# Patient Record
Sex: MALE | Race: WHITE | NOT HISPANIC OR LATINO | Employment: OTHER | URBAN - METROPOLITAN AREA
[De-identification: names, ages, dates, MRNs, and addresses within clinical notes are randomized per-mention and may not be internally consistent; named-entity substitution may affect disease eponyms.]

---

## 2017-01-12 DIAGNOSIS — Z87.442 HISTORY OF KIDNEY STONES: Primary | ICD-10-CM

## 2017-01-12 DIAGNOSIS — N52.9 ED (ERECTILE DYSFUNCTION): ICD-10-CM

## 2017-01-12 DIAGNOSIS — R31.29 MICROHEMATURIA: ICD-10-CM

## 2017-01-13 ENCOUNTER — HOSPITAL ENCOUNTER (OUTPATIENT)
Dept: GENERAL RADIOLOGY | Facility: CLINIC | Age: 67
Discharge: HOME | End: 2017-01-13
Attending: UROLOGY | Admitting: UROLOGY
Payer: MEDICARE

## 2017-01-13 ENCOUNTER — OFFICE VISIT (OUTPATIENT)
Dept: FAMILY MEDICINE | Facility: CLINIC | Age: 67
End: 2017-01-13
Payer: COMMERCIAL

## 2017-01-13 ENCOUNTER — OFFICE VISIT (OUTPATIENT)
Dept: UROLOGY | Facility: CLINIC | Age: 67
End: 2017-01-13
Payer: COMMERCIAL

## 2017-01-13 VITALS
OXYGEN SATURATION: 96 % | HEIGHT: 69 IN | WEIGHT: 270 LBS | HEART RATE: 57 BPM | SYSTOLIC BLOOD PRESSURE: 111 MMHG | DIASTOLIC BLOOD PRESSURE: 58 MMHG | BODY MASS INDEX: 39.99 KG/M2 | TEMPERATURE: 97.9 F

## 2017-01-13 VITALS
BODY MASS INDEX: 39.99 KG/M2 | HEIGHT: 69 IN | SYSTOLIC BLOOD PRESSURE: 120 MMHG | WEIGHT: 270 LBS | DIASTOLIC BLOOD PRESSURE: 62 MMHG

## 2017-01-13 DIAGNOSIS — N40.1 BENIGN PROSTATIC HYPERPLASIA WITH LOWER URINARY TRACT SYMPTOMS, UNSPECIFIED MORPHOLOGY: Primary | ICD-10-CM

## 2017-01-13 DIAGNOSIS — N52.01 ERECTILE DYSFUNCTION DUE TO ARTERIAL INSUFFICIENCY: ICD-10-CM

## 2017-01-13 DIAGNOSIS — I48.19 PERSISTENT ATRIAL FIBRILLATION (H): ICD-10-CM

## 2017-01-13 DIAGNOSIS — E11.9 TYPE 2 DIABETES MELLITUS WITHOUT COMPLICATION, WITH LONG-TERM CURRENT USE OF INSULIN (H): ICD-10-CM

## 2017-01-13 DIAGNOSIS — E78.5 HYPERLIPIDEMIA LDL GOAL <100: ICD-10-CM

## 2017-01-13 DIAGNOSIS — R31.29 MICROHEMATURIA: ICD-10-CM

## 2017-01-13 DIAGNOSIS — I25.10 CORONARY ARTERY CALCIFICATION: Primary | ICD-10-CM

## 2017-01-13 DIAGNOSIS — N20.0 KIDNEY STONES: ICD-10-CM

## 2017-01-13 DIAGNOSIS — Z87.442 HISTORY OF KIDNEY STONES: ICD-10-CM

## 2017-01-13 DIAGNOSIS — Z79.4 TYPE 2 DIABETES MELLITUS WITHOUT COMPLICATION, WITH LONG-TERM CURRENT USE OF INSULIN (H): ICD-10-CM

## 2017-01-13 LAB
BASOPHILS # BLD AUTO: 0 10E9/L (ref 0–0.2)
BASOPHILS NFR BLD AUTO: 0.4 %
DIFFERENTIAL METHOD BLD: ABNORMAL
EOSINOPHIL # BLD AUTO: 0.2 10E9/L (ref 0–0.7)
EOSINOPHIL NFR BLD AUTO: 2.4 %
ERYTHROCYTE [DISTWIDTH] IN BLOOD BY AUTOMATED COUNT: 13.8 % (ref 10–15)
HCT VFR BLD AUTO: 44.2 % (ref 40–53)
HGB BLD-MCNC: 13.7 G/DL (ref 13.3–17.7)
LYMPHOCYTES # BLD AUTO: 1.2 10E9/L (ref 0.8–5.3)
LYMPHOCYTES NFR BLD AUTO: 14.8 %
MCH RBC QN AUTO: 29.3 PG (ref 26.5–33)
MCHC RBC AUTO-ENTMCNC: 31 G/DL (ref 31.5–36.5)
MCV RBC AUTO: 95 FL (ref 78–100)
MONOCYTES # BLD AUTO: 0.6 10E9/L (ref 0–1.3)
MONOCYTES NFR BLD AUTO: 7.3 %
NEUTROPHILS # BLD AUTO: 6 10E9/L (ref 1.6–8.3)
NEUTROPHILS NFR BLD AUTO: 75.1 %
PLATELET # BLD AUTO: 160 10E9/L (ref 150–450)
PSA SERPL-MCNC: 0.48 NG/ML (ref 0–4)
RBC # BLD AUTO: 4.67 10E12/L (ref 4.4–5.9)
WBC # BLD AUTO: 8 10E9/L (ref 4–11)

## 2017-01-13 PROCEDURE — 99213 OFFICE O/P EST LOW 20 MIN: CPT | Performed by: UROLOGY

## 2017-01-13 PROCEDURE — 85025 COMPLETE CBC W/AUTO DIFF WBC: CPT | Performed by: PREVENTIVE MEDICINE

## 2017-01-13 PROCEDURE — 74010 XR KUB: CPT | Mod: 52

## 2017-01-13 PROCEDURE — 36415 COLL VENOUS BLD VENIPUNCTURE: CPT | Performed by: PREVENTIVE MEDICINE

## 2017-01-13 PROCEDURE — 81003 URINALYSIS AUTO W/O SCOPE: CPT | Performed by: UROLOGY

## 2017-01-13 PROCEDURE — 84153 ASSAY OF PSA TOTAL: CPT | Performed by: UROLOGY

## 2017-01-13 PROCEDURE — 80061 LIPID PANEL: CPT | Performed by: PREVENTIVE MEDICINE

## 2017-01-13 PROCEDURE — 99214 OFFICE O/P EST MOD 30 MIN: CPT | Performed by: PREVENTIVE MEDICINE

## 2017-01-13 PROCEDURE — 80076 HEPATIC FUNCTION PANEL: CPT | Performed by: PREVENTIVE MEDICINE

## 2017-01-13 RX ORDER — GLIPIZIDE 10 MG/1
10 TABLET, FILM COATED, EXTENDED RELEASE ORAL DAILY
Qty: 90 TABLET | Refills: 3 | Status: SHIPPED | OUTPATIENT
Start: 2017-01-13 | End: 2019-06-03

## 2017-01-13 RX ORDER — ROSUVASTATIN CALCIUM 20 MG/1
20 TABLET, COATED ORAL DAILY
Qty: 90 TABLET | Refills: 3 | Status: SHIPPED | OUTPATIENT
Start: 2017-01-13 | End: 2017-07-13

## 2017-01-13 ASSESSMENT — PAIN SCALES - GENERAL: PAINLEVEL: MILD PAIN (2)

## 2017-01-13 NOTE — NURSING NOTE
"Chief Complaint   Patient presents with     RECHECK       Initial /58 mmHg  Pulse 57  Temp(Src) 97.9  F (36.6  C) (Oral)  Ht 5' 8.5\" (1.74 m)  Wt 270 lb (122.471 kg)  BMI 40.45 kg/m2  SpO2 96% Estimated body mass index is 40.45 kg/(m^2) as calculated from the following:    Height as of this encounter: 5' 8.5\" (1.74 m).    Weight as of this encounter: 270 lb (122.471 kg).  BP completed using cuff size: large left arm  Yasmin Naples- CMA      "

## 2017-01-13 NOTE — Clinical Note
1/13/2017       RE: Te Yoder  [address]  [facility] [address]     Dear Colleague,    Thank you for referring your patient, Te Yoder, to the [facility] UROLOGY [facility] [place] at [facility]. Please see a copy of my visit note below.    Te Yoder is a 66-year-old male with BPH,, history of calcium urolithiasis and erectile dysfunction.  His PSA is done yearly at his request,  There is no family history of prostate cancer.  He is voiding comfortably, no dysuria or hematuria.  Urinalysis shows glucosuria  Recent hemoglobin A1c was 7.4%  Other past medical history:2 diabetes-insulin-dependent, morbid obesity, hypertension, hyperlipidemia,optic atrophy left eye, stage II renal disease, phrenic neuropathy, anemia, B12 deficiency, sleep apnea, atrial fibrillation,, nonsmoker  Family history: Heart disease  Medications:in total,, vitamin D, vitamin B12, Flonase, glipizide, hydrochlorothiazide, insulin, lisinopril, Glucophage, Toprol-XL, Crestor, Xarelto  Allergies: Simvastatin  Exam: Normal appearance, normal vital signs, alert and oriented, normocephalic, normal respirations, neuro grossly intact. Morbid abdominal obesity.  Normal sphincter tone, no rectal mass or impaction, benign prostate and normal seminal vesicles  Urinalysis: Glucose, ssmall protein, specific gravity 1.015, pH 7.0  PSA: 0.48  Assessment: Erectile dysfunction-continue using Trimix.  He is not interested in prosthesis                         BPH-follow symptoms                         History of calcium urolithiasis-no new stones on KUB                           PSA normal-discussed national guidelines for PSA and prostate cancer screening  Plan: See me yearly with KUB, for urinalysis and digital rectal exam..  Patient would like PSA yearly                                 Again, thank you for allowing me to participate in the care of your patient.      Sincerely,    Marques METCALF  MD [place]

## 2017-01-13 NOTE — MR AVS SNAPSHOT
After Visit Summary   1/13/2017    Te Yoder    MRN: [id number]           Patient Information     Date Of Birth          1950        Visit Information        Provider Department      1/13/2017 1:00 PM Paolo Brown MD [facility]        Today's Diagnoses     Coronary artery calcification    -  1     Type 2 diabetes mellitus with diabetic nephropathy (H)            Follow-ups after your visit        Your next 10 appointments already scheduled     Jan 30, 2017  8:00 AM   New Sleep Patient with Alexandr Atkinson MD   [facility] ([facility])    [address] [place] [id number]   [phone]              Future tests that were ordered for you today     Open Future Orders        Priority Expected Expires Ordered    XR KUB [[id number]] Routine 1/13/2018 1/13/2018 1/13/2017    PSA Diag Urologic Phys Routine 1/13/2018 1/13/2018 1/13/2017            Who to contact     If you have questions or need follow up information about today's clinic visit or your schedule please contact [facility] directly at [phone].  Normal or non-critical lab and imaging results will be communicated to you by MyChart, letter or phone within 4 business days after the clinic has received the results. If you do not hear from us within 7 days, please contact the clinic through [employer] or phone. If you have a critical or abnormal lab result, we will notify you by phone as soon as possible.  Submit refill requests through [employer] or call your pharmacy and they will forward the refill request to us. Please allow 3 business days for your refill to be completed.          Additional Information About Your Visit        MyChart Information     [employer] gives you secure access to your electronic health record. If you see a primary care provider, you can also send messages to your care team and make appointments. If you have  "questions, please call your primary care clinic.  If you do not have a primary care provider, please call [phone] and they will assist you.        Care EveryWhere ID     This is your Care EveryWhere ID. This could be used by other organizations to access your [facility] medical records  [id number]        Your Vitals Were     Pulse Temperature Height BMI (Body Mass Index) Pulse Oximetry       57 97.9  F (36.6  C) (Oral) 5' 8.5\" (1.74 m) 40.45 kg/m2 96%        Blood Pressure from Last 3 Encounters:   01/13/17 111/58   01/13/17 120/62   10/24/16 116/61    Weight from Last 3 Encounters:   01/13/17 270 lb (122.471 kg)   01/13/17 270 lb (122.471 kg)   10/24/16 260 lb (117.935 kg)              Today, you had the following     No orders found for display       Primary Care Provider Office Phone # Fax #    Paolo Geraldo Brown [phone]       [facility] [facility]  [facility] [address]        Thank you!     Thank you for choosing [facility]  for your care. Our goal is always to provide you with excellent care. Hearing back from our patients is one way we can continue to improve our services. Please take a few minutes to complete the written survey that you may receive in the mail after your visit with us. Thank you!             Your Updated Medication List - Protect others around you: Learn how to safely use, store and throw away your medicines at www.disposemymeds.org.          This list is accurate as of: 1/13/17  1:15 PM.  Always use your most recent med list.                   Brand Name Dispense Instructions for use    ACCU-CHEK CLAUDIO      Test 3-4 times daily as directed       ASPIRIN NOT PRESCRIBED    INTENTIONAL     Antiplatelet medication not prescribed intentionally due to Current anticoagulant therapy (warfarin/enoxaparin)       canaliflozin tablet    INVOKANA     Take 100 mg by mouth every morning (before breakfast)       cyanocobalamin 1000 " "MCG tablet    vitamin  B-12     Take 2 tablets by mouth daily       fluticasone 50 MCG/ACT spray    FLONASE    15 g    Spray 2 sprays into both nostrils daily       glipiZIDE 10 MG 24 hr tablet    glipiZIDE XL    90 tablet    Take 1 tablet (10 mg) by mouth daily       hydrochlorothiazide 12.5 MG Tabs tablet     90 tablet    Take 1 tablet (12.5 mg) by mouth daily       insulin aspart 100 UNITS/ML injection    NovoLOG FLEXpen    15 mL    Inject under the skin 3 times daily before meals as directed by sliding scale.       insulin pen needle 31G X 8 MM    B-D U/F    1 each    Use 3-5 times daily as directed.       insulin syringe-needle U-100 30G X 1/2\" 0.5 ML    BD insulin syringe ULTRAFINE    100 each    Use daily as directed.       LANTUS VIAL 100 UNIT/ML injection   Generic drug:  insulin glargine      Inject 50 Units Subcutaneous 2 times daily.       lisinopril 20 MG tablet    PRINIVIL/ZESTRIL    180 tablet    Take 1 tablet (20 mg) by mouth 2 times daily       metFORMIN 1000 MG tablet    GLUCOPHAGE    180 tablet    TAKE 1 TABLET (1,000 MG) BY MOUTH 2 TIMES DAILY (WITH MEALS)       metoprolol 50 MG 24 hr tablet    TOPROL-XL    90 tablet    Take 1 tablet (50 mg) by mouth daily       order for DME     1 Device    Equipment being ordered: diabetic shoes       rosuvastatin 20 MG tablet    CRESTOR    90 tablet    take one tablet by mouth once daily       Vitamin D3 5000 UNITS Chew      Take 1-2 capsules by mouth daily       XARELTO 20 MG Tabs tablet   Generic drug:  rivaroxaban ANTICOAGULANT     90 tablet    TAKE 1 TABLET (20 MG) BY MOUTH DAILY (WITH DINNER)         "

## 2017-01-13 NOTE — NURSING NOTE
Chief Complaint   Patient presents with     Annual Prostate Exam     Patient is here for annual prostate exam     Psa Screening     Patient is here for PSA      Results     KUB results     Lrary Bhandari LPN 10:04 AM January 13, 2017

## 2017-01-13 NOTE — PROGRESS NOTES
Te Yoder is a 66-year-old male with BPH,, history of calcium urolithiasis and erectile dysfunction.  His PSA is done yearly at his request,  There is no family history of prostate cancer.  He is voiding comfortably, no dysuria or hematuria.  Urinalysis shows glucosuria  Recent hemoglobin A1c was 7.4%  Other past medical history:2 diabetes-insulin-dependent, morbid obesity, hypertension, hyperlipidemia,optic atrophy left eye, stage II renal disease, phrenic neuropathy, anemia, B12 deficiency, sleep apnea, atrial fibrillation,, nonsmoker  Family history: Heart disease  Medications:in total,, vitamin D, vitamin B12, Flonase, glipizide, hydrochlorothiazide, insulin, lisinopril, Glucophage, Toprol-XL, Crestor, Xarelto  Allergies: Simvastatin  Exam: Normal appearance, normal vital signs, alert and oriented, normocephalic, normal respirations, neuro grossly intact. Morbid abdominal obesity.  Normal sphincter tone, no rectal mass or impaction, benign prostate and normal seminal vesicles  Urinalysis: Glucose, ssmall protein, specific gravity 1.015, pH 7.0  PSA: 0.48  Assessment: Erectile dysfunction-continue using Trimix.  He is not interested in prosthesis                         BPH-follow symptoms                         History of calcium urolithiasis-no new stones on KUB                           PSA normal-discussed national guidelines for PSA and prostate cancer screening  Plan: See me yearly with KUB, for urinalysis and digital rectal exam..  Patient would like PSA yearly

## 2017-01-14 LAB
ALBUMIN SERPL-MCNC: 3.8 G/DL (ref 3.4–5)
ALP SERPL-CCNC: 70 U/L (ref 40–150)
ALT SERPL W P-5'-P-CCNC: 29 U/L (ref 0–70)
AST SERPL W P-5'-P-CCNC: 22 U/L (ref 0–45)
BILIRUB DIRECT SERPL-MCNC: 0.2 MG/DL (ref 0–0.2)
BILIRUB SERPL-MCNC: 0.6 MG/DL (ref 0.2–1.3)
CHOLEST SERPL-MCNC: 130 MG/DL
HDLC SERPL-MCNC: 31 MG/DL
LDLC SERPL CALC-MCNC: 75 MG/DL
NONHDLC SERPL-MCNC: 99 MG/DL
PROT SERPL-MCNC: 7.2 G/DL (ref 6.8–8.8)
TRIGL SERPL-MCNC: 121 MG/DL

## 2017-01-16 LAB
ALBUMIN UR-MCNC: 30 MG/DL
APPEARANCE UR: CLEAR
BILIRUB UR QL STRIP: NEGATIVE
COLOR UR AUTO: YELLOW
GLUCOSE UR STRIP-MCNC: 500 MG/DL
HGB UR QL STRIP: ABNORMAL
KETONES UR STRIP-MCNC: NEGATIVE MG/DL
LEUKOCYTE ESTERASE UR QL STRIP: NEGATIVE
NITRATE UR QL: NEGATIVE
PH UR STRIP: 5.5 PH (ref 5–7)
SP GR UR STRIP: 1.01 (ref 1–1.03)
URN SPEC COLLECT METH UR: ABNORMAL
UROBILINOGEN UR STRIP-ACNC: 0.2 EU/DL (ref 0.2–1)

## 2017-01-16 NOTE — PROGRESS NOTES
"SUBJECTIVE:  Te Yoder, a 66 year old male scheduled an appointment to discuss the following issues:     Coronary artery calcification  Hyperlipidemia LDL goal <100  Type 2 diabetes mellitus without complication, with long-term current use of insulin (H)  Persistent atrial fibrillation (H)  Here for routine follow up feels well    Medical, social, surgical, and family histories reviewed.    ROS:  C: NEGATIVE for fever, chills  E: NEGATIVE for vision changes   R: NEGATIVE for significant cough or SOB  CV: NEGATIVE for chest pain, palpitations   GI: NEGATIVE for nausea, abdominal pain, heartburn, or change in bowel habits  : NEGATIVE for frequency, dysuria, or hematuria  M: NEGATIVE for significant arthralgias or myalgia  N: NEGATIVE for weakness, dizziness or paresthesias or headache    OBJECTIVE:  /58 mmHg  Pulse 57  Temp(Src) 97.9  F (36.6  C) (Oral)  Ht 5' 8.5\" (1.74 m)  Wt 270 lb (122.471 kg)  BMI 40.45 kg/m2  SpO2 96%  EXAM:  GENERAL APPEARANCE: healthy, alert and no distress  EYES: EOMI,  PERRL  HENT: ear canals and TM's normal and nose and mouth without ulcers or lesions  RESP: lungs clear to auscultation - no rales, rhonchi or wheezes  CV: regular rates and rhythm, normal S1 S2, no S3 or S4 and no murmur, click or rub -  ABDOMEN:  soft, nontender, no HSM or masses and bowel sounds normal    ASSESSMENT/PLAN:  (I25.10,  I25.84) Coronary artery calcification  (primary encounter diagnosis)  Plan: rosuvastatin (CRESTOR) 20 MG tablet    (E78.5) Hyperlipidemia LDL goal <100  Plan: Hepatic panel (Albumin, ALT, AST, Bili, Alk         Phos, TP), Lipid panel reflex to direct LDL    (E11.9,  Z79.4) Type 2 diabetes mellitus without complication, with long-term current use of insulin (H)  Plan: glipiZIDE (GLIPIZIDE XL) 10 MG 24 hr tablet,         metFORMIN (GLUCOPHAGE) 1000 MG tablet, CBC with        platelets and differential    (I48.1) Persistent atrial fibrillation (H)    Above issues are stable, med " changes pending labs    25 minutes spent with patient, over 50% time counseling, coordinating care and explaining about nature of the patient's conditions.    All risks, benefits of treatment and further evaluation was reviewed with patient.  Pt expressed understanding.  Pt was in agreement with this plan.  Paolo Brown MD

## 2017-01-17 ENCOUNTER — TRANSFERRED RECORDS (OUTPATIENT)
Dept: HEALTH INFORMATION MANAGEMENT | Facility: CLINIC | Age: 67
End: 2017-01-17

## 2017-01-30 ENCOUNTER — OFFICE VISIT (OUTPATIENT)
Dept: SLEEP MEDICINE | Facility: CLINIC | Age: 67
End: 2017-01-30
Payer: COMMERCIAL

## 2017-01-30 VITALS
DIASTOLIC BLOOD PRESSURE: 70 MMHG | HEIGHT: 69 IN | HEART RATE: 60 BPM | BODY MASS INDEX: 39.84 KG/M2 | WEIGHT: 269 LBS | TEMPERATURE: 98.1 F | SYSTOLIC BLOOD PRESSURE: 138 MMHG | OXYGEN SATURATION: 96 %

## 2017-01-30 DIAGNOSIS — G47.33 OSA (OBSTRUCTIVE SLEEP APNEA): Primary | ICD-10-CM

## 2017-01-30 PROCEDURE — 99213 OFFICE O/P EST LOW 20 MIN: CPT | Performed by: PSYCHIATRY & NEUROLOGY

## 2017-01-30 NOTE — NURSING NOTE
"Chief Complaint   Patient presents with     Sleep Problem     sleep apnea       Initial /70 mmHg  Temp(Src) 98.1  F (36.7  C) (Oral)  Resp 60  Ht 1.753 m (5' 9\")  Wt 122.018 kg (269 lb)  BMI 39.71 kg/m2  SpO2 96% Estimated body mass index is 39.71 kg/(m^2) as calculated from the following:    Height as of this encounter: 1.753 m (5' 9\").    Weight as of this encounter: 122.018 kg (269 lb).  BP completed using cuff size: large right arm  Beba Freeman MA  [facility]      "

## 2017-01-30 NOTE — PROGRESS NOTES
SLEEP CENTER FOLLOW UP      Mr. Te Yoder is 66 years old.  I saw him a few years ago at which point we had a long conversation in regard to his short sleep duration.  In particular, I felt that he was probably sleeping more than he realized since he often takes a nap for a couple hours in the evening and then he will go to bed at about midnight and wake up between 5:30 and 6:00 in the morning, overall getting pretty close to 7 or greater hours of sleep in 24 hours.      He does not have a concern in regard to that at this point.  He states he is still quite alert and refreshed during the day, does not have any troubles falling asleep inappropriately.  His main issue at this point is just getting a new machine and he brings in a CPAP machine which looks close to 20 years old today.  He states that he has an older auto-titrating unit that he has not been using but that unit is much greater than 5 years old according to his own recollection.  He is interested in getting a new unit.  He has several questions in regard to mask interfaces and different options for him.  I have put in a referral for him to visit with our colleagues in durable medical equipment supplies at [facility] and will enroll him in our sleep therapy monitoring program.  Last time I saw him, I wanted him to return with his new auto-titrating unit with his download card so we can get a good idea of how the pressures are working for him.  He did not follow up, I would like him to do that.  At this point most of which however, can be taken care of through our sleep therapy monitoring team.  I explained to him that there is a modem on these units and we can adjust and monitor his sleep remotely.  The patient understands the plan.  It is a great privilege being asked to participate in his care.  All questions have been answered.      Fifteen minutes spent with the patient today, greater than 50% of the time in counseling and coordination of  care.         KATTY VELA MD             D: 2017 08:19   T: 2017 09:27   MT: andrea      Name:     MEG WOODRUFF   MRN:      [id number]        Account:      [id number]   :      1950           Visit Date:   2017      Document: [id number]

## 2017-01-30 NOTE — PATIENT INSTRUCTIONS

## 2017-02-01 ENCOUNTER — DOCUMENTATION ONLY (OUTPATIENT)
Dept: SLEEP MEDICINE | Facility: CLINIC | Age: 67
End: 2017-02-01

## 2017-03-06 ENCOUNTER — OFFICE VISIT (OUTPATIENT)
Dept: FAMILY MEDICINE | Facility: CLINIC | Age: 67
End: 2017-03-06
Payer: COMMERCIAL

## 2017-03-06 VITALS
BODY MASS INDEX: 40.11 KG/M2 | DIASTOLIC BLOOD PRESSURE: 59 MMHG | OXYGEN SATURATION: 95 % | SYSTOLIC BLOOD PRESSURE: 126 MMHG | HEIGHT: 69 IN | TEMPERATURE: 97.6 F | WEIGHT: 270.8 LBS | HEART RATE: 63 BPM

## 2017-03-06 DIAGNOSIS — Z12.11 COLON CANCER SCREENING: ICD-10-CM

## 2017-03-06 DIAGNOSIS — G62.9 NEUROPATHY: ICD-10-CM

## 2017-03-06 DIAGNOSIS — I73.9 CLAUDICATION (H): Primary | ICD-10-CM

## 2017-03-06 PROCEDURE — 99214 OFFICE O/P EST MOD 30 MIN: CPT | Mod: 25 | Performed by: PREVENTIVE MEDICINE

## 2017-03-06 PROCEDURE — G0009 ADMIN PNEUMOCOCCAL VACCINE: HCPCS | Performed by: PREVENTIVE MEDICINE

## 2017-03-06 PROCEDURE — 90732 PPSV23 VACC 2 YRS+ SUBQ/IM: CPT | Performed by: PREVENTIVE MEDICINE

## 2017-03-06 NOTE — PROGRESS NOTES
"SUBJECTIVE:  Te Yoder, a 66 year old male scheduled an appointment to discuss the following issues:     Claudication (H)  Colon cancer screening  Neuropathy (H)  Pt with foot pain, numbness bothersome at night  a1c 7.4    Medical, social, surgical, and family histories reviewed.    ROS:  C: NEGATIVE for fever, chills  E: NEGATIVE for vision changes   R: NEGATIVE for significant cough or SOB  CV: NEGATIVE for chest pain, palpitations   GI: NEGATIVE for nausea, abdominal pain, heartburn, or change in bowel habits  : NEGATIVE for frequency, dysuria, or hematuria  M: NEGATIVE for significant arthralgias or myalgia  N: NEGATIVE for weakness, dizziness or paresthesias or headache    OBJECTIVE:  /59 (BP Location: Right arm, Patient Position: Chair, Cuff Size: Adult Large)  Pulse 63  Temp 97.6  F (36.4  C) (Oral)  Ht 5' 9\" (1.753 m)  Wt 270 lb 12.8 oz (122.8 kg)  SpO2 95%  BMI 39.99 kg/m2  EXAM:  GENERAL APPEARANCE: healthy, alert and no distress  EYES: EOMI,  PERRL  HENT: ear canals and TM's normal and nose and mouth without ulcers or lesions  RESP: lungs clear to auscultation - no rales, rhonchi or wheezes  CV: regular rates and rhythm, normal S1 S2, no S3 or S4 and no murmur, click or rub -  ABDOMEN:  soft, nontender, no HSM or masses and bowel sounds normal  FEET - normal dp and pt pulses, decreased light touch sensation in feet bilaterally, no sores noted    ASSESSMENT/PLAN:  (I73.9) Claudication (H)  (primary encounter diagnosis)  Plan: US JAVIER Doppler with Exercise, PNEUMOVOCCAL         VACCINE 23 VALENT (PNEUMOVAX 23)  Unclear if there is a component of pad in his leg pain  Will do javier    (Z12.11) Colon cancer screening  Plan: Fecal colorectal cancer screen (FIT)    (G62.9) Neuropathy (H)  Advise optimizing glucoses, limit alcohol, check feet daily  Exercise regularly  Emg/ncs with evidence of neuropathy in 2013    25 minutes spent with patient, over 50% time counseling, coordinating care and explaining " about nature of the patient's conditions.    All risks, benefits of treatment and further evaluation was reviewed with patient.  Pt expressed understanding.  Pt was in agreement with this plan.    Paolo Brown MD

## 2017-03-06 NOTE — MR AVS SNAPSHOT
After Visit Summary   3/6/2017    Te Yoder    MRN: [id number]           Patient Information     Date Of Birth          1950        Visit Information        Provider Department      3/6/2017 8:30 AM Paolo Brown MD [facility]         Follow-ups after your visit        Your next 10 appointments already scheduled     Mar 08, 2017 10:30 AM CST   SHORT with Tosin [facility] ([facility])    [address]  [facility] [address] [phone]            Mar 22, 2017  9:30 AM CDT   Return Sleep Patient with Alexandr Atkinson MD   [facility] ([facility])    [address]  [facility] [id number] [phone]            May 17, 2017  4:00 PM CDT   Office Visit with Paolo Brown MD   [facility]    [address] [id number] [phone]           Bring a current list of meds and any records pertaining to this visit.  For Physicals, please bring immunization records and any forms needing to be filled out.  Please arrive 10 minutes early to complete paperwork.              Who to contact     If you have questions or need follow up information about today's clinic visit or your schedule please contact [facility] directly at [phone].  Normal or non-critical lab and imaging results will be communicated to you by MyChart, letter or phone within 4 business days after the clinic has received the results. If you do not hear from us within 7 days, please contact the clinic through MyChart or phone. If you have a critical or abnormal lab result, we will notify you by phone as soon as possible.  Submit refill requests through [employer] or call your pharmacy and they will forward the refill request to us. Please allow 3 business days for your refill to be completed.  "         Additional Information About Your Visit        MyChart Information     [employer] (Closed) gives you secure access to your electronic health record. If you see a primary care provider, you can also send messages to your care team and make appointments. If you have questions, please call your primary care clinic.  If you do not have a primary care provider, please call [phone] and they will assist you.        Care EveryWhere ID     This is your Care EveryWhere ID. This could be used by other organizations to access your [facility] medical records  [id number]        Your Vitals Were     Pulse Temperature Height Pulse Oximetry BMI (Body Mass Index)       63 97.6  F (36.4  C) (Oral) 5' 9\" (1.753 m) 95% 39.99 kg/m2        Blood Pressure from Last 3 Encounters:   03/06/17 126/59   01/30/17 138/70   01/13/17 111/58    Weight from Last 3 Encounters:   03/06/17 270 lb 12.8 oz (122.8 kg)   01/30/17 269 lb (122 kg)   01/13/17 270 lb (122.5 kg)              Today, you had the following     No orders found for display       Primary Care Provider Office Phone # Fax #    Paolo Geraldo Brown [phone]       [facility] [address] [address]        Thank you!     Thank you for choosing [facility]  for your care. Our goal is always to provide you with excellent care. Hearing back from our patients is one way we can continue to improve our services. Please take a few minutes to complete the written survey that you may receive in the mail after your visit with us. Thank you!             Your Updated Medication List - Protect others around you: Learn how to safely use, store and throw away your medicines at www.disposemymeds.org.          This list is accurate as of: 3/6/17  8:39 AM.  Always use your most recent med list.                   Brand Name Dispense Instructions for use    ACCU-CHEK CLAUDIO      Test 3-4 times daily as directed       ASPIRIN NOT " "PRESCRIBED    INTENTIONAL     Antiplatelet medication not prescribed intentionally due to Current anticoagulant therapy (warfarin/enoxaparin)       canaliflozin tablet    INVOKANA     Take 100 mg by mouth every morning (before breakfast)       cyanocobalamin 1000 MCG tablet    vitamin  B-12     Take 2 tablets by mouth daily       fluticasone 50 MCG/ACT spray    FLONASE    15 g    Spray 2 sprays into both nostrils daily       glipiZIDE 10 MG 24 hr tablet    glipiZIDE XL    90 tablet    Take 1 tablet (10 mg) by mouth daily       hydrochlorothiazide 12.5 MG Tabs tablet     90 tablet    Take 1 tablet (12.5 mg) by mouth daily       insulin aspart 100 UNITS/ML injection    NovoLOG FLEXpen    15 mL    Inject under the skin 3 times daily before meals as directed by sliding scale.       insulin pen needle 31G X 8 MM    B-D U/F    1 each    Use 3-5 times daily as directed.       insulin syringe-needle U-100 30G X 1/2\" 0.5 ML    BD insulin syringe ULTRAFINE    100 each    Use daily as directed.       LANTUS VIAL 100 UNIT/ML injection   Generic drug:  insulin glargine      Inject 50 Units Subcutaneous 2 times daily.       lisinopril 20 MG tablet    PRINIVIL/ZESTRIL    180 tablet    Take 1 tablet (20 mg) by mouth 2 times daily       metFORMIN 1000 MG tablet    GLUCOPHAGE    180 tablet    Take 1 tablet (1,000 mg) by mouth 2 times daily (with meals)       metoprolol 50 MG 24 hr tablet    TOPROL-XL    90 tablet    Take 1 tablet (50 mg) by mouth daily       order for DME      DREAMSTATION 5-15CM/H20 NASAL COMFORT BLUE       order for DME     1 Device    Equipment being ordered: diabetic shoes       rosuvastatin 20 MG tablet    CRESTOR    90 tablet    Take 1 tablet (20 mg) by mouth daily       Vitamin D3 5000 UNITS Chew      Take 1-2 capsules by mouth daily       XARELTO 20 MG Tabs tablet   Generic drug:  rivaroxaban ANTICOAGULANT     90 tablet    TAKE 1 TABLET (20 MG) BY MOUTH DAILY (WITH DINNER)         "

## 2017-03-06 NOTE — NURSING NOTE
"Chief Complaint   Patient presents with     RECHECK       Initial /59 (BP Location: Right arm, Patient Position: Chair, Cuff Size: Adult Large)  Pulse 63  Temp 97.6  F (36.4  C) (Oral)  Ht 5' 9\" (1.753 m)  Wt 270 lb 12.8 oz (122.8 kg)  SpO2 95%  BMI 39.99 kg/m2 Estimated body mass index is 39.99 kg/(m^2) as calculated from the following:    Height as of this encounter: 5' 9\" (1.753 m).    Weight as of this encounter: 270 lb 12.8 oz (122.8 kg).  Medication Reconciliation: complete.  Nadine Delong CMA    "

## 2017-03-08 ENCOUNTER — OFFICE VISIT (OUTPATIENT)
Dept: PHARMACY | Facility: CLINIC | Age: 67
End: 2017-03-08
Payer: COMMERCIAL

## 2017-03-08 ENCOUNTER — HOSPITAL ENCOUNTER (OUTPATIENT)
Dept: ULTRASOUND IMAGING | Facility: CLINIC | Age: 67
Discharge: HOME | End: 2017-03-08
Attending: PREVENTIVE MEDICINE | Admitting: PREVENTIVE MEDICINE
Payer: MEDICARE

## 2017-03-08 VITALS — WEIGHT: 273 LBS | BODY MASS INDEX: 40.32 KG/M2 | SYSTOLIC BLOOD PRESSURE: 132 MMHG | DIASTOLIC BLOOD PRESSURE: 68 MMHG

## 2017-03-08 DIAGNOSIS — G62.9 PERIPHERAL POLYNEUROPATHY: ICD-10-CM

## 2017-03-08 DIAGNOSIS — E63.9 NUTRITIONAL DISORDER: ICD-10-CM

## 2017-03-08 DIAGNOSIS — Z79.4 DIABETES MELLITUS DUE TO UNDERLYING CONDITION WITH CHRONIC KIDNEY DISEASE, WITH LONG-TERM CURRENT USE OF INSULIN, UNSPECIFIED CKD STAGE (H): Primary | ICD-10-CM

## 2017-03-08 DIAGNOSIS — I48.91 ATRIAL FIBRILLATION, UNSPECIFIED TYPE (H): ICD-10-CM

## 2017-03-08 DIAGNOSIS — I10 ESSENTIAL HYPERTENSION WITH GOAL BLOOD PRESSURE LESS THAN 140/90: ICD-10-CM

## 2017-03-08 DIAGNOSIS — Z79.4 DIABETES MELLITUS DUE TO UNDERLYING CONDITION WITH CHRONIC KIDNEY DISEASE, WITH LONG-TERM CURRENT USE OF INSULIN, UNSPECIFIED CKD STAGE (H): ICD-10-CM

## 2017-03-08 DIAGNOSIS — E78.5 HYPERLIPIDEMIA LDL GOAL <100: ICD-10-CM

## 2017-03-08 DIAGNOSIS — I73.9 CLAUDICATION (H): ICD-10-CM

## 2017-03-08 DIAGNOSIS — J30.2 SEASONAL ALLERGIC RHINITIS, UNSPECIFIED ALLERGIC RHINITIS TRIGGER: ICD-10-CM

## 2017-03-08 DIAGNOSIS — E08.22 DIABETES MELLITUS DUE TO UNDERLYING CONDITION WITH CHRONIC KIDNEY DISEASE, WITH LONG-TERM CURRENT USE OF INSULIN, UNSPECIFIED CKD STAGE (H): Primary | ICD-10-CM

## 2017-03-08 DIAGNOSIS — E08.22 DIABETES MELLITUS DUE TO UNDERLYING CONDITION WITH CHRONIC KIDNEY DISEASE, WITH LONG-TERM CURRENT USE OF INSULIN, UNSPECIFIED CKD STAGE (H): ICD-10-CM

## 2017-03-08 LAB — HBA1C MFR BLD: 6.8 % (ref 4.3–6)

## 2017-03-08 PROCEDURE — 99607 MTMS BY PHARM ADDL 15 MIN: CPT | Performed by: PHARMACIST

## 2017-03-08 PROCEDURE — 99605 MTMS BY PHARM NP 15 MIN: CPT | Performed by: PHARMACIST

## 2017-03-08 PROCEDURE — 83036 HEMOGLOBIN GLYCOSYLATED A1C: CPT | Performed by: PREVENTIVE MEDICINE

## 2017-03-08 PROCEDURE — 93924 LWR XTR VASC STDY BILAT: CPT

## 2017-03-08 PROCEDURE — 36415 COLL VENOUS BLD VENIPUNCTURE: CPT | Performed by: PREVENTIVE MEDICINE

## 2017-03-08 NOTE — MR AVS SNAPSHOT
After Visit Summary   3/8/2017    Te Yoder    MRN: [id number]           Patient Information     Date Of Birth          1950        Visit Information        Provider Department      3/8/2017 10:30 AM Tosin Gillette, [facility] MTM        Care Instructions    Recommendations from today's MTM visit:                                                    MTM (medication therapy management) is a service provided by a clinical pharmacist designed to help you get the most of out of your medicines.   Today we reviewed what your medicines are for, how to know if they are working, that your medicines are safe and how to make your medicine regimen as easy as possible.     1. Please contact Tosin or myself with any questions you have about Lyrica (pregabalin) or gabapentin.     2. We will check A1c today.     Next MTM visit: 3 months    To schedule another MTM appointment, please call the clinic directly or you may call the MTM scheduling line at [phone] or toll-free at [phone].     My Clinical Pharmacist's contact information:                                                      It was a pleasure seeing you today!  Please feel free to contact me with any questions or concerns you have.      Chantel Quinteros, PharmD  [facility] [employer] Resident  [phone]      You may receive a survey about the MTM services you received.  I would appreciate your feedback to help me serve you better in the future. Please fill it out and return it when you can. Your comments will be anonymous.                Follow-ups after your visit        Your next 10 appointments already scheduled     Mar 08, 2017  2:30 PM CST   US JAVIER DOPPLER WITH EXERCISE with SHUS5   [facility] ([facility])    [address] [place] [address] [phone]           Please bring a list of your medicines (including vitamins, minerals and over-the-counter drugs). Also, tell  your doctor about any allergies you may have. Wear comfortable clothes and leave your valuables at home.  No caffeine or tobacco for 1 hour prior to exam.  Please call the Imaging Department at your exam site with any questions.            Mar 22, 2017  9:30 AM CDT   Return Sleep Patient with Alexandr Atkinson MD   [facility] ([facility])    [address] [id number] [phone]            May 17, 2017  4:00 PM CDT   Office Visit with Paolo Brown MD   [facility] ([facility])    [address] [facility] [address] [phone]           Bring a current list of meds and any records pertaining to this visit.  For Physicals, please bring immunization records and any forms needing to be filled out.  Please arrive 10 minutes early to complete paperwork.              Who to contact     If you have questions or need follow up information about today's clinic visit or your schedule please contact [facility] directly at [phone].  Normal or non-critical lab and imaging results will be communicated to you by FortaTrusthart, letter or phone within 4 business days after the clinic has received the results. If you do not hear from us within 7 days, please contact the clinic through Kids [employer] or phone. If you have a critical or abnormal lab result, we will notify you by phone as soon as possible.  Submit refill requests through [employer] or call your pharmacy and they will forward the refill request to us. Please allow 3 business days for your refill to be completed.          Additional Information About Your Visit        [employer] Information     [employer] gives you secure access to your electronic health record. If you see a primary care provider, you can also send messages to your care team and make appointments. If you have questions, please call your primary care clinic.  If you do not  have a primary care provider, please call [phone] and they will assist you.        Care EveryWhere ID     This is your Care EveryWhere ID. This could be used by other organizations to access your [place] medical records  [id number]         Blood Pressure from Last 3 Encounters:   03/06/17 126/59   01/30/17 138/70   01/13/17 111/58    Weight from Last 3 Encounters:   03/06/17 270 lb 12.8 oz (122.8 kg)   01/30/17 269 lb (122 kg)   01/13/17 270 lb (122.5 kg)              Today, you had the following     No orders found for display       Primary Care Provider Office Phone # Fax #    Paolo Geraldo Brown [phone]       [facility] [address] [facility] [address]  [facility] [address]        Thank you!     Thank you for choosing [facility]  for your care. Our goal is always to provide you with excellent care. Hearing back from our patients is one way we can continue to improve our services. Please take a few minutes to complete the written survey that you may receive in the mail after your visit with us. Thank you!             Your Updated Medication List - Protect others around you: Learn how to safely use, store and throw away your medicines at www.disposemymeds.org.          This list is accurate as of: 3/8/17 11:11 AM.  Always use your most recent med list.                   Brand Name Dispense Instructions for use    ACCU-CHEK CLAUDIO      Test 3-4 times daily as directed       ASPIRIN NOT PRESCRIBED    INTENTIONAL     Antiplatelet medication not prescribed intentionally due to Current anticoagulant therapy (warfarin/enoxaparin)       canaliflozin tablet    INVOKANA     Take 100 mg by mouth every morning (before breakfast)       cyanocobalamin 1000 MCG tablet    vitamin  B-12     Take 2 tablets by mouth daily       fluticasone 50 MCG/ACT spray    FLONASE    15 g    Spray 2 sprays into both nostrils daily       glipiZIDE 10 MG 24 hr tablet    glipiZIDE XL     "90 tablet    Take 1 tablet (10 mg) by mouth daily       hydrochlorothiazide 12.5 MG Tabs tablet     90 tablet    Take 1 tablet (12.5 mg) by mouth daily       insulin aspart 100 UNITS/ML injection    NovoLOG FLEXpen    15 mL    Inject under the skin 3 times daily before meals as directed by sliding scale.       insulin pen needle 31G X 8 MM    B-D U/F    1 each    Use 3-5 times daily as directed.       insulin syringe-needle U-100 30G X 1/2\" 0.5 ML    BD insulin syringe ULTRAFINE    100 each    Use daily as directed.       LANTUS VIAL 100 UNIT/ML injection   Generic drug:  insulin glargine      Inject 50 Units Subcutaneous 2 times daily.       lisinopril 20 MG tablet    PRINIVIL/ZESTRIL    180 tablet    Take 1 tablet (20 mg) by mouth 2 times daily       metFORMIN 1000 MG tablet    GLUCOPHAGE    180 tablet    Take 1 tablet (1,000 mg) by mouth 2 times daily (with meals)       metoprolol 50 MG 24 hr tablet    TOPROL-XL    90 tablet    Take 1 tablet (50 mg) by mouth daily       order for DME      DREAMSTATION 5-15CM/H20 NASAL COMFORT BLUE       order for DME     1 Device    Equipment being ordered: diabetic shoes       rosuvastatin 20 MG tablet    CRESTOR    90 tablet    Take 1 tablet (20 mg) by mouth daily       Vitamin D3 5000 UNITS Chew      Take 1 capsule by mouth daily       XARELTO 20 MG Tabs tablet   Generic drug:  rivaroxaban ANTICOAGULANT     90 tablet    TAKE 1 TABLET (20 MG) BY MOUTH DAILY (WITH DINNER)         "

## 2017-03-08 NOTE — PATIENT INSTRUCTIONS
Recommendations from today's MTM visit:                                                    MTM (medication therapy management) is a service provided by a clinical pharmacist designed to help you get the most of out of your medicines.   Today we reviewed what your medicines are for, how to know if they are working, that your medicines are safe and how to make your medicine regimen as easy as possible.     1. Please contact Tosin or myself with any questions you have about Lyrica (pregabalin) or gabapentin.     2. We will check A1c today.     Next MTM visit: 3 months    To schedule another MTM appointment, please call the clinic directly or you may call the MTM scheduling line at [phone] or toll-free at [phone].     My Clinical Pharmacist's contact information:                                                      It was a pleasure seeing you today!  Please feel free to contact me with any questions or concerns you have.      Chantel Quinteros, PharmD  [facility] Res[facility]  [phone]      You may receive a survey about the MTM services you received.  I would appreciate your feedback to help me serve you better in the future. Please fill it out and return it when you can. Your comments will be anonymous.

## 2017-03-08 NOTE — PROGRESS NOTES
"SUBJECTIVE/OBJECTIVE:                                                    Te Yoder is a 66 year old male coming in for a follow-up visit for Medication Therapy Management.  He was referred to me from Dr. Brown. This serves as an initial visit for 2017.     Chief Complaint: Follow up from visit with Tosin Gillette PharmD on 16.    Patient will be having PAD test later today and is feeling concerned about this.     Social History: Patient is retired. 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     Tobacco: No tobacco use   Alcohol: 1-3 beverages / week    Medication Adherence: no issues reported by patient    Diabetes:  Pt currently taking Invokana 100mg daily, glipizide XL 10mg daily, Lantus 50 units BID, metformin 1000mg BID and Novolog if needed for BG >300mg/dL.  Pt is not experiencing side effects.    SMBG: 3-4 times daily. Fastin-125mg/dL. Reports mostly in goal  90-150mg/dL.- he didn't bring his glucometer to our visit today  Symptoms of low blood sugar? Sweaty, shaky. Frequency of hypoglycemia? Infrequent.  Recent symptoms of high blood sugar? none  Eye exam: up to date  Foot exam: up to date  Microalbumin is not < 30 mg/g. Pt is taking an ACEi/ARB.  Aspirin: Not taking due to Xarelto use  Diet/Exercise: Goes to gym once a week.   Breakfast: toast and coffee. Does not cook.   Patient reports getting new CPAP machine recently and using is nightly.     Peripheral Neuropathy: Patient takes no medications for this. He reports that he notices a change in sensation in his feet similar to noticing a \"rock in your shoe\" that is not painful. He gives it a 1 on a scale of 0 to 10 (10 being most painful). He is not bothered by the change in sensation in his feet and does not think he needs medication.     Hypertension/A. Fib: Current medications include HCTZ 12.5mg daily, lisionpril 20mg BID, metoprolol XL 50mg daily and Xarelto 20mg daily.  Patient does not self-monitor BP.  Patient reports no current medication " side effects including s/s bruising/bleeding.    Hyperlipidemia: Current therapy includes rosuvastatin 20mg. Patient denies muscle pains. Patient is tolerating this well. Is interested to see effects of this new medication from his baseline labs from 1/13/17.    Allergies: Will use fluticasone 2 sprays when needed. Also takes a oral antihistamine tablet (Unknown name) as needed. Typically uses these in the summer. Reports these are effective when needed.    Supplements: Include Vitamin D3 5000 units daily, vitamin B-12 2,000 mcg daily. Patient reports no side effects.     CrCl using Cockroft-Gault and IBW of 70.7kg = 64.9mL/min. ABW = 84 mL/min    Current labs include:  BP Readings from Last 3 Encounters:   03/06/17 126/59   01/30/17 138/70   01/13/17 111/58     Today's Vitals: /68  Wt 273 lb (123.8 kg)  BMI 40.32 kg/m2  Lab Results   Component Value Date    A1C 7.4 12/12/2016   .  Lab Results   Component Value Date    CHOL 130 01/13/2017     Lab Results   Component Value Date    TRIG 121 01/13/2017     Lab Results   Component Value Date    HDL 31 01/13/2017     Lab Results   Component Value Date    LDL 75 01/13/2017    LDL 99 01/29/2016    LDL 92 12/13/2010       Liver Function Studies -   Recent Labs   Lab Test  01/13/17   1334   PROTTOTAL  7.2   ALBUMIN  3.8   BILITOTAL  0.6   ALKPHOS  70   AST  22   ALT  29       Lab Results   Component Value Date    UCRR 60 09/23/2016    MICROL 92 09/23/2016    UMALCR 152.57 (H) 09/23/2016       Last Basic Metabolic Panel:  Lab Results   Component Value Date     09/23/2016      Lab Results   Component Value Date    POTASSIUM 4.0 12/12/2016     Lab Results   Component Value Date    CHLORIDE 105 09/23/2016     Lab Results   Component Value Date    BUN 25 09/23/2016     Lab Results   Component Value Date    CR 1.12 12/12/2016     GFR Estimate   Date Value Ref Range Status   12/12/2016 68 >OR=60 ml/min/1.73m2 Final   09/23/2016 65 >60 mL/min/1.7m2 Final     Comment:      Non  GFR Calc   05/11/2016 66 >60 mL/min/1.7m2 Final     Comment:     Non  GFR Calc       TSH   Date Value Ref Range Status   01/29/2016 2.80 0.40 - 4.00 mU/L Final   ]    Most Recent Immunizations   Administered Date(s) Administered     Influenza (H1N1) 12/02/2009     Influenza (High Dose) 3 valent vaccine 09/23/2016     Influenza (IIV3) 09/20/2013     Influenza Vaccine IM 3yrs+ 4 Valent IIV4 10/27/2014     Mantoux 08/10/2016     Pneumococcal (PCV 13) 01/29/2016     Pneumococcal 23 valent 03/06/2017     TDAP (ADACEL AGES 11-64) 07/16/2012     Zoster vaccine, live 09/06/2011     ASSESSMENT:                                                    Current medications were reviewed today as discussed above.      Medication Adherence: no issues identified    Diabetes: Needs Improvement. Patient is meeting A1c goal of < 8%. Patient showed interest in updated A1c.     Peripheral Neuropathy: Plan in place with Dr. Brown    Hypertension/A.Fib: Stable. Patient is meeting BP goal of < 140/90mmHg.     Hyperlipidemia: Stable. Pt is on high intensity statin which is indicated based on 2013 ACC/AHA guidelines for lipid management.      Allergies: unable to fully assess d/t lack of information    Supplements: Stable     PLAN:                                                      1. Lab today: A1c  2. Answered patient questions about Pregabalin and gabapentin  3. Encouraged patient to use CPAP nightly    I spent 45 minutes with this patient today.  All changes were made via verbal approval with Dr. Paolo Brown. A copy of the visit note was provided to the patient's primary care provider.     Will follow up later today [facility]    The patient was given a summary of these recommendations as an after visit summary.    Chantel Quinteros, PharmD  [facility] Resident  [phone]    The patient was seen independently by Dr. Quinteros.  I have read the note and agree with the  assessment and plan.  Tosin Gillette, LaineyD, [facility]  Medication Therapy Management Provider  Pager: [phone]

## 2017-03-17 PROCEDURE — 82274 ASSAY TEST FOR BLOOD FECAL: CPT | Performed by: PREVENTIVE MEDICINE

## 2017-03-20 DIAGNOSIS — Z12.11 COLON CANCER SCREENING: ICD-10-CM

## 2017-03-20 LAB — HEMOCCULT STL QL IA: NEGATIVE

## 2017-03-22 ENCOUNTER — OFFICE VISIT (OUTPATIENT)
Dept: SLEEP MEDICINE | Facility: CLINIC | Age: 67
End: 2017-03-22
Payer: COMMERCIAL

## 2017-03-22 VITALS
BODY MASS INDEX: 39.78 KG/M2 | WEIGHT: 268.6 LBS | SYSTOLIC BLOOD PRESSURE: 127 MMHG | DIASTOLIC BLOOD PRESSURE: 66 MMHG | TEMPERATURE: 98.8 F | OXYGEN SATURATION: 93 % | HEIGHT: 69 IN | HEART RATE: 73 BPM

## 2017-03-22 DIAGNOSIS — G47.33 OSA (OBSTRUCTIVE SLEEP APNEA): Primary | ICD-10-CM

## 2017-03-22 PROCEDURE — 99213 OFFICE O/P EST LOW 20 MIN: CPT | Performed by: PSYCHIATRY & NEUROLOGY

## 2017-03-22 ASSESSMENT — PAIN SCALES - GENERAL: PAINLEVEL: NO PAIN (0)

## 2017-03-22 NOTE — MR AVS SNAPSHOT
After Visit Summary   3/22/2017    Te Yoder    MRN: [id number]           Patient Information     Date Of Birth          1950        Visit Information        Provider Department      3/22/2017 9:30 AM Alexandr Atkinson MD [facility] [place]        Today's Diagnoses     ROC (obstructive sleep apnea)    -  1      Care Instructions      Your BMI is Body mass index is 39.67 kg/(m^2).  Weight management is a personal decision.  If you are interested in exploring weight loss strategies, the following discussion covers the approaches that may be successful. Body mass index (BMI) is one way to tell whether you are at a healthy weight, overweight, or obese. It measures your weight in relation to your height.  A BMI of 18.5 to 24.9 is in the healthy range. A person with a BMI of 25 to 29.9 is considered overweight, and someone with a BMI of 30 or greater is considered obese. More than two-thirds of American adults are considered overweight or obese.  Being overweight or obese increases the risk for further weight gain. Excess weight may lead to heart disease and diabetes.  Creating and following plans for healthy eating and physical activity may help you improve your health.  Weight control is part of healthy lifestyle and includes exercise, emotional health, and healthy eating habits. Careful eating habits lifelong are the mainstay of weight control. Though there are significant health benefits from weight loss, long-term weight loss with diet alone may be very difficult to achieve- studies show long-term success with dietary management in less than 10% of people. Attaining a healthy weight may be especially difficult to achieve in those with severe obesity. In some cases, medications, devices and surgical management might be considered.  What can you do?  If you are overweight or obese and are interested in methods for weight loss, you should discuss this with your provider.      Consider reducing daily calorie intake by 500 calories.     Keep a food journal.     Avoiding skipping meals, consider cutting portions instead.    Diet combined with exercise helps maintain muscle while optimizing fat loss. Strength training is particularly important for building and maintaining muscle mass. Exercise helps reduce stress, increase energy, and improves fitness. Increasing exercise without diet control, however, may not burn enough calories to loose weight.       Start walking three days a week 10-20 minutes at a time    Work towards walking thirty minutes five days a week     Eventually, increase the speed of your walking for 1-2 minutes at time    In addition, we recommend that you review healthy lifestyles and methods for weight loss available through the National Institutes of Health patient information sites:  http://win.niddk.nih.gov/publications/index.htm    And look into health and wellness programs that may be available through your health insurance provider, employer, local community center, or robert club.    Weight management plan: Patient was referred to their PCP to discuss a diet and exercise plan.            Follow-ups after your visit        Your next 10 appointments already scheduled     May 17, 2017  4:00 PM CDT   Office Visit with Paolo Brown MD   [facility]    [address] [facility] [address] [phone]           Bring a current list of meds and any records pertaining to this visit.  For Physicals, please bring immunization records and any forms needing to be filled out.  Please arrive 10 minutes early to complete paperwork.              Who to contact     If you have questions or need follow up information about today's clinic visit or your schedule please contact [facility] directly at [phone].  Normal or non-critical lab and imaging results will be communicated to you by  "MyChart, letter or phone within 4 business days after the clinic has received the results. If you do not hear from us within 7 days, please contact the clinic through Shipping Easy[employer] or phone. If you have a critical or abnormal lab result, we will notify you by phone as soon as possible.  Submit refill requests through Friend Trusted or call your pharmacy and they will forward the refill request to us. Please allow 3 business days for your refill to be completed.          Additional Information About Your Visit        Shipping Easy[employer]Kier Information     [employer] gives you secure access to your electronic health record. If you see a primary care provider, you can also send messages to your care team and make appointments. If you have questions, please call your primary care clinic.  If you do not have a primary care provider, please call [phone] and they will assist you.        Care EveryWhere ID     This is your Care EveryWhere ID. This could be used by other organizations to access your [place] medical records  [id number]        Your Vitals Were     Pulse Temperature Height Pulse Oximetry BMI (Body Mass Index)       73 98.8  F (37.1  C) (Oral) 1.753 m (5' 9\") 93% 39.67 kg/m2        Blood Pressure from Last 3 Encounters:   03/22/17 127/66   03/08/17 132/68   03/06/17 126/59    Weight from Last 3 Encounters:   03/22/17 121.8 kg (268 lb 9.6 oz)   03/08/17 123.8 kg (273 lb)   03/06/17 122.8 kg (270 lb 12.8 oz)              Today, you had the following     No orders found for display       Primary Care Provider Office Phone # Fax #    [place] Geraldo Brown [phone]       [facility] [address]  [place] [address]        Thank you!     Thank you for choosing [facility]  for your care. Our goal is always to provide you with excellent care. Hearing back from our patients is one way we can continue to improve our services. Please take a few minutes to complete the " "written survey that you may receive in the mail after your visit with us. Thank you!             Your Updated Medication List - Protect others around you: Learn how to safely use, store and throw away your medicines at www.disposemymeds.org.          This list is accurate as of: 3/22/17 10:05 AM.  Always use your most recent med list.                   Brand Name Dispense Instructions for use    ACCU-CHEK CLAUDIO      Test 3-4 times daily as directed       ASPIRIN NOT PRESCRIBED    INTENTIONAL     Reported on 3/8/2017       canaliflozin tablet    INVOKANA     Take 100 mg by mouth every morning (before breakfast)       cyanocobalamin 1000 MCG tablet    vitamin  B-12     Take 2 tablets by mouth daily       glipiZIDE 10 MG 24 hr tablet    glipiZIDE XL    90 tablet    Take 1 tablet (10 mg) by mouth daily       hydrochlorothiazide 12.5 MG Tabs tablet     90 tablet    Take 1 tablet (12.5 mg) by mouth daily       insulin aspart 100 UNITS/ML injection    NovoLOG FLEXpen    15 mL    Inject under the skin 3 times daily before meals as directed by sliding scale.       insulin pen needle 31G X 8 MM    B-D U/F    1 each    Use 3-5 times daily as directed.       insulin syringe-needle U-100 30G X 1/2\" 0.5 ML    BD insulin syringe ULTRAFINE    100 each    Use daily as directed.       LANTUS VIAL 100 UNIT/ML injection   Generic drug:  insulin glargine      Inject 50 Units Subcutaneous 2 times daily.       lisinopril 20 MG tablet    PRINIVIL/ZESTRIL    180 tablet    Take 1 tablet (20 mg) by mouth 2 times daily       metFORMIN 1000 MG tablet    GLUCOPHAGE    180 tablet    Take 1 tablet (1,000 mg) by mouth 2 times daily (with meals)       metoprolol 50 MG 24 hr tablet    TOPROL-XL    90 tablet    Take 1 tablet (50 mg) by mouth daily       order for DME      DREAMSTATION 5-15CM/H20 NASAL COMFORT BLUE       order for DME     1 Device    Equipment being ordered: diabetic shoes       rosuvastatin 20 MG tablet    CRESTOR    90 tablet    Take " 1 tablet (20 mg) by mouth daily       Vitamin D3 5000 UNITS Chew      Take 1 capsule by mouth daily       XARELTO 20 MG Tabs tablet   Generic drug:  rivaroxaban ANTICOAGULANT     90 tablet    TAKE 1 TABLET (20 MG) BY MOUTH DAILY (WITH DINNER)

## 2017-03-22 NOTE — PATIENT INSTRUCTIONS

## 2017-03-22 NOTE — NURSING NOTE
"Chief Complaint   Patient presents with     Sleep Problem     f/u cpap       Initial /66 (BP Location: Right arm, Patient Position: Chair, Cuff Size: Adult Large)  Pulse 73  Temp 98.8  F (37.1  C) (Oral)  Ht 1.753 m (5' 9\")  Wt 121.8 kg (268 lb 9.6 oz)  SpO2 93%  BMI 39.67 kg/m2 Estimated body mass index is 39.67 kg/(m^2) as calculated from the following:    Height as of this encounter: 1.753 m (5' 9\").    Weight as of this encounter: 121.8 kg (268 lb 9.6 oz).  Medication Reconciliation: complete    ESS: 7  Malinda Riley      "

## 2017-03-22 NOTE — PROGRESS NOTES
FOLLOWUP NOTE      Mr. Meg Woodruff is 66 years old.  He has a history of sleep disordered breathing originally diagnosed in  and has done exceptionally well on CPAP.  Because he had an old machine, we switched him over to a new auto-titrating machine in January.  This is a followup for that new therapy.  He continues to use the device every single night, usually for about 5 hours a night and his apnea-hypopnea index appears to be reasonably well treated at 5.3 apnea/hypopneas per hour.  After reviewing how he is doing, which symptomatically is quite well, we will not make any changes.  We have discussed again the potential benefits of sleep extension; however, he indicates to me that he is constitutionally a short sleeper.  He does take naps in the afternoon and we have discussed the possible benefits of using CPAP when he naps.      One final issue, he does have some mild degree of diabetic neuropathy, no clear motor restlessness, but he asked me my opinion about Lyrica for possible diabetic neuropathy.  I think it is often quite helpful, especially in the evening, as it can take care of those symptoms and allow him to get a better night's sleep.  He was happy to hear that and he will talk to his primary care provider about it.      All other questions have been answered.  It is a great privilege being asked to participate in his care.  He should not operate a motor vehicle if he is tired or sleepy.  Fifteen minutes were spent with patient, greater than 50% of the time in counseling and coordination of care.         KATTY VELA MD             D: 2017 10:05   T: 2017 10:31   MT: CD      Name:     MEG WOODRUFF   MRN:      [id number]        Account:      [id number]   :      1950           Visit Date:   2017      Document: [id number]

## 2017-04-15 DIAGNOSIS — I48.91 ATRIAL FIBRILLATION, UNSPECIFIED TYPE (H): ICD-10-CM

## 2017-04-17 NOTE — TELEPHONE ENCOUNTER
Pending Prescriptions:                       Disp   Refills    XARELTO 20 MG TABS tablet [Pharmacy Med Na*90 tab*0        Sig: TAKE ONE TABLET BY MOUTH ONE TIME DAILY WITH DINNER               Last Written Prescription Date: 10/18/2016  Last Fill Quantity: 90, # refills: 1    Last Office Visit with [facility], [facility] or [facility] prescribing provider:  03/06/2017   Future Office Visit:    Next 5 appointments (look out 90 days)     May 17, 2017  4:00 PM CDT   Office Visit with Paolo Brown MD   [facility]    [address] [facility] [address]   [phone]                   Lab Results   Component Value Date    WBC 8.0 01/13/2017     Lab Results   Component Value Date    RBC 4.67 01/13/2017     Lab Results   Component Value Date    HGB 13.7 01/13/2017     Lab Results   Component Value Date    HCT 44.2 01/13/2017     No components found for: MCT  Lab Results   Component Value Date    MCV 95 01/13/2017     Lab Results   Component Value Date    MCH 29.3 01/13/2017     Lab Results   Component Value Date    MCHC 31.0 01/13/2017     Lab Results   Component Value Date    RDW 13.8 01/13/2017     Lab Results   Component Value Date     01/13/2017     Lab Results   Component Value Date    AST 22 01/13/2017     Lab Results   Component Value Date    ALT 29 01/13/2017     Creatinine   Date Value Ref Range Status   12/12/2016 1.12 0.70 - 1.25 mg/dL Final   ]

## 2017-04-18 RX ORDER — RIVAROXABAN 20 MG/1
TABLET, FILM COATED ORAL
Qty: 90 TABLET | Refills: 1 | Status: SHIPPED | OUTPATIENT
Start: 2017-04-18 | End: 2017-07-13

## 2017-05-13 DIAGNOSIS — I10 ESSENTIAL HYPERTENSION WITH GOAL BLOOD PRESSURE LESS THAN 140/90: ICD-10-CM

## 2017-05-15 RX ORDER — LISINOPRIL 20 MG/1
TABLET ORAL
Qty: 180 TABLET | Refills: 1 | Status: SHIPPED | OUTPATIENT
Start: 2017-05-15 | End: 2017-07-13

## 2017-05-15 NOTE — TELEPHONE ENCOUNTER
lisinopril (PRINIVIL,ZESTRIL) 20 MG tablet 180 tablet 1 9/23/2016           Last Written Prescription Date: 9/23/16  Last Fill Quantity: 180, # refills: 1  Last Office Visit with G, P or [facility] prescribing provider: 3/6/17  Next 5 appointments (look out 90 days)     May 17, 2017  4:00 PM CDT   Office Visit with Paolo Brown MD   [facility]    [address] [address]   [phone]                   Potassium   Date Value Ref Range Status   12/12/2016 4.0 3.5 - 5.3 mmol/L Final     Creatinine   Date Value Ref Range Status   12/12/2016 1.12 0.70 - 1.25 mg/dL Final     BP Readings from Last 3 Encounters:   03/22/17 127/66   03/08/17 132/68   03/06/17 126/59

## 2017-05-17 ENCOUNTER — OFFICE VISIT (OUTPATIENT)
Dept: FAMILY MEDICINE | Facility: CLINIC | Age: 67
End: 2017-05-17
Payer: COMMERCIAL

## 2017-05-17 VITALS
HEART RATE: 64 BPM | BODY MASS INDEX: 39.4 KG/M2 | SYSTOLIC BLOOD PRESSURE: 131 MMHG | TEMPERATURE: 97.1 F | WEIGHT: 266 LBS | OXYGEN SATURATION: 97 % | DIASTOLIC BLOOD PRESSURE: 66 MMHG | HEIGHT: 69 IN

## 2017-05-17 DIAGNOSIS — E08.21 DIABETES MELLITUS DUE TO UNDERLYING CONDITION WITH DIABETIC NEPHROPATHY, WITHOUT LONG-TERM CURRENT USE OF INSULIN (H): ICD-10-CM

## 2017-05-17 DIAGNOSIS — T14.8XXA OPEN WOUND: ICD-10-CM

## 2017-05-17 DIAGNOSIS — R60.1 GENERALIZED EDEMA: Primary | ICD-10-CM

## 2017-05-17 DIAGNOSIS — L85.3 DRY SKIN: ICD-10-CM

## 2017-05-17 LAB
BASOPHILS # BLD AUTO: 0 10E9/L (ref 0–0.2)
BASOPHILS NFR BLD AUTO: 0.4 %
DIFFERENTIAL METHOD BLD: NORMAL
EOSINOPHIL # BLD AUTO: 0.2 10E9/L (ref 0–0.7)
EOSINOPHIL NFR BLD AUTO: 2.5 %
ERYTHROCYTE [DISTWIDTH] IN BLOOD BY AUTOMATED COUNT: 14.3 % (ref 10–15)
HBA1C MFR BLD: 6.8 % (ref 4.3–6)
HCT VFR BLD AUTO: 44.2 % (ref 40–53)
HGB BLD-MCNC: 14.2 G/DL (ref 13.3–17.7)
LYMPHOCYTES # BLD AUTO: 1.5 10E9/L (ref 0.8–5.3)
LYMPHOCYTES NFR BLD AUTO: 19.7 %
MCH RBC QN AUTO: 29.6 PG (ref 26.5–33)
MCHC RBC AUTO-ENTMCNC: 32.1 G/DL (ref 31.5–36.5)
MCV RBC AUTO: 92 FL (ref 78–100)
MONOCYTES # BLD AUTO: 0.6 10E9/L (ref 0–1.3)
MONOCYTES NFR BLD AUTO: 7.2 %
NEUTROPHILS # BLD AUTO: 5.4 10E9/L (ref 1.6–8.3)
NEUTROPHILS NFR BLD AUTO: 70.2 %
PLATELET # BLD AUTO: 165 10E9/L (ref 150–450)
RBC # BLD AUTO: 4.8 10E12/L (ref 4.4–5.9)
WBC # BLD AUTO: 7.8 10E9/L (ref 4–11)

## 2017-05-17 PROCEDURE — 99214 OFFICE O/P EST MOD 30 MIN: CPT | Performed by: PREVENTIVE MEDICINE

## 2017-05-17 PROCEDURE — 83036 HEMOGLOBIN GLYCOSYLATED A1C: CPT | Performed by: PREVENTIVE MEDICINE

## 2017-05-17 PROCEDURE — 85025 COMPLETE CBC W/AUTO DIFF WBC: CPT | Performed by: PREVENTIVE MEDICINE

## 2017-05-17 PROCEDURE — 80053 COMPREHEN METABOLIC PANEL: CPT | Performed by: PREVENTIVE MEDICINE

## 2017-05-17 PROCEDURE — 82607 VITAMIN B-12: CPT | Performed by: PREVENTIVE MEDICINE

## 2017-05-17 PROCEDURE — 36415 COLL VENOUS BLD VENIPUNCTURE: CPT | Performed by: PREVENTIVE MEDICINE

## 2017-05-17 RX ORDER — AMMONIUM LACTATE 12 G/100G
CREAM TOPICAL 2 TIMES DAILY PRN
Qty: 385 G | Refills: 3 | Status: SHIPPED | OUTPATIENT
Start: 2017-05-17 | End: 2017-07-13

## 2017-05-17 RX ORDER — MUPIROCIN CALCIUM 20 MG/G
CREAM TOPICAL 2 TIMES DAILY PRN
Qty: 30 G | Refills: 1 | Status: SHIPPED | OUTPATIENT
Start: 2017-05-17 | End: 2017-07-13

## 2017-05-17 NOTE — NURSING NOTE
"Chief Complaint   Patient presents with     RECHECK       Initial /66 (BP Location: Left arm, Cuff Size: Adult Large)  Pulse 64  Temp 97.1  F (36.2  C) (Tympanic)  Ht 5' 9\" (1.753 m)  Wt 266 lb (120.7 kg)  SpO2 97%  BMI 39.28 kg/m2 Estimated body mass index is 39.28 kg/(m^2) as calculated from the following:    Height as of this encounter: 5' 9\" (1.753 m).    Weight as of this encounter: 266 lb (120.7 kg).  Medication Reconciliation: complete     Ruby Cuenca MA    "

## 2017-05-17 NOTE — MR AVS SNAPSHOT
"              After Visit Summary   5/17/2017    Te Yoder    MRN: [id number]           Patient Information     Date Of Birth          1950        Visit Information        Provider Department      5/17/2017 4:00 PM Paolo Brown MD [facility]         Follow-ups after your visit        Who to contact     If you have questions or need follow up information about today's clinic visit or your schedule please contact [facility] directly at [phone].  Normal or non-critical lab and imaging results will be communicated to you by [employer], letter or phone within 4 business days after the clinic has received the results. If you do not hear from us within 7 days, please contact the clinic through [employer] or phone. If you have a critical or abnormal lab result, we will notify you by phone as soon as possible.  Submit refill requests through [employer] or call your pharmacy and they will forward the refill request to us. Please allow 3 business days for your refill to be completed.          Additional Information About Your Visit        MyChart Information     [employer] gives you secure access to your electronic health record. If you see a primary care provider, you can also send messages to your care team and make appointments. If you have questions, please call your primary care clinic.  If you do not have a primary care provider, please call [phone] and they will assist you.        Care EveryWhere ID     This is your Care EveryWhere ID. This could be used by other organizations to access your [facility] medical records  [id number]        Your Vitals Were     Pulse Temperature Height Pulse Oximetry BMI (Body Mass Index)       64 97.1  F (36.2  C) (Tympanic) 5' 9\" (1.753 m) 97% 39.28 kg/m2        Blood Pressure from Last 3 Encounters:   05/17/17 131/66   03/22/17 127/66   03/08/17 132/68    Weight from Last 3 Encounters:   05/17/17 266 lb (120.7 kg)   03/22/17 268 lb 9.6 " "oz (121.8 kg)   03/08/17 273 lb (123.8 kg)              Today, you had the following     No orders found for display       Primary Care Provider Office Phone # Fax #    Paolo Geraldo Brown [phone]       [facility] [address]  [place] [address]        Thank you!     Thank you for choosing [facility]  for your care. Our goal is always to provide you with excellent care. Hearing back from our patients is one way we can continue to improve our services. Please take a few minutes to complete the written survey that you may receive in the mail after your visit with us. Thank you!             Your Updated Medication List - Protect others around you: Learn how to safely use, store and throw away your medicines at www.disposemymeds.org.          This list is accurate as of: 5/17/17  4:04 PM.  Always use your most recent med list.                   Brand Name Dispense Instructions for use    ACCU-CHEK CLAUDIO      Test 3-4 times daily as directed       ASPIRIN NOT PRESCRIBED    INTENTIONAL     Reported on 3/8/2017       canaliflozin tablet    INVOKANA     Take 100 mg by mouth every morning (before breakfast)       cyanocobalamin 1000 MCG tablet    vitamin  B-12     Take 2 tablets by mouth daily       glipiZIDE 10 MG 24 hr tablet    glipiZIDE XL    90 tablet    Take 1 tablet (10 mg) by mouth daily       hydrochlorothiazide 12.5 MG Tabs tablet     90 tablet    Take 1 tablet (12.5 mg) by mouth daily       insulin aspart 100 UNITS/ML injection    NovoLOG FLEXpen    15 mL    Inject under the skin 3 times daily before meals as directed by sliding scale.       insulin pen needle 31G X 8 MM    B-D U/F    1 each    Use 3-5 times daily as directed.       insulin syringe-needle U-100 30G X 1/2\" 0.5 ML    BD insulin syringe ULTRAFINE    100 each    Use daily as directed.       LANTUS VIAL 100 UNIT/ML injection   Generic drug:  insulin glargine      Inject 50 Units " Subcutaneous 2 times daily.       lisinopril 20 MG tablet    PRINIVIL/ZESTRIL    180 tablet    TAKE 1 TABLET (20 MG) BY MOUTH 2 TIMES DAILY       metFORMIN 1000 MG tablet    GLUCOPHAGE    180 tablet    Take 1 tablet (1,000 mg) by mouth 2 times daily (with meals)       metoprolol 50 MG 24 hr tablet    TOPROL-XL    90 tablet    Take 1 tablet (50 mg) by mouth daily       order for DME      DREAMSTATION 5-15CM/H20 NASAL COMFORT BLUE       order for DME     1 Device    Equipment being ordered: diabetic shoes       rosuvastatin 20 MG tablet    CRESTOR    90 tablet    Take 1 tablet (20 mg) by mouth daily       Vitamin D3 5000 UNITS Chew      Take 1 capsule by mouth daily       XARELTO 20 MG Tabs tablet   Generic drug:  rivaroxaban ANTICOAGULANT     90 tablet    TAKE ONE TABLET BY MOUTH ONE TIME DAILY WITH DINNER

## 2017-05-17 NOTE — PROGRESS NOTES
"SUBJECTIVE:  Te Yoder, a 66 year old male scheduled an appointment to discuss the following issues:     Generalized edema  Diabetes mellitus due to underlying condition with diabetic nephropathy, without long-term current use of insulin (H)  Dry skin  Open wound  Pt with lower extremity swelling, improves by morning  Also dry skin on bottom of feet  Has had 1 or 2 wounds on lower legs that are slow to heal  Recent US JAVIER normal      Medical, social, surgical, and family histories reviewed.    ROS:  C: NEGATIVE for fever, chills  E: NEGATIVE for vision changes   R: NEGATIVE for significant cough or SOB  CV: NEGATIVE for chest pain, palpitations   GI: NEGATIVE for nausea, abdominal pain, heartburn, or change in bowel habits  : NEGATIVE for frequency, dysuria, or hematuria  M: NEGATIVE for significant arthralgias or myalgia  N: NEGATIVE for weakness, dizziness or paresthesias or headache    OBJECTIVE:  /66 (BP Location: Left arm, Cuff Size: Adult Large)  Pulse 64  Temp 97.1  F (36.2  C) (Tympanic)  Ht 5' 9\" (1.753 m)  Wt 266 lb (120.7 kg)  SpO2 97%  BMI 39.28 kg/m2  EXAM:  GENERAL APPEARANCE: healthy, alert and no distress  EYES: EOMI,  PERRL  HENT: ear canals and TM's normal and nose and mouth without ulcers or lesions  RESP: lungs clear to auscultation - no rales, rhonchi or wheezes  CV: regular rates and rhythm, normal S1 S2, no S3 or S4 and no murmur, click or rub -  ABDOMEN:  soft, nontender, no HSM or masses and bowel sounds normal  EXTREMITIES:  Warm, well perfused, 1+ edema rle, trace edema lle, lipodermatosclerosis present  FEET - normal dp and pt pulses, normal light touch sensation, thickened skin on bottom of foot  Neck - no LAD, TM, JVD    ASSESSMENT/PLAN:  (R60.1) Generalized edema  (primary encounter diagnosis)  Plan: order for DME  Advised knee high compression stockings  This will help to prevent venous ulcers in future  Also weight loss advised    (E08.21) Diabetes mellitus due to " underlying condition with diabetic nephropathy, without long-term current use of insulin (H)  Plan: Comprehensive metabolic panel, CBC with         platelets and differential, Hemoglobin A1c,         Vitamin B12    (L85.3) Dry skin  Plan: ammonium lactate (LAC-HYDRIN) 12 % cream  Advised lac hydrin to help with this to bottom of feet to help with this    (T14.8) Open wound  Plan: mupirocin (BACTROBAN) 2 % cream  Advise bactroban for skin ulcers if/when they occur    25 minutes spent with patient, over 50% time counseling, coordinating care and explaining about nature of the patient's conditions.    All risks, benefits of treatment and further evaluation was reviewed with patient.  Pt expressed understanding.  Pt was in agreement with this plan.  Paolo Brown MD

## 2017-05-17 NOTE — NURSING NOTE
"Chief Complaint   Patient presents with     RECHECK       Initial /63 (BP Location: Left arm, Cuff Size: Adult Large)  Pulse 64  Temp 97.1  F (36.2  C) (Tympanic)  Ht 5' 9\" (1.753 m)  Wt 266 lb (120.7 kg)  SpO2 97%  BMI 39.28 kg/m2 Estimated body mass index is 39.28 kg/(m^2) as calculated from the following:    Height as of this encounter: 5' 9\" (1.753 m).    Weight as of this encounter: 266 lb (120.7 kg).  Medication Reconciliation: complete     Ruby Cuenca MA    "

## 2017-05-18 LAB
ALBUMIN SERPL-MCNC: 3.9 G/DL (ref 3.4–5)
ALP SERPL-CCNC: 69 U/L (ref 40–150)
ALT SERPL W P-5'-P-CCNC: 30 U/L (ref 0–70)
ANION GAP SERPL CALCULATED.3IONS-SCNC: 6 MMOL/L (ref 3–14)
AST SERPL W P-5'-P-CCNC: 28 U/L (ref 0–45)
BILIRUB SERPL-MCNC: 0.5 MG/DL (ref 0.2–1.3)
BUN SERPL-MCNC: 26 MG/DL (ref 7–30)
CALCIUM SERPL-MCNC: 9.4 MG/DL (ref 8.5–10.1)
CHLORIDE SERPL-SCNC: 108 MMOL/L (ref 94–109)
CO2 SERPL-SCNC: 28 MMOL/L (ref 20–32)
CREAT SERPL-MCNC: 1.11 MG/DL (ref 0.66–1.25)
GFR SERPL CREATININE-BSD FRML MDRD: 66 ML/MIN/1.7M2
GLUCOSE SERPL-MCNC: 85 MG/DL (ref 70–99)
POTASSIUM SERPL-SCNC: 4.3 MMOL/L (ref 3.4–5.3)
PROT SERPL-MCNC: 7.4 G/DL (ref 6.8–8.8)
SODIUM SERPL-SCNC: 142 MMOL/L (ref 133–144)
VIT B12 SERPL-MCNC: 1108 PG/ML (ref 193–986)

## 2017-06-28 DIAGNOSIS — I48.19 PERSISTENT ATRIAL FIBRILLATION (H): ICD-10-CM

## 2017-06-28 NOTE — TELEPHONE ENCOUNTER
Reason for Call:  Medication or medication refill:    Do you use a [facility]?  Name of the pharmacy and phone number for the current request:  [id number] - [place], [place] - [address]      Name of the medication requested: metoprolol (TOPROL-XL) 50 MG 24 hr tablet    Other request: made pt aware we can send the request as a one time courtesy  But they should be contacting the pharmacy directly  For all refill requests     Can we leave a detailed message on this number? YES    Phone number patient can be reached at: Home number on file [phone] (home)    Best Time: anytime    Call taken on 6/28/2017 at 4:22 PM by Ruby Valenzuela

## 2017-06-28 NOTE — TELEPHONE ENCOUNTER
Metoprolol      Last Written Prescription Date: 09/23/16  Last Fill Quantity: 90, # refills: 1  Last Office Visit with G, P or [facility] prescribing provider: 05/17/17  Next 5 appointments (look out 90 days)     Jul 13, 2017  3:30 PM CDT   [facility] Visit with Delta Kim MD   [facility]    [address] [facility] [address]   [phone]                   Potassium   Date Value Ref Range Status   05/17/2017 4.3 3.4 - 5.3 mmol/L Final     Creatinine   Date Value Ref Range Status   05/17/2017 1.11 0.66 - 1.25 mg/dL Final     BP Readings from Last 3 Encounters:   05/17/17 131/66   03/22/17 127/66   03/08/17 132/68     Ruby Cuenca MA

## 2017-06-29 NOTE — TELEPHONE ENCOUNTER
GB,  Refill request for previous Dr Brown patient.  Pt scheduled to establish with you 7/13/2017  Please approve refill if appropriate.  Thanks,  Trina GUO RN

## 2017-07-03 RX ORDER — METOPROLOL SUCCINATE 50 MG/1
50 TABLET, EXTENDED RELEASE ORAL DAILY
Qty: 90 TABLET | Refills: 1 | Status: SHIPPED | OUTPATIENT
Start: 2017-07-03 | End: 2017-07-13

## 2017-07-10 ENCOUNTER — OFFICE VISIT (OUTPATIENT)
Dept: PHARMACY | Facility: CLINIC | Age: 67
End: 2017-07-10
Payer: COMMERCIAL

## 2017-07-10 DIAGNOSIS — I73.9 PVD (PERIPHERAL VASCULAR DISEASE) (H): ICD-10-CM

## 2017-07-10 DIAGNOSIS — E08.22 DIABETES MELLITUS DUE TO UNDERLYING CONDITION WITH CHRONIC KIDNEY DISEASE, WITH LONG-TERM CURRENT USE OF INSULIN, UNSPECIFIED CKD STAGE (H): Primary | ICD-10-CM

## 2017-07-10 DIAGNOSIS — E78.5 HYPERLIPIDEMIA LDL GOAL <100: ICD-10-CM

## 2017-07-10 DIAGNOSIS — I48.91 ATRIAL FIBRILLATION, UNSPECIFIED TYPE (H): ICD-10-CM

## 2017-07-10 DIAGNOSIS — Z79.4 DIABETES MELLITUS DUE TO UNDERLYING CONDITION WITH CHRONIC KIDNEY DISEASE, WITH LONG-TERM CURRENT USE OF INSULIN, UNSPECIFIED CKD STAGE (H): Primary | ICD-10-CM

## 2017-07-10 DIAGNOSIS — I83.009 VENOUS STASIS ULCER (H): ICD-10-CM

## 2017-07-10 DIAGNOSIS — L97.909 VENOUS STASIS ULCER (H): ICD-10-CM

## 2017-07-10 DIAGNOSIS — L85.3 DRY SKIN: ICD-10-CM

## 2017-07-10 DIAGNOSIS — I10 ESSENTIAL HYPERTENSION WITH GOAL BLOOD PRESSURE LESS THAN 140/90: ICD-10-CM

## 2017-07-10 PROCEDURE — 99607 MTMS BY PHARM ADDL 15 MIN: CPT | Performed by: PHARMACIST

## 2017-07-10 PROCEDURE — 99606 MTMS BY PHARM EST 15 MIN: CPT | Performed by: PHARMACIST

## 2017-07-10 NOTE — PROGRESS NOTES
"SUBJECTIVE/OBJECTIVE:                                                    Te Yoder is a 66 year old male coming in for a follow-up visit for Medication Therapy Management.  He was referred to me from Dr. Kim.     Chief Complaint: Follow up from visit on 3/08/17.  Has concerns about open sores on his shins today.      Tobacco: No tobacco use   Alcohol: 1-3 beverages / week    Medication Adherence: no issues reported by patient - patient has a system in place to get all of his doses in.     Diabetes:  Pt currently taking Invokana 100mg daily, glipizide XL 10mg daily, Lantus 50 units BID, metformin 1000mg BID and Novolog if needed for BG >250mg/dL (does this about once weekly).  He \"eyeballs\" this process and often ends up low in the morning when he guesses.  When asked about what he give himself when he does use the Novolog, he replies \"about 24 units\".  Pt is not experiencing side effects.       SMBG: 3-4 times daily. Fastin-125mg/dL. Reports mostly in goal  90-150mg/dL.- he didn't bring his glucometer to our visit today    This mornin mg/dL    Symptoms of low blood sugar? Sweaty, shaky. Frequency of hypoglycemia? Infrequent (once every other month) - does report measurements in the 60's.  However, he doesn't measure when he feels low while working.  He grabs a pop or glucose tabs and keeps them on hand when he is outside.   Recent symptoms of high blood sugar? none  Eye exam: up to date  Foot exam: up to date  Microalbumin is not < 30 mg/g. Pt is taking an ACEi/ARB.  Aspirin: Not taking due to Xarelto use  Diet/Exercise: Goes to gym once a week.   Breakfast: toast and coffee.     Hypertension/A. Fib: Current medications include HCTZ 12.5mg daily, lisionpril 20mg BID, metoprolol XL 50mg daily and Xarelto 20mg daily.  Patient does not self-monitor BP.  Patient reports no current medication side effects and denies unusual bruising or bleeding.     Hyperlipidemia: Current therapy includes rosuvastatin " "20mg. Patient denies muscle pains or other side effects.     PVD/Venous Stasis Ulcers/Dry skin:  Amlactin is working well. His dry skin has improved significantly.   However, he is still experiencing issues with ulcers on his shins.  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  He was given mupirocin to help with ulcer healing.  The ulcer on his left shin appears to be healing well but the ulcer on the right shin is open and red.  It does not appear pussy but is weeping clear fluid.  He reports using the cream he was given every day but does not endorse using compression stockings.  He requests \"something stronger\" today.     CrCl ~ 65-80 ml/min    Current labs include:  BP Readings from Last 3 Encounters:   05/17/17 131/66   03/22/17 127/66   03/08/17 132/68     Today's Vitals: There were no vitals taken for this visit.     Lab Results   Component Value Date    A1C 6.8 05/17/2017    A1C 6.8 03/08/2017    A1C 7.4 12/12/2016    A1C 6.5 08/15/2016    A1C 6.7 07/21/2016     Recent Labs   Lab Test  01/13/17   1334 07/21/16   01/30/15   0808   11/30/12   0932   CHOL  130  135   < >  123   < >  147   HDL  31*  24*   < >  32*   < >  29*   LDL  75  68   < >  68   < >  94   TRIG  121  215*   < >  113   < >  115   CHOLHDLRATIO   --    --    --   3.8   --   5.0    < > = values in this interval not displayed.     Liver Function Studies -   Recent Labs   Lab Test  01/13/17   1334   PROTTOTAL  7.2   ALBUMIN  3.8   BILITOTAL  0.6   ALKPHOS  70   AST  22   ALT  29     Lab Results   Component Value Date    UCRR 60 09/23/2016    MICROL 92 09/23/2016    UMALCR 152.57 (H) 09/23/2016     Last Basic Metabolic Panel:  Lab Results   Component Value Date     05/17/2017      Lab Results   Component Value Date    POTASSIUM 4.3 05/17/2017     Lab Results   Component Value Date    CHLORIDE 108 05/17/2017     Lab Results   Component Value Date    LEIGH 9.4 05/17/2017     Lab Results   Component Value " Date    CO2 28 05/17/2017     Lab Results   Component Value Date    BUN 26 05/17/2017     Lab Results   Component Value Date    CR 1.11 05/17/2017     Lab Results   Component Value Date    GLC 85 05/17/2017       GFR Estimate   Date Value Ref Range Status   05/17/2017 66 >60 mL/min/1.7m2 Final     Comment:     Non  GFR Calc   12/12/2016 68 >OR=60 ml/min/1.73m2 Final   09/23/2016 65 >60 mL/min/1.7m2 Final     Comment:     Non  GFR Calc     TSH   Date Value Ref Range Status   01/29/2016 2.80 0.40 - 4.00 mU/L Final     Most Recent Immunizations   Administered Date(s) Administered     Influenza (H1N1) 12/02/2009     Influenza (High Dose) 3 valent vaccine 09/23/2016     Influenza (IIV3) 09/20/2013     Influenza Vaccine IM 3yrs+ 4 Valent IIV4 10/27/2014     Mantoux 08/10/2016     Pneumococcal (PCV 13) 01/29/2016     Pneumococcal 23 valent 03/06/2017     TDAP Vaccine (Adacel) 07/16/2012     Zoster vaccine, live 09/06/2011     ASSESSMENT:                                                    Current medications were reviewed today as discussed above.      Medication Adherence: no issues identified    Diabetes: Stable. Patient is meeting A1c goal of < 7%.  However, his nighttime use of Novolog is likely unsafe and is producing hypoglycemia.  Patient would benefit from a sliding scale to use at bedtime. He declines to use a standard correction dose.     Hypertension/Afib: Stable. Patient is meeting BP goal of < 140/90mmHg.     Hyperlipidemia: Stable. Pt is on high intensity statin which is indicated based on 2013 ACC/AHA guidelines for lipid management.      PVD/Venous Stasis Ulcers/Dry skin:  Needs improvement. Patient has open sores on his right shin which are not improved with use of mupirocin cream. He would benefit from use of his compression stockings and perhaps a 6 month trial with pentoxifylline to aid in healing.  Other medications that have been shown to be helpful in the healing of  venous stasis ulcers include simvastatin, aspirin, zinc and flavonoids. Patient is already using a statin and aspirin is not indicated given Xarelto use. Zinc and flavonoids may be helpful with minimal impact in addition to use of pentoxifylline.      PLAN:                                                      1) Use Novolog based on sliding scale for nighttime hyperglycemia.      If BG is:    250-270, give 6 units  271-290, give 7 units  291-310, give 8 units  >310, give 9 units    2) Consider use of OTC zinc or flavonoids for help in healing ulcers.  3) Encourage use of compression stockings.   4) Consider use of pentoxifylline 400 mg TID if PCP feels it is appropriate.     I spent 60 minutes with this patient today.  All changes were made CPA with Dr. Kim. A copy of the visit note was provided to the patient's primary care provider.     Will follow up with SSI info and suggestions for his ulcers via [employer].     The patient was given a summary of these recommendations as an after visit summary via [employer].    Nevaeh Schmitt, Pharm.D., BCACP  Medication Therapy Management Pharmacist  Page/VM:  [phone]

## 2017-07-10 NOTE — Clinical Note
"Patient has some venous ulcers on his shins that aren't healing well.  He was looking for a \"stronger cream\" to help.  I'm not sure he has a good understanding of why these ulcers occur. To aid in healing, pentoxifylline may be an option since he doesn't wear his compression stockings.  [place]"

## 2017-07-10 NOTE — MR AVS SNAPSHOT
After Visit Summary   7/10/2017    Te Yoder    MRN: [id number]           Patient Information     Date Of Birth          1950        Visit Information        Provider Department      7/10/2017 3:00 PM Nevaeh Schmitt, [facility] [place]        Care Instructions    Recommendations from today's MTM visit:                                                      1. Antibiotic cream options for the sores on your legs include clindamycin ointment - you could also consider using a steroid cream with an antibiotic cream to help with healing.     2. I will MyChart message you a range of Novolog to give yourself when your sugars are high at night.      Next MTM visit: I will follow-up in 2 weeks or so to see how your sores are doing and to check in on Novolog dosing.     To schedule another MTM appointment, please call the clinic directly or you may call the MTM scheduling line at [phone] or toll-free at [phone].     My Clinical Pharmacist's contact information:                                                      It was a pleasure seeing you today!  Please feel free to contact me with any questions or concerns you have.      Nevaeh Schmitt, Pharm.D., [facility]  Medication Therapy Management Pharmacist  Page/VM:  [phone]    You may receive a survey about the MTM services you received.  I would appreciate your feedback to help me serve you better in the future. Please fill it out and return it when you can. Your comments will be anonymous.                    Follow-ups after your visit        Your next 10 appointments already scheduled     Jul 13, 2017  3:30 PM CDT   [facility] Visit with Delta Kim MD   [facility]    [address] [facility] [id number] [phone]           Bring a current list of meds and any records pertaining to this visit.  For Physicals, please bring immunization records and any forms needing to be  filled out.  Please arrive 10 minutes early to complete paperwork.              Who to contact     If you have questions or need follow up information about today's clinic visit or your schedule please contact [facility] directly at [phone].  Normal or non-critical lab and imaging results will be communicated to you by MyChart, letter or phone within 4 business days after the clinic has received the results. If you do not hear from us within 7 days, please contact the clinic through [employer] or phone. If you have a critical or abnormal lab result, we will notify you by phone as soon as possible.  Submit refill requests through [employer] or call your pharmacy and they will forward the refill request to us. Please allow 3 business days for your refill to be completed.          Additional Information About Your Visit        [employer] Information     [employer] gives you secure access to your electronic health record. If you see a primary care provider, you can also send messages to your care team and make appointments. If you have questions, please call your primary care clinic.  If you do not have a primary care provider, please call [phone] and they will assist you.        Care EveryWhere ID     This is your Care EveryWhere ID. This could be used by other organizations to access your [facility] medical records  [id number]         Blood Pressure from Last 3 Encounters:   05/17/17 131/66   03/22/17 127/66   03/08/17 132/68    Weight from Last 3 Encounters:   05/17/17 266 lb (120.7 kg)   03/22/17 268 lb 9.6 oz (121.8 kg)   03/08/17 273 lb (123.8 kg)              Today, you had the following     No orders found for display       Primary Care Provider Office Phone # Fax #    Delta Kim [phone]       [facility] [place] [address]  [place] [address]        Equal Access to Services     LYNN LYLES AH: Linda Bhatti, malissa caputo, ruba  rubin solisjuan csatillo ah. So [facility] [phone].    ATENCIÓN: Si crystal riggins, tiene a lundberg disposición servicios gratuitos de asistencia lingüística. Shaheen al [phone].    We comply with applicable federal civil rights laws and [place] laws. We do not discriminate on the basis of race, color, national origin, age, disability sex, sexual orientation or gender identity.            Thank you!     Thank you for choosing [facility]  for your care. Our goal is always to provide you with excellent care. Hearing back from our patients is one way we can continue to improve our services. Please take a few minutes to complete the written survey that you may receive in the mail after your visit with us. Thank you!             Your Updated Medication List - Protect others around you: Learn how to safely use, store and throw away your medicines at www.disposemymeds.org.          This list is accurate as of: 7/10/17  3:36 PM.  Always use your most recent med list.                   Brand Name Dispense Instructions for use Diagnosis    ACCU-CHEK CLAUDIO      Test 3-4 times daily as directed        ammonium lactate 12 % cream    LAC-HYDRIN    385 g    Apply topically 2 times daily as needed for dry skin    Dry skin       ASPIRIN NOT PRESCRIBED    INTENTIONAL     Reported on 3/8/2017        canaliflozin tablet    INVOKANA     Take 100 mg by mouth every morning (before breakfast)        cyanocobalamin 1000 MCG tablet    vitamin  B-12     Take 2 tablets by mouth daily        glipiZIDE 10 MG 24 hr tablet    glipiZIDE XL    90 tablet    Take 1 tablet (10 mg) by mouth daily    Type 2 diabetes mellitus without complication, with long-term current use of insulin (H)       hydrochlorothiazide 12.5 MG Tabs tablet     90 tablet    Take 1 tablet (12.5 mg) by mouth daily    Persistent atrial fibrillation (H)       insulin aspart 100 UNITS/ML injection    NovoLOG FLEXpen    15 mL     "Inject under the skin 3 times daily before meals as directed by sliding scale.    Type 2 diabetes, HbA1c goal < 7% (H)       insulin pen needle 31G X 8 MM    B-D U/F    1 each    Use 3-5 times daily as directed.    Type 2 diabetes, HbA1c goal < 7% (H)       insulin syringe-needle U-100 30G X 1/2\" 0.5 ML    BD insulin syringe ULTRAFINE    100 each    Use daily as directed.        LANTUS VIAL 100 UNIT/ML injection   Generic drug:  insulin glargine      Inject 50 Units Subcutaneous 2 times daily.        lisinopril 20 MG tablet    PRINIVIL/ZESTRIL    180 tablet    TAKE 1 TABLET (20 MG) BY MOUTH 2 TIMES DAILY    Essential hypertension with goal blood pressure less than 140/90       metFORMIN 1000 MG tablet    GLUCOPHAGE    180 tablet    Take 1 tablet (1,000 mg) by mouth 2 times daily (with meals)    Type 2 diabetes mellitus without complication, with long-term current use of insulin (H)       metoprolol 50 MG 24 hr tablet    TOPROL-XL    90 tablet    Take 1 tablet (50 mg) by mouth daily    Persistent atrial fibrillation (H)       mupirocin 2 % cream    BACTROBAN    30 g    Apply topically 2 times daily as needed    Open wound       order for DME      DREAMSTATION 5-15CM/H20 NASAL COMFORT BLUE        * order for DME     1 Device    Equipment being ordered: diabetic shoes    Type 2 diabetes, HbA1c goal < 7% (H)       * order for DME     2 Device    b knee high compression stockings - 15-20 mm Hg    Generalized edema       rosuvastatin 20 MG tablet    CRESTOR    90 tablet    Take 1 tablet (20 mg) by mouth daily    Coronary artery calcification       Vitamin D3 5000 UNITS Chew      Take 1 capsule by mouth daily        XARELTO 20 MG Tabs tablet   Generic drug:  rivaroxaban ANTICOAGULANT     90 tablet    TAKE ONE TABLET BY MOUTH ONE TIME DAILY WITH DINNER    Atrial fibrillation, unspecified type (H)       * Notice:  This list has 2 medication(s) that are the same as other medications prescribed for you. Read the directions " carefully, and ask your doctor or other care provider to review them with you.

## 2017-07-10 NOTE — PATIENT INSTRUCTIONS
Recommendations from today's MTM visit:                                                      1. Antibiotic cream options for the sores on your legs include clindamycin ointment - you could also consider using a steroid cream with an antibiotic cream to help with healing.  <-- upon further research, I'm not sure how useful these things might be for you.  I will write you a [employer] message with more information.     2. I will MyChart message you a range of Novolog to give yourself when your sugars are high at night.      Next MTM visit: I will follow-up in 2 weeks or so to see how your sores are doing and to check in on Novolog dosing.     To schedule another MTM appointment, please call the clinic directly or you may call the MTM scheduling line at [phone] or toll-free at [phone].     My Clinical Pharmacist's contact information:                                                      It was a pleasure seeing you today!  Please feel free to contact me with any questions or concerns you have.      Nevaeh Schmitt, Pharm.D., [facility]  Medication Therapy Management Pharmacist  Page/VM:  [phone]    You may receive a survey about the MTM services you received.  I would appreciate your feedback to help me serve you better in the future. Please fill it out and return it when you can. Your comments will be anonymous.

## 2017-07-13 ENCOUNTER — OFFICE VISIT (OUTPATIENT)
Dept: FAMILY MEDICINE | Facility: CLINIC | Age: 67
End: 2017-07-13
Payer: COMMERCIAL

## 2017-07-13 VITALS
SYSTOLIC BLOOD PRESSURE: 131 MMHG | TEMPERATURE: 97.6 F | HEIGHT: 69 IN | DIASTOLIC BLOOD PRESSURE: 64 MMHG | HEART RATE: 59 BPM | BODY MASS INDEX: 39.4 KG/M2 | WEIGHT: 266 LBS | OXYGEN SATURATION: 96 %

## 2017-07-13 DIAGNOSIS — T14.8XXA OPEN WOUND: ICD-10-CM

## 2017-07-13 DIAGNOSIS — I10 ESSENTIAL HYPERTENSION: Primary | ICD-10-CM

## 2017-07-13 DIAGNOSIS — N18.2 CKD (CHRONIC KIDNEY DISEASE) STAGE 2, GFR 60-89 ML/MIN: ICD-10-CM

## 2017-07-13 DIAGNOSIS — G62.9 PERIPHERAL POLYNEUROPATHY: ICD-10-CM

## 2017-07-13 DIAGNOSIS — I25.10 CORONARY ARTERY CALCIFICATION: ICD-10-CM

## 2017-07-13 DIAGNOSIS — L85.3 DRY SKIN: ICD-10-CM

## 2017-07-13 DIAGNOSIS — I48.19 PERSISTENT ATRIAL FIBRILLATION (H): ICD-10-CM

## 2017-07-13 DIAGNOSIS — I10 ESSENTIAL HYPERTENSION WITH GOAL BLOOD PRESSURE LESS THAN 140/90: ICD-10-CM

## 2017-07-13 DIAGNOSIS — E08.21 DIABETES MELLITUS DUE TO UNDERLYING CONDITION WITH DIABETIC NEPHROPATHY, WITHOUT LONG-TERM CURRENT USE OF INSULIN (H): ICD-10-CM

## 2017-07-13 DIAGNOSIS — G47.33 OSA (OBSTRUCTIVE SLEEP APNEA): ICD-10-CM

## 2017-07-13 DIAGNOSIS — I48.91 ATRIAL FIBRILLATION, UNSPECIFIED TYPE (H): ICD-10-CM

## 2017-07-13 DIAGNOSIS — E66.01 MORBID OBESITY DUE TO EXCESS CALORIES (H): ICD-10-CM

## 2017-07-13 DIAGNOSIS — G58.8 PHRENIC NEUROPATHY: ICD-10-CM

## 2017-07-13 PROCEDURE — 99214 OFFICE O/P EST MOD 30 MIN: CPT | Performed by: INTERNAL MEDICINE

## 2017-07-13 RX ORDER — ROSUVASTATIN CALCIUM 20 MG/1
20 TABLET, COATED ORAL DAILY
Qty: 90 TABLET | Refills: 3 | Status: SHIPPED | OUTPATIENT
Start: 2017-07-13 | End: 2018-07-23

## 2017-07-13 RX ORDER — METOPROLOL SUCCINATE 50 MG/1
50 TABLET, EXTENDED RELEASE ORAL DAILY
Qty: 90 TABLET | Refills: 1 | Status: SHIPPED | OUTPATIENT
Start: 2017-07-13 | End: 2018-04-11

## 2017-07-13 RX ORDER — HYDROCHLOROTHIAZIDE 12.5 MG/1
12.5 TABLET ORAL DAILY
Qty: 90 TABLET | Refills: 1 | Status: SHIPPED | OUTPATIENT
Start: 2017-07-13 | End: 2018-01-12

## 2017-07-13 RX ORDER — MUPIROCIN CALCIUM 20 MG/G
CREAM TOPICAL 2 TIMES DAILY PRN
Qty: 30 G | Refills: 1 | Status: SHIPPED | OUTPATIENT
Start: 2017-07-13 | End: 2019-10-01

## 2017-07-13 RX ORDER — AMMONIUM LACTATE 12 G/100G
CREAM TOPICAL 2 TIMES DAILY PRN
Qty: 385 G | Refills: 3 | Status: SHIPPED | OUTPATIENT
Start: 2017-07-13 | End: 2019-10-01

## 2017-07-13 RX ORDER — LISINOPRIL 20 MG/1
TABLET ORAL
Qty: 180 TABLET | Refills: 1 | Status: SHIPPED | OUTPATIENT
Start: 2017-07-13 | End: 2018-02-20

## 2017-07-13 NOTE — NURSING NOTE
"Chief Complaint   Patient presents with     Establish Care     Recheck Medication       Initial /64 (BP Location: Left arm, Patient Position: Sitting, Cuff Size: Adult Regular)  Pulse 59  Temp 97.6  F (36.4  C) (Tympanic)  Ht 5' 9\" (1.753 m)  Wt 266 lb (120.7 kg)  SpO2 96%  BMI 39.28 kg/m2 Estimated body mass index is 39.28 kg/(m^2) as calculated from the following:    Height as of this encounter: 5' 9\" (1.753 m).    Weight as of this encounter: 266 lb (120.7 kg).  Medication Reconciliation: complete   Yasmin Cornejo- ILAN      "

## 2017-07-13 NOTE — PROGRESS NOTES
SUBJECTIVE:                                                    Te Yoder is a 66 year old male who presents to clinic today for the following health issues:    New Patient/Transfer of Care    Medication Followup of all current medications    Taking Medication as prescribed: yes    Side Effects:  None    Medication Helping Symptoms:  yes     Patient with history of type 2 diabetes, hypertension, and peripheral neuropathy presents to the clinic to establish care and refill his medications. He states that he doesn't have many issues with his health. His first question is that he has sores that are healing slowly and he is looking for a more powerful salve to help with healing. The most current sores have been on his lower right leg for a little over a week. Patient is reluctant to wear support stockings. He states that he thinks the peripheral neuropathy in his feet is worsening. He states that sometimes when he walks he feels paraesthesias in his feet that do not radiate anywhere. He denies any paraesthesias in his feet at night while sleeping.      Problem list and histories reviewed & adjusted, as indicated.  Additional history: as documented    Current Outpatient Prescriptions   Medication Sig Dispense Refill     metoprolol (TOPROL-XL) 50 MG 24 hr tablet Take 1 tablet (50 mg) by mouth daily 90 tablet 1     order for DME b knee high compression stockings - 15-20 mm Hg 2 Device 1     ammonium lactate (LAC-HYDRIN) 12 % cream Apply topically 2 times daily as needed for dry skin 385 g 3     mupirocin (BACTROBAN) 2 % cream Apply topically 2 times daily as needed 30 g 1     lisinopril (PRINIVIL/ZESTRIL) 20 MG tablet TAKE 1 TABLET (20 MG) BY MOUTH 2 TIMES DAILY 180 tablet 1     XARELTO 20 MG TABS tablet TAKE ONE TABLET BY MOUTH ONE TIME DAILY WITH DINNER 90 tablet 1     order for DME DREAMSTATION  5-15CM/H20  NASAL COMFORT BLUE       rosuvastatin (CRESTOR) 20 MG tablet Take 1 tablet (20 mg) by mouth daily 90 tablet 3  "    glipiZIDE (GLIPIZIDE XL) 10 MG 24 hr tablet Take 1 tablet (10 mg) by mouth daily 90 tablet 3     metFORMIN (GLUCOPHAGE) 1000 MG tablet Take 1 tablet (1,000 mg) by mouth 2 times daily (with meals) 180 tablet 1     hydrochlorothiazide 12.5 MG TABS Take 1 tablet (12.5 mg) by mouth daily 90 tablet 1     insulin syringe-needle U-100 (BD INSULIN SYRINGE ULTRAFINE) 30G X 1/2\" 0.5 ML Use daily as directed. 100 each prn     order for DME Equipment being ordered: diabetic shoes 1 Device 1     Cholecalciferol (VITAMIN D3) 5000 UNITS CHEW Take 1 capsule by mouth daily        ASPIRIN NOT PRESCRIBED, INTENTIONAL, Reported on 3/8/2017       insulin pen needle (B-D U/F) 31G X 8 MM Use 3-5 times daily as directed. 1 each PRN     canaliflozin (INVOKANA) tablet Take 100 mg by mouth every morning (before breakfast)       cyanocolbalamin (VITAMIN  B-12) 1000 MCG tablet Take 2 tablets by mouth daily        insulin aspart (NOVOLOG FLEXPEN) SOLN 100 UNIT/ML Inject under the skin 3 times daily before meals as directed by sliding scale. 15 mL 0     ACCU-CHEK CLAUDIO Test 3-4 times daily as directed       LANTUS 100 UNIT/ML SC SOLN Inject 50 Units Subcutaneous 2 times daily.       Allergies   Allergen Reactions     Simvastatin Other (See Comments)     Muscle aches, elevated CK levels     Reviewed and updated as needed this visit by clinical staff  Tobacco  Allergies  Soc Hx      Reviewed and updated as needed this visit by Provider       ROS:  SKIN: sores on medial area of lower leg  Constitutional, HEENT, cardiovascular, pulmonary, gi and gu systems are negative, except as otherwise noted.    This document serves as a record of the services and decisions personally performed and made by Delta Kim MD. It was created on his/her behalf by Brielle Edwards, a trained medical scribe. The creation of this document is based the provider's statements to the medical scribe.  Elizabeth Edwards 3:56 PM, July 13, 2017    OBJECTIVE: " "    /64 (BP Location: Left arm, Patient Position: Sitting, Cuff Size: Adult Regular)  Pulse 59  Temp 97.6  F (36.4  C) (Tympanic)  Ht 1.753 m (5' 9\")  Wt 120.7 kg (266 lb)  SpO2 96%  BMI 39.28 kg/m2  Body mass index is 39.28 kg/(m^2).      Neck was supple without adenopathy or thyromegaly his carotids were normal without bruits  Chest clear to auscultation and percussion  Cardiovascular S1 and S2 are physiologic without murmurs or gallops  Abdomen bowel sounds were normal.  There is no palpable mass or organomegaly, his abdomen was very rotund and he has a medium sized umbilical hernia  Extremities nontender with 2+ pretibial edema with chronic venus stasis changes, and on right anterior tibia he has tannre sized ulcer and a lower linear area of scabbing from recent superficial trauma from a week ago, he has paraesthesias of his feet and lower legs bilaterally  Pulses pedal pulses are as described otherwise his pulses are bilaterally symmetrical throughout without bruits  Skin without significant abnormality    Diagnostic Test Results:  No results found for this or any previous visit (from the past 24 hour(s)).    ASSESSMENT/PLAN:     1. Essential hypertension  -Patient's condition is well controlled.    2. Diabetes mellitus due to underlying condition with diabetic nephropathy, without long-term current use of insulin (H)    3. Morbid obesity due to excess calories (H)    4. Peripheral polyneuropathy (H)    5. Persistent atrial fibrillation (H)  - metoprolol (TOPROL-XL) 50 MG 24 hr tablet; Take 1 tablet (50 mg) by mouth daily  Dispense: 90 tablet; Refill: 1  - hydrochlorothiazide 12.5 MG TABS tablet; Take 1 tablet (12.5 mg) by mouth daily  Dispense: 90 tablet; Refill: 1    6. CKD (chronic kidney disease) stage 2, GFR 60-89 ml/min    7. ROC (obstructive sleep apnea)    8. Phrenic neuropathy    9. Atrial fibrillation, unspecified type (H)  - rivaroxaban ANTICOAGULANT (XARELTO) 20 MG TABS tablet; TAKE ONE " TABLET BY MOUTH ONE TIME DAILY WITH DINNER  Dispense: 90 tablet; Refill: 1    10. Essential hypertension with goal blood pressure less than 140/90  - lisinopril (PRINIVIL/ZESTRIL) 20 MG tablet; TAKE 1 TABLET (20 MG) BY MOUTH 2 TIMES DAILY  Dispense: 180 tablet; Refill: 1    11. Open wound  -Complicated by chronic venus stasis  -Antibiotic ointment with Telfa and a six inch ace bandage daily and if not noticing good healing progression or if having signs or symptoms of infection to return to clinic.  - mupirocin (BACTROBAN) 2 % cream; Apply topically 2 times daily as needed  Dispense: 30 g; Refill: 1    12. Dry skin  - ammonium lactate (LAC-HYDRIN) 12 % cream; Apply topically 2 times daily as needed for dry skin  Dispense: 385 g; Refill: 3    13. Coronary artery calcification  - rosuvastatin (CRESTOR) 20 MG tablet; Take 1 tablet (20 mg) by mouth daily  Dispense: 90 tablet; Refill: 3    14. Type 2 diabetes mellitus without complication, with long-term current use of insulin (H)  -A1c 6.8    15. Type 2 diabetes, HbA1c goal < 7% (H)      -Patient will follow up in October to check his cholesterol and other lab studies and to get a flu shot if sores on his lower right leg are healing well. If sores are not healing well or become infected he will return to clinic sooner.    The information in this document, created by the medical scribe for me, accurately reflects the services I personally performed and the decisions made by me. I have reviewed and approved this document for accuracy prior to leaving the patient care area.  Delta Kim MD  4:18 PM, 07/13/17    eDlta Kim MD  [facility]

## 2017-07-13 NOTE — MR AVS SNAPSHOT
After Visit Summary   7/13/2017    Te Yoder    MRN: [id number]           Patient Information     Date Of Birth          1950        Visit Information        Provider Department      7/13/2017 3:30 PM Delta Kim MD [facility]        Today's Diagnoses     Essential hypertension    -  1    Diabetes mellitus due to underlying condition with diabetic nephropathy, without long-term current use of insulin (H)        Morbid obesity due to excess calories (H)        Peripheral polyneuropathy (H)        Persistent atrial fibrillation (H)        CKD (chronic kidney disease) stage 2, GFR 60-89 ml/min        ROC (obstructive sleep apnea)        Phrenic neuropathy        Atrial fibrillation, unspecified type (H)        Essential hypertension with goal blood pressure less than 140/90        Open wound        Dry skin        Coronary artery calcification           Follow-ups after your visit        Your next 10 appointments already scheduled     Oct 24, 2017  3:30 PM CDT   [facility]   [facility]    [address] [facility] [address] [address] [phone]           Bring a current list of meds and any records pertaining to this visit.  For Physicals, please bring immunization records and any forms needing to be filled out.  Please arrive 10 minutes early to complete paperwork.              Who to contact     If you have questions or need follow up information about today's clinic visit or your schedule please contact [facility] directly at [phone].  Normal or non-critical lab and imaging results will be communicated to you by MyChart, letter or phone within 4 business days after the clinic has received the results. If you do not hear from us within 7 days, please contact the clinic through MyChart or phone. If you have a critical or abnormal lab result, we will notify you by phone as soon as possible.  Submit  "refill requests through [employer] or call your pharmacy and they will forward the refill request to us. Please allow 3 business days for your refill to be completed.          Additional Information About Your Visit        [employer] Information     [employer] gives you secure access to your electronic health record. If you see a primary care provider, you can also send messages to your care team and make appointments. If you have questions, please call your primary care clinic.  If you do not have a primary care provider, please call [phone] and they will assist you.        Care EveryWhere ID     This is your Care EveryWhere ID. This could be used by other organizations to access your [facility] medical records  [id number]        Your Vitals Were     Pulse Temperature Height Pulse Oximetry BMI (Body Mass Index)       59 97.6  F (36.4  C) (Tympanic) 5' 9\" (1.753 m) 96% 39.28 kg/m2        Blood Pressure from Last 3 Encounters:   07/13/17 131/64   05/17/17 131/66   03/22/17 127/66    Weight from Last 3 Encounters:   07/13/17 266 lb (120.7 kg)   05/17/17 266 lb (120.7 kg)   03/22/17 268 lb 9.6 oz (121.8 kg)              Today, you had the following     No orders found for display         Today's Medication Changes          These changes are accurate as of: 7/13/17 11:59 PM.  If you have any questions, ask your nurse or doctor.               These medicines have changed or have updated prescriptions.        Dose/Directions    lisinopril 20 MG tablet   Commonly known as:  PRINIVIL/ZESTRIL   This may have changed:  See the new instructions.   Used for:  Essential hypertension with goal blood pressure less than 140/90   Changed by:  Delta Kim MD        TAKE 1 TABLET (20 MG) BY MOUTH 2 TIMES DAILY   Quantity:  180 tablet   Refills:  1       rivaroxaban ANTICOAGULANT 20 MG Tabs tablet   Commonly known as:  XARELTO   This may have changed:  See the new instructions.   Used for:  Atrial fibrillation, unspecified type (H) "   Changed by:  Delta Kim MD        TAKE ONE TABLET BY MOUTH ONE TIME DAILY WITH DINNER   Quantity:  90 tablet   Refills:  1            Where to get your medicines      These medications were sent to [id number] - [place], [place] - [address]  [address], [place] [address]     Phone:  [phone]     ammonium lactate 12 % cream    hydrochlorothiazide 12.5 MG Tabs tablet    lisinopril 20 MG tablet    metoprolol 50 MG 24 hr tablet    mupirocin 2 % cream    rivaroxaban ANTICOAGULANT 20 MG Tabs tablet    rosuvastatin 20 MG tablet                Primary Care Provider Office Phone # Fax #    Delta Kim [phone]       [facility] [address] [place] [id number]        Equal Access to Services     [facility] : Hadii aad ku hadasho Soomaali, waaxda luqadaha, qaybta kaalmada adeegyada, waxay carin hayaan adejuan castillo . So [facility] [phone].    ATENCIÓN: Si habla español, tiene a lundberg disposición servicios gratuitos de asistencia lingüística. Shaheen al [phone].    We comply with applicable federal civil rights laws and [place] laws. We do not discriminate on the basis of race, color, national origin, age, disability sex, sexual orientation or gender identity.            Thank you!     Thank you for choosing [facility]  for your care. Our goal is always to provide you with excellent care. Hearing back from our patients is one way we can continue to improve our services. Please take a few minutes to complete the written survey that you may receive in the mail after your visit with us. Thank you!             Your Updated Medication List - Protect others around you: Learn how to safely use, store and throw away your medicines at www.disposemymeds.org.          This list is accurate as of: 7/13/17 11:59 PM.  Always use your most recent med list.                   Brand Name Dispense Instructions for use Diagnosis     "ACCU-CHEK CLAUDIO      Test 3-4 times daily as directed        ammonium lactate 12 % cream    LAC-HYDRIN    385 g    Apply topically 2 times daily as needed for dry skin    Dry skin       ASPIRIN NOT PRESCRIBED    INTENTIONAL     Reported on 3/8/2017        canaliflozin tablet    INVOKANA     Take 100 mg by mouth every morning (before breakfast)        cyanocobalamin 1000 MCG tablet    vitamin  B-12     Take 2 tablets by mouth daily        glipiZIDE 10 MG 24 hr tablet    glipiZIDE XL    90 tablet    Take 1 tablet (10 mg) by mouth daily    Type 2 diabetes mellitus without complication, with long-term current use of insulin (H)       hydrochlorothiazide 12.5 MG Tabs tablet     90 tablet    Take 1 tablet (12.5 mg) by mouth daily    Persistent atrial fibrillation (H)       insulin aspart 100 UNITS/ML injection    NovoLOG FLEXpen    15 mL    Inject under the skin 3 times daily before meals as directed by sliding scale.    Type 2 diabetes, HbA1c goal < 7% (H)       insulin pen needle 31G X 8 MM    B-D U/F    1 each    Use 3-5 times daily as directed.    Type 2 diabetes, HbA1c goal < 7% (H)       insulin syringe-needle U-100 30G X 1/2\" 0.5 ML    BD insulin syringe ULTRAFINE    100 each    Use daily as directed.        LANTUS VIAL 100 UNIT/ML injection   Generic drug:  insulin glargine      Inject 50 Units Subcutaneous 2 times daily.        lisinopril 20 MG tablet    PRINIVIL/ZESTRIL    180 tablet    TAKE 1 TABLET (20 MG) BY MOUTH 2 TIMES DAILY    Essential hypertension with goal blood pressure less than 140/90       metFORMIN 1000 MG tablet    GLUCOPHAGE    180 tablet    Take 1 tablet (1,000 mg) by mouth 2 times daily (with meals)    Type 2 diabetes mellitus without complication, with long-term current use of insulin (H)       metoprolol 50 MG 24 hr tablet    TOPROL-XL    90 tablet    Take 1 tablet (50 mg) by mouth daily    Persistent atrial fibrillation (H)       mupirocin 2 % cream    BACTROBAN    30 g    Apply topically 2 " times daily as needed    Open wound       order for DME      DREAMSTATION 5-15CM/H20 NASAL COMFORT BLUE        * order for DME     1 Device    Equipment being ordered: diabetic shoes    Type 2 diabetes, HbA1c goal < 7% (H)       * order for DME     2 Device    b knee high compression stockings - 15-20 mm Hg    Generalized edema       rivaroxaban ANTICOAGULANT 20 MG Tabs tablet    XARELTO    90 tablet    TAKE ONE TABLET BY MOUTH ONE TIME DAILY WITH DINNER    Atrial fibrillation, unspecified type (H)       rosuvastatin 20 MG tablet    CRESTOR    90 tablet    Take 1 tablet (20 mg) by mouth daily    Coronary artery calcification       Vitamin D3 5000 UNITS Chew      Take 1 capsule by mouth daily        * Notice:  This list has 2 medication(s) that are the same as other medications prescribed for you. Read the directions carefully, and ask your doctor or other care provider to review them with you.

## 2017-09-12 ENCOUNTER — ALLIED HEALTH/NURSE VISIT (OUTPATIENT)
Dept: NURSING | Facility: CLINIC | Age: 67
End: 2017-09-12
Payer: COMMERCIAL

## 2017-09-12 DIAGNOSIS — Z23 NEED FOR PROPHYLACTIC VACCINATION AND INOCULATION AGAINST INFLUENZA: Primary | ICD-10-CM

## 2017-09-12 PROCEDURE — 99207 ZZC NO CHARGE NURSE ONLY: CPT

## 2017-09-12 PROCEDURE — 90662 IIV NO PRSV INCREASED AG IM: CPT

## 2017-09-12 PROCEDURE — G0008 ADMIN INFLUENZA VIRUS VAC: HCPCS

## 2017-09-12 NOTE — PROGRESS NOTES
Injectable Influenza Immunization Documentation    1.  Are you sick today? (Fever of 100.5 or higher on the day of the clinic)   No    2.  Have you ever had Guillain-Monroe Syndrome within 6 weeks of an influenza vaccionation?  No    3. Do you have a life-threatening allergy to eggs?  No    4. Do you have a life-threatening allergy to a component of the vaccine? May include antibiotics, gelatin or latex.  No     5. Have you ever had a reaction to a dose of flu vaccine that needed immediate medical attention?  No     Form completed by cliff dos santos

## 2017-09-12 NOTE — MR AVS SNAPSHOT
After Visit Summary   9/12/2017    Te Yoder    MRN: [id number]           Patient Information     Date Of Birth          1950        Visit Information        Provider Department      9/12/2017 3:30 PM CS [place] NURSE [facility]        Today's Diagnoses     Need for prophylactic vaccination and inoculation against influenza    -  1       Follow-ups after your visit        Your next 10 appointments already scheduled     Sep 12, 2017  3:30 PM CDT   Nurse Only with CS [place] NURSE   [facility]    [address] [facility] [id number] [phone]            Oct 24, 2017  3:30 PM CDT   Office Visit with Delta Kim MD   [facility]    [address] [facility] [address] [phone]           Bring a current list of meds and any records pertaining to this visit. For Physicals, please bring immunization records and any forms needing to be filled out. Please arrive 10 minutes early to complete paperwork.              Who to contact     If you have questions or need follow up information about today's clinic visit or your schedule please contact [facility] directly at [phone].  Normal or non-critical lab and imaging results will be communicated to you by MyChart, letter or phone within 4 business days after the clinic has received the results. If you do not hear from us within 7 days, please contact the clinic through [employer] or phone. If you have a critical or abnormal lab result, we will notify you by phone as soon as possible.  Submit refill requests through [employer] or call your pharmacy and they will forward the refill request to us. Please allow 3 business days for your refill to be completed.          Additional Information About Your Visit        [employer] Information     [employer] gives you secure access to your electronic health record. If you see a primary care provider, you can also  send messages to your care team and make appointments. If you have questions, please call your primary care clinic.  If you do not have a primary care provider, please call [phone] and they will assist you.        Care EveryWhere ID     This is your Care EveryWhere ID. This could be used by other organizations to access your [facility] medical records  [id number]         Blood Pressure from Last 3 Encounters:   07/13/17 131/64   05/17/17 131/66   03/22/17 127/66    Weight from Last 3 Encounters:   07/13/17 266 lb (120.7 kg)   05/17/17 266 lb (120.7 kg)   03/22/17 268 lb 9.6 oz (121.8 kg)              We Performed the Following     FLU VACCINE, INCREASED ANTIGEN, PRESV FREE, AGE 65+ [17509]     Vaccine Administration, Initial [01975]        Primary Care Provider Office Phone # Fax #    Delta Kim [phone] [address] [address]        Equal Access to Services     [facility]: Hadii aad ku hadasho Soomaali, waaxda luqadaha, qaybta kaalmada adeegyada, waxay carin haysharath castillo . So [facility] [phone].    ATENCIÓN: Si habla español, tiene a lundberg disposición servicios gratuitos de asistencia lingüística. Llame al [phone].    We comply with applicable federal civil rights laws and [place] laws. We do not discriminate on the basis of race, color, national origin, age, disability sex, sexual orientation or gender identity.            Thank you!     Thank you for choosing [facility]  for your care. Our goal is always to provide you with excellent care. Hearing back from our patients is one way we can continue to improve our services. Please take a few minutes to complete the written survey that you may receive in the mail after your visit with us. Thank you!             Your Updated Medication List - Protect others around you: Learn how to safely use, store and throw away your medicines at www.disposemymeds.org.          This list is  "accurate as of: 9/12/17  3:27 PM.  Always use your most recent med list.                   Brand Name Dispense Instructions for use Diagnosis    ACCU-CHEK CLAUDIO      Test 3-4 times daily as directed        ammonium lactate 12 % cream    LAC-HYDRIN    385 g    Apply topically 2 times daily as needed for dry skin    Dry skin       ASPIRIN NOT PRESCRIBED    INTENTIONAL     Reported on 3/8/2017        canaliflozin tablet    INVOKANA     Take 100 mg by mouth every morning (before breakfast)        cyanocobalamin 1000 MCG tablet    vitamin  B-12     Take 2 tablets by mouth daily        glipiZIDE 10 MG 24 hr tablet    glipiZIDE XL    90 tablet    Take 1 tablet (10 mg) by mouth daily    Type 2 diabetes mellitus without complication, with long-term current use of insulin (H)       hydrochlorothiazide 12.5 MG Tabs tablet     90 tablet    Take 1 tablet (12.5 mg) by mouth daily    Persistent atrial fibrillation (H)       insulin aspart 100 UNITS/ML injection    NovoLOG FLEXpen    15 mL    Inject under the skin 3 times daily before meals as directed by sliding scale.    Type 2 diabetes, HbA1c goal < 7% (H)       insulin pen needle 31G X 8 MM    B-D U/F    1 each    Use 3-5 times daily as directed.    Type 2 diabetes, HbA1c goal < 7% (H)       insulin syringe-needle U-100 30G X 1/2\" 0.5 ML    BD insulin syringe ULTRAFINE    100 each    Use daily as directed.        LANTUS VIAL 100 UNIT/ML injection   Generic drug:  insulin glargine      Inject 50 Units Subcutaneous 2 times daily.        lisinopril 20 MG tablet    PRINIVIL/ZESTRIL    180 tablet    TAKE 1 TABLET (20 MG) BY MOUTH 2 TIMES DAILY    Essential hypertension with goal blood pressure less than 140/90       metFORMIN 1000 MG tablet    GLUCOPHAGE    180 tablet    Take 1 tablet (1,000 mg) by mouth 2 times daily (with meals)    Type 2 diabetes mellitus without complication, with long-term current use of insulin (H)       metoprolol 50 MG 24 hr tablet    TOPROL-XL    90 tablet "    Take 1 tablet (50 mg) by mouth daily    Persistent atrial fibrillation (H)       mupirocin 2 % cream    BACTROBAN    30 g    Apply topically 2 times daily as needed    Open wound       order for DME      DREAMSTATION 5-15CM/H20 NASAL COMFORT BLUE        * order for DME     1 Device    Equipment being ordered: diabetic shoes    Type 2 diabetes, HbA1c goal < 7% (H)       * order for DME     2 Device    b knee high compression stockings - 15-20 mm Hg    Generalized edema       rivaroxaban ANTICOAGULANT 20 MG Tabs tablet    XARELTO    90 tablet    TAKE ONE TABLET BY MOUTH ONE TIME DAILY WITH DINNER    Atrial fibrillation, unspecified type (H)       rosuvastatin 20 MG tablet    CRESTOR    90 tablet    Take 1 tablet (20 mg) by mouth daily    Coronary artery calcification       Vitamin D3 5000 UNITS Chew      Take 1 capsule by mouth daily        * Notice:  This list has 2 medication(s) that are the same as other medications prescribed for you. Read the directions carefully, and ask your doctor or other care provider to review them with you.

## 2017-09-26 DIAGNOSIS — E11.9 TYPE 2 DIABETES MELLITUS WITHOUT COMPLICATION, WITH LONG-TERM CURRENT USE OF INSULIN (H): ICD-10-CM

## 2017-09-26 DIAGNOSIS — Z79.4 TYPE 2 DIABETES MELLITUS WITHOUT COMPLICATION, WITH LONG-TERM CURRENT USE OF INSULIN (H): ICD-10-CM

## 2017-09-27 NOTE — TELEPHONE ENCOUNTER
Pending Prescriptions:                       Disp   Refills    metFORMIN (GLUCOPHAGE) 1000 MG tablet     180 ta*1            Sig: Take 1 tablet (1,000 mg) by mouth 2 times daily           (with meals)             Last Written Prescription Date: 1/13/17  Last Fill Quantity: 180, # refills: 1  Last Office Visit with [facility], [facility] or [facility] prescribing provider:  7/13/17 Julio   Next 5 appointments (look out 90 days)     Oct 24, 2017  3:30 PM CDT   [facility]    [address] [facility] [address]   [phone]                   BP Readings from Last 3 Encounters:   07/13/17 131/64   05/17/17 131/66   03/22/17 127/66     Lab Results   Component Value Date    MICROL 92 09/23/2016     Lab Results   Component Value Date    UMALCR 152.57 09/23/2016     Creatinine   Date Value Ref Range Status   05/17/2017 1.11 0.66 - 1.25 mg/dL Final   ]  GFR Estimate   Date Value Ref Range Status   05/17/2017 66 >60 mL/min/1.7m2 Final     Comment:     Non  GFR Calc   12/12/2016 68 >OR=60 ml/min/1.73m2 Final   09/23/2016 65 >60 mL/min/1.7m2 Final     Comment:     Non  GFR Calc     GFR Estimate If Black   Date Value Ref Range Status   05/17/2017 80 >60 mL/min/1.7m2 Final     Comment:      GFR Calc   12/12/2016 79 >OR=60 ml/min/1.73m2 Final   09/23/2016 79 >60 mL/min/1.7m2 Final     Comment:      GFR Calc     Lab Results   Component Value Date    CHOL 130 01/13/2017     Lab Results   Component Value Date    HDL 31 01/13/2017     Lab Results   Component Value Date    LDL 75 01/13/2017     Lab Results   Component Value Date    TRIG 121 01/13/2017     Lab Results   Component Value Date    CHOLHDLRATIO 3.8 01/30/2015     Lab Results   Component Value Date    AST 28 05/17/2017     Lab Results   Component Value Date    ALT 30 05/17/2017     Lab Results   Component Value Date    A1C 6.8 05/17/2017    A1C  6.8 03/08/2017    A1C 7.4 12/12/2016    A1C 6.5 08/15/2016    A1C 6.7 07/21/2016     Potassium   Date Value Ref Range Status   05/17/2017 4.3 3.4 - 5.3 mmol/L Final     Conchis Vidal, RT(R)

## 2017-09-28 NOTE — TELEPHONE ENCOUNTER
Routing refill request to provider for review/approval because:  Last Rx from former PCP Dr Brown  Since then patient has established care with you  Please authorize if appropriate.  Thanks,  Trina GUO RN

## 2017-10-12 ENCOUNTER — TRANSFERRED RECORDS (OUTPATIENT)
Dept: HEALTH INFORMATION MANAGEMENT | Facility: CLINIC | Age: 67
End: 2017-10-12

## 2017-10-12 LAB
CHOLEST SERPL-MCNC: 126 MG/DL (ref 100–199)
HBA1C MFR BLD: 7.1 % (ref 4.8–5.6)
HDLC SERPL-MCNC: 31 MG/DL
LDLC SERPL CALC-MCNC: 77 MG/DL (ref 0–99)
TRIGL SERPL-MCNC: 91 MG/DL (ref 0–149)

## 2017-10-18 ENCOUNTER — OFFICE VISIT (OUTPATIENT)
Dept: PHARMACY | Facility: CLINIC | Age: 67
End: 2017-10-18
Payer: COMMERCIAL

## 2017-10-18 VITALS — SYSTOLIC BLOOD PRESSURE: 139 MMHG | WEIGHT: 262.8 LBS | DIASTOLIC BLOOD PRESSURE: 69 MMHG | BODY MASS INDEX: 38.81 KG/M2

## 2017-10-18 DIAGNOSIS — Z79.4 TYPE 2 DIABETES MELLITUS WITH DIABETIC NEPHROPATHY, WITH LONG-TERM CURRENT USE OF INSULIN (H): ICD-10-CM

## 2017-10-18 DIAGNOSIS — E11.21 TYPE 2 DIABETES MELLITUS WITH DIABETIC NEPHROPATHY, WITH LONG-TERM CURRENT USE OF INSULIN (H): ICD-10-CM

## 2017-10-18 DIAGNOSIS — E11.21 TYPE 2 DIABETES MELLITUS WITH DIABETIC NEPHROPATHY, WITH LONG-TERM CURRENT USE OF INSULIN (H): Primary | ICD-10-CM

## 2017-10-18 DIAGNOSIS — Z79.4 TYPE 2 DIABETES MELLITUS WITH DIABETIC NEPHROPATHY, WITH LONG-TERM CURRENT USE OF INSULIN (H): Primary | ICD-10-CM

## 2017-10-18 DIAGNOSIS — E78.5 HYPERLIPIDEMIA LDL GOAL <100: ICD-10-CM

## 2017-10-18 DIAGNOSIS — I10 ESSENTIAL HYPERTENSION WITH GOAL BLOOD PRESSURE LESS THAN 140/90: ICD-10-CM

## 2017-10-18 LAB — HBA1C MFR BLD: 7 % (ref 4.3–6)

## 2017-10-18 PROCEDURE — 83036 HEMOGLOBIN GLYCOSYLATED A1C: CPT | Performed by: INTERNAL MEDICINE

## 2017-10-18 PROCEDURE — 99606 MTMS BY PHARM EST 15 MIN: CPT | Performed by: PHARMACIST

## 2017-10-18 PROCEDURE — 36415 COLL VENOUS BLD VENIPUNCTURE: CPT | Performed by: INTERNAL MEDICINE

## 2017-10-18 PROCEDURE — 99607 MTMS BY PHARM ADDL 15 MIN: CPT | Performed by: PHARMACIST

## 2017-10-18 PROCEDURE — 82043 UR ALBUMIN QUANTITATIVE: CPT | Performed by: INTERNAL MEDICINE

## 2017-10-18 NOTE — PROGRESS NOTES
"SUBJECTIVE/OBJECTIVE:                Te Yoder is a 66 year old male coming in for a follow-up visit for Medication Therapy Management.  He was referred to me from Dr. Brown, has now established care with Dr. Kim.    Chief Complaint: Follow up from [facility] visit with Milan Schmitt on 7/10/17.  No concerns today.    Tobacco: No tobacco use  Alcohol: 1-3 beverages / week    Medication Adherence: no issues reported    Diabetes:  Pt currently taking Invokana 100mg daily, glipizide XL 10mg daily, Lantus 50 units BID, metformin 1000mg BID and Novolog sliding scale (once or twice a week, guesses 10-30 units). He is cautious about taking too much Novolog at night to prevent hypoglycemia and will use higher doses during the day. He has a hard time knowing how much Novolog to use depending on how much he eats. Pt reports that \"the jury is still out\" on the Novolog sliding scale regimen which Milan gave him at their last visit. Pt is not experiencing side effects. He is wondering about the risks of Invokana.  He continues to see Dr. Arnold for diabetes management.  SMBG: 3-4 times daily. Readings (patient reported): 383mg/dL last night 5 hours after eating rice and orange chicken. He took some Novolog (unable to tell me how much) and it went down to 137 this morning. Pt reports that this high reading is atypical for him, but unable to get more readings from him today.  Symptoms of low blood sugar? Sweaty, shaky. Frequency of hypoglycemia? Infrequent (couple times in last 3 months) - occurs at work in the morning. However, he doesn't measure when he feels low while working.  He grabs a pop or glucose tabs and keeps them on hand when he is outside.   Recent symptoms of high blood sugar? Neuropathy.  Eye exam: up to date  Foot exam: due  Microalbumin is not < 30 mg/g. Pt is taking an ACEi/ARB.  Aspirin: Not taking due to Xarelto use  Diet/Exercise: Goes to gym once a week.     Hypertension: Current medications include HCTZ " 12.5mg daily, lisionpril 20mg BID, and metoprolol XL 50mg daily.  Patient does not self-monitor BP.  He notices he has been going to the bathroom more frequently and is wondering if this is caused by HCTZ. Patient reports no other medication side effects.    Hyperlipidemia: Current therapy includes rosuvastatin 20mg. Patient denies muscle pains or other side effects. He recently got his blood drawn for a study about high triglycerides and diabetes.    Other medications/conditions were not discussed in detail due to time.    Current labs include:BP Readings from Last 3 Encounters:   07/13/17 131/64   05/17/17 131/66   03/22/17 127/66     Today's Vitals: /69  Wt 262 lb 12.8 oz (119.2 kg)  BMI 38.81 kg/m2  Lab Results   Component Value Date    A1C 6.8 05/17/2017   .  Lab Results   Component Value Date    CHOL 130 01/13/2017     Lab Results   Component Value Date    TRIG 121 01/13/2017     Lab Results   Component Value Date    HDL 31 01/13/2017     Lab Results   Component Value Date    LDL 75 01/13/2017       Liver Function Studies -   Recent Labs   Lab Test  05/17/17   1643   PROTTOTAL  7.4   ALBUMIN  3.9   BILITOTAL  0.5   ALKPHOS  69   AST  28   ALT  30       Lab Results   Component Value Date    UCRR 60 09/23/2016    MICROL 92 09/23/2016    UMALCR 152.57 (H) 09/23/2016       Last Basic Metabolic Panel:  Lab Results   Component Value Date     05/17/2017      Lab Results   Component Value Date    POTASSIUM 4.3 05/17/2017     Lab Results   Component Value Date    CHLORIDE 108 05/17/2017     Lab Results   Component Value Date    BUN 26 05/17/2017     Lab Results   Component Value Date    CR 1.11 05/17/2017     GFR Estimate   Date Value Ref Range Status   05/17/2017 66 >60 mL/min/1.7m2 Final     Comment:     Non  GFR Calc   12/12/2016 68 >OR=60 ml/min/1.73m2 Final   09/23/2016 65 >60 mL/min/1.7m2 Final     Comment:     Non  GFR Calc     TSH   Date Value Ref Range Status    01/29/2016 2.80 0.40 - 4.00 mU/L Final   ]    Most Recent Immunizations   Administered Date(s) Administered     Influenza (H1N1) 12/02/2009     Influenza (High Dose) 3 valent vaccine 09/12/2017     Influenza (IIV3) 09/20/2013     Influenza Vaccine IM 3yrs+ 4 Valent IIV4 10/27/2014     Mantoux 08/10/2016     Pneumococcal (PCV 13) 01/29/2016     Pneumococcal 23 valent 03/06/2017     TDAP Vaccine (Adacel) 07/16/2012     Zoster vaccine, live 09/06/2011       ASSESSMENT:              Current medications were reviewed today as discussed above.      Medication Adherence: no issues identified    Diabetes: Needs improvement. Patient is meeting A1c goal of < 8%. Pt due for A1c and microalbumin. The higher doses of Novolog put pt at risk for hypoglycemia and he would benefit from using the Novolog sliding scale as directed. Discussed the risk versus benefits for Invokana. In the CANVAS trial, Invokana was found to be associated with increased risk for amputations versus standard of care. Encouraged pt to discuss Invokana with his endocrinologist considering pt has some risk factors (neuroapthy, peripheral vascular disease).    Hypertension: Stable. Patient is meeting BP goal of < 140/90mmHg. It is unlikely that pt's increased urination is due to HCTZ or Invokana because he has been on these medications for years and increased urination is a new complaint. Discussed how increased urination could be due to prostate issues, but his yearly prostate exams and PSA levels have been normal. Also briefly discussed medication options and side effects of urinary incontinence.    Hyperlipidemia: Stable. Pt is on high intensity statin which is indicated based on 2013 ACC/AHA guidelines for lipid management. Reviewed his triglyceride levels which have been WNL.      PLAN:                  1. Encouraged pt to talk to Dr. Bustamante about Invokana.   2. Try to stick to your sliding scale schedule.    I spent 45 minutes with this patient  today. All changes were made via collaborative practice agreement with Delta Kim. A copy of the visit note was provided to the patient's primary care provider.     Will follow up in 3-6 months, sooner if needed.    The patient was given a summary of these recommendations as an after visit summary.    Tosin Gillette PharmD, [facility]  Medication Therapy Management Provider  Pager: [phone]    Rebecca Shields PharmD  Pharmaceutical Care Resident  Pager: [phone]

## 2017-10-18 NOTE — PATIENT INSTRUCTIONS
Recommendations from today's MTM visit:                                                    MTM (medication therapy management) is a service provided by a clinical pharmacist designed to help you get the most of out of your medicines.   Today we reviewed what your medicines are for, how to know if they are working, that your medicines are safe and how to make your medicine regimen as easy as possible.     1. Talk to Dr. Bustamante about Invokana.   2. Try to stick to your sliding scale schedule.  3. Labs today: A1c (3 month measure of your blood sugar) and microalbumin (to check your kidneys)    Next MTM visit: 3-6 months, sooner if needed    To schedule another MTM appointment, please call the clinic directly or you may call the MTM scheduling line at [phone] or toll-free at [phone].     My Clinical Pharmacist's contact information:                                                      It was a pleasure seeing you today!  Please feel free to contact me with any questions or concerns you have.      Tosin Gillette PharmD, [facility]  Medication Therapy Management Provider  Pager: [phone]    Rebecca Shields PharmD  Pharmaceutical Care Resident  Pager: [phone]    You may receive a survey about the MTM services you received.  I would appreciate your feedback to help me serve you better in the future. Please fill it out and return it when you can. Your comments will be anonymous.

## 2017-10-18 NOTE — MR AVS SNAPSHOT
After Visit Summary   10/18/2017    Te Yoder    MRN: [id number]           Patient Information     Date Of Birth          1950        Visit Information        Provider Department      10/18/2017 11:00 AM Tosin Gillette, [facility] MTM        Care Instructions    Recommendations from today's MTM visit:                                                    MTM (medication therapy management) is a service provided by a clinical pharmacist designed to help you get the most of out of your medicines.   Today we reviewed what your medicines are for, how to know if they are working, that your medicines are safe and how to make your medicine regimen as easy as possible.     1. Talk to Dr. Bustamante about Invokana.   2. Try to stick to your sliding scale schedule.  3. Labs today: A1c (3 month measure of your blood sugar) and microalbumin (to check your kidneys)    Next MTM visit: 3-6 months, sooner if needed    To schedule another MTM appointment, please call the clinic directly or you may call the MTM scheduling line at [phone] or toll-free at [phone].     My Clinical Pharmacist's contact information:                                                      It was a pleasure seeing you today!  Please feel free to contact me with any questions or concerns you have.      Tosin Gillette PharmD, [facility]  Medication Therapy Management Provider  Pager: [phone]    Rebecca Shields PharmD  Pharmaceutical Care Resident  Pager: [phone]    You may receive a survey about the MTM services you received.  I would appreciate your feedback to help me serve you better in the future. Please fill it out and return it when you can. Your comments will be anonymous.            Follow-ups after your visit        Your next 10 appointments already scheduled     Oct 24, 2017  3:30 PM CDT   [facility] Visit with Delta Kim MD   [facility]    [address]  [facility] [id number] [phone]           Bring a current list of meds and any records pertaining to this visit. For Physicals, please bring immunization records and any forms needing to be filled out. Please arrive 10 minutes early to complete paperwork.              Who to contact     If you have questions or need follow up information about today's clinic visit or your schedule please contact [facility] directly at [phone].  Normal or non-critical lab and imaging results will be communicated to you by [facility], letter or phone within 4 business days after the clinic has received the results. If you do not hear from us within 7 days, please contact the clinic through [employer] ([employer])t or phone. If you have a critical or abnormal lab result, we will notify you by phone as soon as possible.  Submit refill requests through [employer] or call your pharmacy and they will forward the refill request to us. Please allow 3 business days for your refill to be completed.          Additional Information About Your Visit        [facility] Information     6Scan gives you secure access to your electronic health record. If you see a primary care provider, you can also send messages to your care team and make appointments. If you have questions, please call your primary care clinic.  If you do not have a primary care provider, please call [phone] and they will assist you.        Care EveryWhere ID     This is your Care EveryWhere ID. This could be used by other organizations to access your [place] medical records  [id number]        Your Vitals Were     BMI (Body Mass Index)                   38.81 kg/m2            Blood Pressure from Last 3 Encounters:   10/18/17 139/69   07/13/17 131/64   05/17/17 131/66    Weight from Last 3 Encounters:   10/18/17 262 lb 12.8 oz (119.2 kg)   07/13/17 266 lb (120.7 kg)   05/17/17 266 lb (120.7 kg)              Today, you had the following     No orders found for display        Primary Care Provider Office Phone # Fax #    Delta Kim [phone] [address] [place] [address]        Equal Access to Services     LYNN LYLES : Hadii carol ku hadkaryno Sojaxali, waaxda luqadaha, qaybta kaalmada adeegyada, rubin nerin bradley high laGilmasharath dill. So [facility] [phone].    ATENCIÓN: Si habla español, tiene a lundberg disposición servicios gratuitos de asistencia lingüística. Llame al [phone].    We comply with applicable federal civil rights laws and [place] laws. We do not discriminate on the basis of race, color, national origin, age, disability, sex, sexual orientation, or gender identity.            Thank you!     Thank you for choosing [facility]  for your care. Our goal is always to provide you with excellent care. Hearing back from our patients is one way we can continue to improve our services. Please take a few minutes to complete the written survey that you may receive in the mail after your visit with us. Thank you!             Your Updated Medication List - Protect others around you: Learn how to safely use, store and throw away your medicines at www.disposemymeds.org.          This list is accurate as of: 10/18/17 11:51 AM.  Always use your most recent med list.                   Brand Name Dispense Instructions for use Diagnosis    ACCU-CHEK CLAUDIO      Test 3-4 times daily as directed        ammonium lactate 12 % cream    LAC-HYDRIN    385 g    Apply topically 2 times daily as needed for dry skin    Dry skin       ASPIRIN NOT PRESCRIBED    INTENTIONAL     Reported on 3/8/2017        canaliflozin tablet    INVOKANA     Take 100 mg by mouth every morning (before breakfast)        cyanocobalamin 1000 MCG tablet    vitamin  B-12     Take 2 tablets by mouth daily        glipiZIDE 10 MG 24 hr tablet    glipiZIDE XL    90 tablet    Take 1 tablet (10 mg) by mouth daily    Type 2 diabetes mellitus without complication,  "with long-term current use of insulin (H)       hydrochlorothiazide 12.5 MG Tabs tablet     90 tablet    Take 1 tablet (12.5 mg) by mouth daily    Persistent atrial fibrillation (H)       insulin aspart 100 UNITS/ML injection    NovoLOG FLEXpen    15 mL    Inject under the skin 3 times daily before meals as directed by sliding scale.    Type 2 diabetes, HbA1c goal < 7% (H)       insulin pen needle 31G X 8 MM    B-D U/F    1 each    Use 3-5 times daily as directed.    Type 2 diabetes, HbA1c goal < 7% (H)       insulin syringe-needle U-100 30G X 1/2\" 0.5 ML    BD insulin syringe ULTRAFINE    100 each    Use daily as directed.        LANTUS VIAL 100 UNIT/ML injection   Generic drug:  insulin glargine      Inject 50 Units Subcutaneous 2 times daily.        lisinopril 20 MG tablet    PRINIVIL/ZESTRIL    180 tablet    TAKE 1 TABLET (20 MG) BY MOUTH 2 TIMES DAILY    Essential hypertension with goal blood pressure less than 140/90       metFORMIN 1000 MG tablet    GLUCOPHAGE    180 tablet    Take 1 tablet (1,000 mg) by mouth 2 times daily (with meals)    Type 2 diabetes mellitus without complication, with long-term current use of insulin (H)       metoprolol 50 MG 24 hr tablet    TOPROL-XL    90 tablet    Take 1 tablet (50 mg) by mouth daily    Persistent atrial fibrillation (H)       mupirocin 2 % cream    BACTROBAN    30 g    Apply topically 2 times daily as needed    Open wound       order for DME      DREAMSTATION 5-15CM/H20 NASAL COMFORT BLUE        * order for DME     1 Device    Equipment being ordered: diabetic shoes    Type 2 diabetes, HbA1c goal < 7% (H)       * order for DME     2 Device    b knee high compression stockings - 15-20 mm Hg    Generalized edema       rivaroxaban ANTICOAGULANT 20 MG Tabs tablet    XARELTO    90 tablet    TAKE ONE TABLET BY MOUTH ONE TIME DAILY WITH DINNER    Atrial fibrillation, unspecified type (H)       rosuvastatin 20 MG tablet    CRESTOR    90 tablet    Take 1 tablet (20 mg) by " mouth daily    Coronary artery calcification       Vitamin D3 5000 UNITS Chew      Take 1 capsule by mouth daily        * Notice:  This list has 2 medication(s) that are the same as other medications prescribed for you. Read the directions carefully, and ask your doctor or other care provider to review them with you.

## 2017-10-19 LAB
CREAT UR-MCNC: 35 MG/DL
MICROALBUMIN UR-MCNC: 86 MG/L
MICROALBUMIN/CREAT UR: 242.09 MG/G CR (ref 0–17)

## 2017-10-24 ENCOUNTER — OFFICE VISIT (OUTPATIENT)
Dept: FAMILY MEDICINE | Facility: CLINIC | Age: 67
End: 2017-10-24
Payer: COMMERCIAL

## 2017-10-24 VITALS
OXYGEN SATURATION: 97 % | DIASTOLIC BLOOD PRESSURE: 60 MMHG | HEART RATE: 72 BPM | WEIGHT: 262 LBS | SYSTOLIC BLOOD PRESSURE: 139 MMHG | TEMPERATURE: 97.6 F | BODY MASS INDEX: 38.8 KG/M2 | HEIGHT: 69 IN

## 2017-10-24 DIAGNOSIS — I48.19 PERSISTENT ATRIAL FIBRILLATION (H): ICD-10-CM

## 2017-10-24 DIAGNOSIS — G62.9 PERIPHERAL POLYNEUROPATHY: ICD-10-CM

## 2017-10-24 DIAGNOSIS — N18.2 CKD (CHRONIC KIDNEY DISEASE) STAGE 2, GFR 60-89 ML/MIN: ICD-10-CM

## 2017-10-24 DIAGNOSIS — E78.5 HYPERLIPIDEMIA LDL GOAL <100: ICD-10-CM

## 2017-10-24 DIAGNOSIS — E53.8 B12 DEFICIENCY: ICD-10-CM

## 2017-10-24 DIAGNOSIS — E66.01 MORBID OBESITY (H): ICD-10-CM

## 2017-10-24 DIAGNOSIS — E08.21 DIABETES MELLITUS DUE TO UNDERLYING CONDITION WITH DIABETIC NEPHROPATHY, WITHOUT LONG-TERM CURRENT USE OF INSULIN (H): ICD-10-CM

## 2017-10-24 DIAGNOSIS — I10 ESSENTIAL HYPERTENSION: Primary | ICD-10-CM

## 2017-10-24 DIAGNOSIS — G47.33 OSA (OBSTRUCTIVE SLEEP APNEA): ICD-10-CM

## 2017-10-24 PROCEDURE — 99214 OFFICE O/P EST MOD 30 MIN: CPT | Performed by: INTERNAL MEDICINE

## 2017-10-24 NOTE — PROGRESS NOTES
SUBJECTIVE:   Te Yoder is a 67 year old male who presents to clinic today for the following health issues:    Diabetes Follow-up    Patient is checking blood sugars: four times daily.    Blood sugar testing frequency justification: Uncontrolled diabetes  Results are as follows:       am - 108       lunchtime - 200       suppertime - 180-200       bedtime - 150        Diabetic concerns: other - neuropathy      Symptoms of hypoglycemia (low blood sugar): none     Paresthesias (numbness or burning in feet) or sores: Yes numbness and tingling bilateral feet.     Date of last diabetic eye exam: Dec 2016    Hyperlipidemia Follow-Up      Rate your low fat/cholesterol diet?: fair    Taking statin?  Yes, no muscle aches from statin    Other lipid medications/supplements?:  none    Hypertension Follow-up      Outpatient blood pressures are not being checked.    Low Salt Diet: no added salt    Amount of exercise or physical activity: stays active in variety of ways. But, no specific exercise time set aside.     Problems taking medications regularly: No    Medication side effects: none    Diet: regular (no restrictions)    Patient has a history of diabetes and reports neuropathy symptoms in his feet.Currently, his feet are beginning to feet warm, a preceding factor  He notes his feet are currently beginning to feel warm and he will often wonder what is going on.      Tried stockings but stopped because they were hard to put on.  Problem list and histories reviewed & adjusted, as indicated.  Additional history: as documented    Current Outpatient Prescriptions   Medication Sig Dispense Refill     metFORMIN (GLUCOPHAGE) 1000 MG tablet Take 1 tablet (1,000 mg) by mouth 2 times daily (with meals) 180 tablet 3     rivaroxaban ANTICOAGULANT (XARELTO) 20 MG TABS tablet TAKE ONE TABLET BY MOUTH ONE TIME DAILY WITH DINNER 90 tablet 1     metoprolol (TOPROL-XL) 50 MG 24 hr tablet Take 1 tablet (50 mg) by mouth daily 90 tablet 1      "lisinopril (PRINIVIL/ZESTRIL) 20 MG tablet TAKE 1 TABLET (20 MG) BY MOUTH 2 TIMES DAILY 180 tablet 1     mupirocin (BACTROBAN) 2 % cream Apply topically 2 times daily as needed 30 g 1     ammonium lactate (LAC-HYDRIN) 12 % cream Apply topically 2 times daily as needed for dry skin 385 g 3     rosuvastatin (CRESTOR) 20 MG tablet Take 1 tablet (20 mg) by mouth daily 90 tablet 3     hydrochlorothiazide 12.5 MG TABS tablet Take 1 tablet (12.5 mg) by mouth daily 90 tablet 1     order for DME b knee high compression stockings - 15-20 mm Hg 2 Device 1     order for DME DREAMSTATION  5-15CM/H20  NASAL COMFORT BLUE       glipiZIDE (GLIPIZIDE XL) 10 MG 24 hr tablet Take 1 tablet (10 mg) by mouth daily 90 tablet 3     insulin syringe-needle U-100 (BD INSULIN SYRINGE ULTRAFINE) 30G X 1/2\" 0.5 ML Use daily as directed. 100 each prn     order for DME Equipment being ordered: diabetic shoes 1 Device 1     Cholecalciferol (VITAMIN D3) 5000 UNITS CHEW Take 1 capsule by mouth daily        ASPIRIN NOT PRESCRIBED, INTENTIONAL, Reported on 3/8/2017       insulin pen needle (B-D U/F) 31G X 8 MM Use 3-5 times daily as directed. 1 each PRN     canaliflozin (INVOKANA) tablet Take 100 mg by mouth every morning (before breakfast)       cyanocolbalamin (VITAMIN  B-12) 1000 MCG tablet Take 2 tablets by mouth daily        insulin aspart (NOVOLOG FLEXPEN) SOLN 100 UNIT/ML Inject under the skin 3 times daily before meals as directed by sliding scale. 15 mL 0     ACCU-CHEK CLAUDIO Test 3-4 times daily as directed       LANTUS 100 UNIT/ML SC SOLN Inject 50 Units Subcutaneous 2 times daily.       Allergies   Allergen Reactions     Simvastatin Other (See Comments)     Muscle aches, elevated CK levels       Reviewed and updated as needed this visit by clinical staffTobacco  Allergies  Meds  Soc Hx      Reviewed and updated as needed this visit by Provider       ROS:  Constitutional, HEENT, cardiovascular, pulmonary, gi and gu systems are negative, " "except as otherwise noted.    This document serves as a record of the services and decisions personally performed and made by Delta Kim MD. It was created on his/her behalf by Shasha Bergeron, a trained medical scribe. The creation of this document is based the provider's statements to the medical scribe.  Scribe Shasha Bergeron 4:14 PM, October 24, 2017     OBJECTIVE:   /60  Pulse 72  Temp 97.6  F (36.4  C) (Tympanic)  Ht 1.753 m (5' 9\")  Wt 118.8 kg (262 lb)  SpO2 97%  BMI 38.69 kg/m2  Body mass index is 38.69 kg/(m^2).   Neck was supple without adenopathy or thyromegaly his carotids were normal without bruits  Chest clear to auscultation and percussion  Cardiovascular S1 and S2 are physiologic without murmurs or gallops  Abdomen bowel sounds were normal.  There is no palpable mass or organomegaly. obese  Extremities nontender with 1-2+ pretibial edema, chronic venous stasis changes bilaterally  Pulses pedal pulses are as described otherwise his pulses are bilaterally symmetrical throughout without bruits  Skin without significant abnormality     ASSESSMENT/PLAN:   1. Essential hypertension  Begin hydralazine BID. Hypertension goal of 135/85. Return in 2 weeks for follow up labs.    2. Overweight BMI 40-45  To help with weight and lipids, advised increasing aerobic activity.     3. Hyperlipidemia LDL goal <100  Cholesterol is in adequate range. Triglycerides are good. HDL was borderline low; advised increasing aerobic activity.    4. CKD (chronic kidney disease) stage 2, GFR 60-89 ml/min  Increased proteins in urine since last test. Avoid use of NASIDs, if needed use Tylenol. Advised to decrease the amount of protein, including protein shakes. Continue lisinopril unchanged. Try to keep systolic blood pressure under 135.    5. ROC (obstructive sleep apnea)    6. Diabetes mellitus due to underlying condition with diabetic nephropathy, without long-term current use of insulin (H)  A1c increased " slightly since previous testing. With neuropathy, be sure to maintain level of exercises, including good balance and avoid undue friction that help you.    7. B12 deficiency    8. Persistent atrial fibrillation (H)    9. Peripheral polyneuropathy  See #6.     Except otherwise noted as above, all conditions are stable and medications are tolerated well. Continue related medications unchanged.     Total time face to face on the above problems was 26 minutes. More than 50% time spent counseling.     Delta Kim MD  [facility]    The information in this document, created by the medical scribe for me, accurately reflects the services I personally performed and the decisions made by me. I have reviewed and approved this document for accuracy prior to leaving the patient care area.  Delta Kim MD  4:02 PM, 10/24/17

## 2017-10-24 NOTE — MR AVS SNAPSHOT
After Visit Summary   10/24/2017    Te Yoder    MRN: [id number]           Patient Information     Date Of Birth          1950        Visit Information        Provider Department      10/24/2017 3:30 PM Delta Kim MD [facility]        Today's Diagnoses     Essential hypertension    -  1    Overweight BMI 40-45        Hyperlipidemia LDL goal <100        CKD (chronic kidney disease) stage 2, GFR 60-89 ml/min        ROC (obstructive sleep apnea)        Diabetes mellitus due to underlying condition with diabetic nephropathy, without long-term current use of insulin (H)        B12 deficiency        Persistent atrial fibrillation (H)        Peripheral polyneuropathy           Follow-ups after your visit        Your next 10 appointments already scheduled     Nov 15, 2017 10:30 AM CST   [facility] Visit with Delta Kim MD   [facility]    [address] [address] [phone]           Bring a current list of meds and any records pertaining to this visit. For Physicals, please bring immunization records and any forms needing to be filled out. Please arrive 10 minutes early to complete paperwork.              Who to contact     If you have questions or need follow up information about today's clinic visit or your schedule please contact [facility] directly at [phone].  Normal or non-critical lab and imaging results will be communicated to you by MyChart, letter or phone within 4 business days after the clinic has received the results. If you do not hear from us within 7 days, please contact the clinic through [employer] or phone. If you have a critical or abnormal lab result, we will notify you by phone as soon as possible.  Submit refill requests through [employer] or call your pharmacy and they will forward the refill request to us. Please allow 3 business days for your refill to be completed.          Additional  "Information About Your Visit        MyChart Information     [employer] gives you secure access to your electronic health record. If you see a primary care provider, you can also send messages to your care team and make appointments. If you have questions, please call your primary care clinic.  If you do not have a primary care provider, please call [phone] and they will assist you.        Care EveryWhere ID     This is your Care EveryWhere ID. This could be used by other organizations to access your [place] medical records  [id number]        Your Vitals Were     Pulse Temperature Height Pulse Oximetry BMI (Body Mass Index)       72 97.6  F (36.4  C) (Tympanic) 5' 9\" (1.753 m) 97% 38.69 kg/m2        Blood Pressure from Last 3 Encounters:   10/24/17 139/60   10/18/17 139/69   07/13/17 131/64    Weight from Last 3 Encounters:   10/24/17 262 lb (118.8 kg)   10/18/17 262 lb 12.8 oz (119.2 kg)   07/13/17 266 lb (120.7 kg)              Today, you had the following     No orders found for display       Primary Care Provider Office Phone # Fax #    Delta Kim [phone] [address] [address]        Equal Access to Services     [facility]: Hadii aad ku hadasho Soomaali, waaxda luqadaha, qaybta kaalmada adeegyada, waxay idiin hayaan adejuan high la'aatigre . So [facility] [phone].    ATENCIÓN: Si habla español, tiene a lundberg disposición servicios gratuitos de asistencia lingüística. Llame al [phone].    We comply with applicable federal civil rights laws and [place] laws. We do not discriminate on the basis of race, color, national origin, age, disability, sex, sexual orientation, or gender identity.            Thank you!     Thank you for choosing [facility]  for your care. Our goal is always to provide you with excellent care. Hearing back from our patients is one way we can continue to improve our services. Please take a few minutes to complete the " "written survey that you may receive in the mail after your visit with us. Thank you!             Your Updated Medication List - Protect others around you: Learn how to safely use, store and throw away your medicines at www.disposemymeds.org.          This list is accurate as of: 10/24/17 11:59 PM.  Always use your most recent med list.                   Brand Name Dispense Instructions for use Diagnosis    ACCU-CHEK CLAUDIO      Test 3-4 times daily as directed        ammonium lactate 12 % cream    LAC-HYDRIN    385 g    Apply topically 2 times daily as needed for dry skin    Dry skin       ASPIRIN NOT PRESCRIBED    INTENTIONAL     Reported on 3/8/2017        canaliflozin tablet    INVOKANA     Take 100 mg by mouth every morning (before breakfast)        cyanocobalamin 1000 MCG tablet    vitamin  B-12     Take 2 tablets by mouth daily        glipiZIDE 10 MG 24 hr tablet    glipiZIDE XL    90 tablet    Take 1 tablet (10 mg) by mouth daily    Type 2 diabetes mellitus without complication, with long-term current use of insulin (H)       hydrochlorothiazide 12.5 MG Tabs tablet     90 tablet    Take 1 tablet (12.5 mg) by mouth daily    Persistent atrial fibrillation (H)       insulin aspart 100 UNITS/ML injection    NovoLOG FLEXpen    15 mL    Inject under the skin 3 times daily before meals as directed by sliding scale.    Type 2 diabetes, HbA1c goal < 7% (H)       insulin pen needle 31G X 8 MM    B-D U/F    1 each    Use 3-5 times daily as directed.    Type 2 diabetes, HbA1c goal < 7% (H)       insulin syringe-needle U-100 30G X 1/2\" 0.5 ML    BD insulin syringe ULTRAFINE    100 each    Use daily as directed.        LANTUS VIAL 100 UNIT/ML injection   Generic drug:  insulin glargine      Inject 50 Units Subcutaneous 2 times daily.        lisinopril 20 MG tablet    PRINIVIL/ZESTRIL    180 tablet    TAKE 1 TABLET (20 MG) BY MOUTH 2 TIMES DAILY    Essential hypertension with goal blood pressure less than 140/90       " metFORMIN 1000 MG tablet    GLUCOPHAGE    180 tablet    Take 1 tablet (1,000 mg) by mouth 2 times daily (with meals)    Type 2 diabetes mellitus without complication, with long-term current use of insulin (H)       metoprolol 50 MG 24 hr tablet    TOPROL-XL    90 tablet    Take 1 tablet (50 mg) by mouth daily    Persistent atrial fibrillation (H)       mupirocin 2 % cream    BACTROBAN    30 g    Apply topically 2 times daily as needed    Open wound       order for DME      DREAMSTATION 5-15CM/H20 NASAL COMFORT BLUE        * order for DME     1 Device    Equipment being ordered: diabetic shoes    Type 2 diabetes, HbA1c goal < 7% (H)       * order for DME     2 Device    b knee high compression stockings - 15-20 mm Hg    Generalized edema       rivaroxaban ANTICOAGULANT 20 MG Tabs tablet    XARELTO    90 tablet    TAKE ONE TABLET BY MOUTH ONE TIME DAILY WITH DINNER    Atrial fibrillation, unspecified type (H)       rosuvastatin 20 MG tablet    CRESTOR    90 tablet    Take 1 tablet (20 mg) by mouth daily    Coronary artery calcification       Vitamin D3 5000 UNITS Chew      Take 1 capsule by mouth daily        * Notice:  This list has 2 medication(s) that are the same as other medications prescribed for you. Read the directions carefully, and ask your doctor or other care provider to review them with you.

## 2017-11-15 ENCOUNTER — OFFICE VISIT (OUTPATIENT)
Dept: FAMILY MEDICINE | Facility: CLINIC | Age: 67
End: 2017-11-15
Payer: COMMERCIAL

## 2017-11-15 VITALS
HEART RATE: 58 BPM | HEIGHT: 69 IN | WEIGHT: 260 LBS | BODY MASS INDEX: 38.51 KG/M2 | TEMPERATURE: 97.8 F | SYSTOLIC BLOOD PRESSURE: 109 MMHG | DIASTOLIC BLOOD PRESSURE: 57 MMHG | OXYGEN SATURATION: 94 %

## 2017-11-15 DIAGNOSIS — N18.2 CKD (CHRONIC KIDNEY DISEASE) STAGE 2, GFR 60-89 ML/MIN: ICD-10-CM

## 2017-11-15 DIAGNOSIS — E78.5 HYPERLIPIDEMIA LDL GOAL <100: ICD-10-CM

## 2017-11-15 DIAGNOSIS — E53.8 B12 DEFICIENCY: ICD-10-CM

## 2017-11-15 DIAGNOSIS — E08.21 DIABETES MELLITUS DUE TO UNDERLYING CONDITION WITH DIABETIC NEPHROPATHY, WITHOUT LONG-TERM CURRENT USE OF INSULIN (H): ICD-10-CM

## 2017-11-15 DIAGNOSIS — I48.19 PERSISTENT ATRIAL FIBRILLATION (H): ICD-10-CM

## 2017-11-15 DIAGNOSIS — I10 ESSENTIAL HYPERTENSION: Primary | ICD-10-CM

## 2017-11-15 DIAGNOSIS — M79.10 MYALGIA: ICD-10-CM

## 2017-11-15 PROCEDURE — 80048 BASIC METABOLIC PNL TOTAL CA: CPT | Performed by: INTERNAL MEDICINE

## 2017-11-15 PROCEDURE — 36415 COLL VENOUS BLD VENIPUNCTURE: CPT | Performed by: INTERNAL MEDICINE

## 2017-11-15 PROCEDURE — 99214 OFFICE O/P EST MOD 30 MIN: CPT | Performed by: INTERNAL MEDICINE

## 2017-11-15 PROCEDURE — 82550 ASSAY OF CK (CPK): CPT | Performed by: INTERNAL MEDICINE

## 2017-11-15 NOTE — PROGRESS NOTES
"  SUBJECTIVE:   Te Yoder is a 67 year old male who presents to clinic today for the following health issues:    Mr. Yoder has a history of hypertension and presents for follow up of change in medication. Patient does not monitor blood pressure at home. He has not noted a difference in ankle edema but is urinating more frequently. Of note, patient report moderate left shoulder pain which is reminiscent of arthralgias/myalgias related to Lipitor. Patient has been doing a small amount of raking but denies other repetitive activities.    Problem list and histories reviewed & adjusted, as indicated.  Additional history: as documented    Current Outpatient Prescriptions   Medication Sig Dispense Refill     metFORMIN (GLUCOPHAGE) 1000 MG tablet Take 1 tablet (1,000 mg) by mouth 2 times daily (with meals) 180 tablet 3     rivaroxaban ANTICOAGULANT (XARELTO) 20 MG TABS tablet TAKE ONE TABLET BY MOUTH ONE TIME DAILY WITH DINNER 90 tablet 1     metoprolol (TOPROL-XL) 50 MG 24 hr tablet Take 1 tablet (50 mg) by mouth daily 90 tablet 1     lisinopril (PRINIVIL/ZESTRIL) 20 MG tablet TAKE 1 TABLET (20 MG) BY MOUTH 2 TIMES DAILY 180 tablet 1     mupirocin (BACTROBAN) 2 % cream Apply topically 2 times daily as needed 30 g 1     ammonium lactate (LAC-HYDRIN) 12 % cream Apply topically 2 times daily as needed for dry skin 385 g 3     rosuvastatin (CRESTOR) 20 MG tablet Take 1 tablet (20 mg) by mouth daily 90 tablet 3     hydrochlorothiazide 12.5 MG TABS tablet Take 1 tablet (12.5 mg) by mouth daily 90 tablet 1     order for DME b knee high compression stockings - 15-20 mm Hg 2 Device 1     order for DME DREAMSTATION  5-15CM/H20  NASAL COMFORT BLUE       glipiZIDE (GLIPIZIDE XL) 10 MG 24 hr tablet Take 1 tablet (10 mg) by mouth daily 90 tablet 3     insulin syringe-needle U-100 (BD INSULIN SYRINGE ULTRAFINE) 30G X 1/2\" 0.5 ML Use daily as directed. 100 each prn     order for DME Equipment being ordered: diabetic shoes 1 Device 1 " "    Cholecalciferol (VITAMIN D3) 5000 UNITS CHEW Take 1 capsule by mouth daily        ASPIRIN NOT PRESCRIBED, INTENTIONAL, Reported on 3/8/2017       insulin pen needle (B-D U/F) 31G X 8 MM Use 3-5 times daily as directed. 1 each PRN     canaliflozin (INVOKANA) tablet Take 100 mg by mouth every morning (before breakfast)       cyanocolbalamin (VITAMIN  B-12) 1000 MCG tablet Take 2 tablets by mouth daily        insulin aspart (NOVOLOG FLEXPEN) SOLN 100 UNIT/ML Inject under the skin 3 times daily before meals as directed by sliding scale. 15 mL 0     ACCU-CHEK CLAUDIO Test 3-4 times daily as directed       LANTUS 100 UNIT/ML SC SOLN Inject 50 Units Subcutaneous 2 times daily.       Allergies   Allergen Reactions     Simvastatin Other (See Comments)     Muscle aches, elevated CK levels       Reviewed and updated as needed this visit by clinical staff  Tobacco  Allergies  Meds  Problems  Med Hx  Surg Hx  Fam Hx  Soc Hx        Reviewed and updated as needed this visit by Provider         ROS:  Constitutional, HEENT, cardiovascular, pulmonary, gi and gu systems are negative, except as otherwise noted.    This document serves as a record of the services and decisions personally performed and made by Delta Kim MD. It was created on his/her behalf by Shasha Bergeron, a trained medical scribe. The creation of this document is based the provider's statements to the medical scribe.  Elizabeth Bergeron 11:22 AM, November 15, 2017     OBJECTIVE:   /57 (BP Location: Left arm, Cuff Size: Adult Large)  Pulse 58  Temp 97.8  F (36.6  C) (Tympanic)  Ht 1.753 m (5' 9\")  Wt 117.9 kg (260 lb)  SpO2 94%  BMI 38.4 kg/m2  Body mass index is 38.4 kg/(m^2).  Neck was supple without adenopathy or thyromegaly his carotids were normal without bruits  Chest clear to auscultation and percussion  Cardiovascular S1 and S2 are physiologic without murmurs or gallops  Abdomen bowel sounds were normal.  There is no palpable " mass or organomegaly  Extremities nontender with trace-1+ pretibail edema. Chronic venous stasis changes  Left shoulder pain with abduction, internal rotation and external rotation. Has limitation of external rotation to 130 degrees   Pulses pedal pulses are as described otherwise his pulses are bilaterally symmetrical throughout without bruits  Skin without significant abnormality     ASSESSMENT/PLAN:   1. Essential hypertension  Under great control. Continue medication unchanged      2. Diabetes mellitus due to underlying condition with diabetic nephropathy, without long-term current use of insulin (H)    3. Hyperlipidemia LDL goal <100    4. CKD (chronic kidney disease) stage 2, GFR 60-89 ml/min  - Basic metabolic panel    5. B12 deficiency    6. Persistent atrial fibrillation (H)  quiescent    7. Myalgia  At his request we are making sure achiness is not related to medications   - CK total    Follow up for hypertension in January. Follow up with Albumin in Spring.     Delta Kim MD  [facility]    The information in this document, created by the medical scribe for me, accurately reflects the services I personally performed and the decisions made by me. I have reviewed and approved this document for accuracy prior to leaving the patient care area.  Delta Kim MD  11:22 AM, 11/15/17

## 2017-11-15 NOTE — MR AVS SNAPSHOT
After Visit Summary   11/15/2017    Te Yoder    MRN: [id number]           Patient Information     Date Of Birth          1950        Visit Information        Provider Department      11/15/2017 10:30 AM Delta Kim MD [facility]        Today's Diagnoses     Essential hypertension    -  1    Diabetes mellitus due to underlying condition with diabetic nephropathy, without long-term current use of insulin (H)        Hyperlipidemia LDL goal <100        CKD (chronic kidney disease) stage 2, GFR 60-89 ml/min        B12 deficiency        Persistent atrial fibrillation (H)        Myalgia           Follow-ups after your visit        Your next 10 appointments already scheduled     Dec 28, 2017 12:00 PM CST   Lab with  LAB   [facility] ([facility])    [address] [place] [address]  [address] [facility] [address] [phone]            Dec 28, 2017  1:00 PM CST   (Arrive by 12:30 PM)   New Patient Visit with Brooklyn Rao MD   [facility] Nephrology ([facility])    [address] [place] [address]  [address] [facility] [address] [phone]              Who to contact     If you have questions or need follow up information about today's clinic visit or your schedule please contact [facility] directly at [phone].  Normal or non-critical lab and imaging results will be communicated to you by MyChart, letter or phone within 4 business days after the clinic has received the results. If you do not hear from us within 7 days, please contact the clinic through MyChart or phone. If you have a critical or abnormal lab result, we will notify you by phone as soon as possible.  Submit refill requests through [employer] or call your pharmacy and they will forward the refill request to us. Please allow 3 business days for your refill to be completed.          Additional Information About Your Visit        MyChart Information     [employer]  "gives you secure access to your electronic health record. If you see a primary care provider, you can also send messages to your care team and make appointments. If you have questions, please call your primary care clinic.  If you do not have a primary care provider, please call [phone] and they will assist you.        Care EveryWhere ID     This is your Care EveryWhere ID. This could be used by other organizations to access your [place] medical records  [id number]        Your Vitals Were     Pulse Temperature Height Pulse Oximetry BMI (Body Mass Index)       58 97.8  F (36.6  C) (Tympanic) 5' 9\" (1.753 m) 94% 38.4 kg/m2        Blood Pressure from Last 3 Encounters:   11/15/17 109/57   10/24/17 139/60   10/18/17 139/69    Weight from Last 3 Encounters:   11/15/17 260 lb (117.9 kg)   10/24/17 262 lb (118.8 kg)   10/18/17 262 lb 12.8 oz (119.2 kg)              We Performed the Following     Basic metabolic panel     CK total        Primary Care Provider Office Phone # Fax #    Delta Kim [phone] [address] [facility] [address]  [facility] [address]        Equal Access to Services     [facility] : Hadii aad ku hadasho Soomaali, waaxda luqadaha, qaybta kaalmada adeegyada, waxay idiin haycyndin bradley high laaria . So [facility] [phone].    ATENCIÓN: Si habla español, tiene a lundberg disposición servicios gratuitos de asistencia lingüística. Llame al [phone].    We comply with applicable federal civil rights laws and [place] laws. We do not discriminate on the basis of race, color, national origin, age, disability, sex, sexual orientation, or gender identity.            Thank you!     Thank you for choosing [facility]  for your care. Our goal is always to provide you with excellent care. Hearing back from our patients is one way we can continue to improve our services. Please take a few minutes to complete the written survey that you may receive in the mail after your visit " "with us. Thank you!             Your Updated Medication List - Protect others around you: Learn how to safely use, store and throw away your medicines at www.disposemymeds.org.          This list is accurate as of: 11/15/17 11:59 PM.  Always use your most recent med list.                   Brand Name Dispense Instructions for use Diagnosis    ACCU-CHEK CLAUDIO      Test 3-4 times daily as directed        ammonium lactate 12 % cream    LAC-HYDRIN    385 g    Apply topically 2 times daily as needed for dry skin    Dry skin       ASPIRIN NOT PRESCRIBED    INTENTIONAL     Reported on 3/8/2017        canaliflozin tablet    INVOKANA     Take 100 mg by mouth every morning (before breakfast)        cyanocobalamin 1000 MCG tablet    vitamin  B-12     Take 2 tablets by mouth daily        glipiZIDE 10 MG 24 hr tablet    glipiZIDE XL    90 tablet    Take 1 tablet (10 mg) by mouth daily    Type 2 diabetes mellitus without complication, with long-term current use of insulin (H)       hydrochlorothiazide 12.5 MG Tabs tablet     90 tablet    Take 1 tablet (12.5 mg) by mouth daily    Persistent atrial fibrillation (H)       insulin aspart 100 UNITS/ML injection    NovoLOG FLEXpen    15 mL    Inject under the skin 3 times daily before meals as directed by sliding scale.    Type 2 diabetes, HbA1c goal < 7% (H)       insulin pen needle 31G X 8 MM    B-D U/F    1 each    Use 3-5 times daily as directed.    Type 2 diabetes, HbA1c goal < 7% (H)       insulin syringe-needle U-100 30G X 1/2\" 0.5 ML    BD insulin syringe ULTRAFINE    100 each    Use daily as directed.        LANTUS VIAL 100 UNIT/ML injection   Generic drug:  insulin glargine      Inject 50 Units Subcutaneous 2 times daily.        lisinopril 20 MG tablet    PRINIVIL/ZESTRIL    180 tablet    TAKE 1 TABLET (20 MG) BY MOUTH 2 TIMES DAILY    Essential hypertension with goal blood pressure less than 140/90       metFORMIN 1000 MG tablet    GLUCOPHAGE    180 tablet    Take 1 tablet " (1,000 mg) by mouth 2 times daily (with meals)    Type 2 diabetes mellitus without complication, with long-term current use of insulin (H)       metoprolol 50 MG 24 hr tablet    TOPROL-XL    90 tablet    Take 1 tablet (50 mg) by mouth daily    Persistent atrial fibrillation (H)       mupirocin 2 % cream    BACTROBAN    30 g    Apply topically 2 times daily as needed    Open wound       order for DME      DREAMSTATION 5-15CM/H20 NASAL COMFORT BLUE        * order for DME     1 Device    Equipment being ordered: diabetic shoes    Type 2 diabetes, HbA1c goal < 7% (H)       * order for DME     2 Device    b knee high compression stockings - 15-20 mm Hg    Generalized edema       rivaroxaban ANTICOAGULANT 20 MG Tabs tablet    XARELTO    90 tablet    TAKE ONE TABLET BY MOUTH ONE TIME DAILY WITH DINNER    Atrial fibrillation, unspecified type (H)       rosuvastatin 20 MG tablet    CRESTOR    90 tablet    Take 1 tablet (20 mg) by mouth daily    Coronary artery calcification       Vitamin D3 5000 UNITS Chew      Take 1 capsule by mouth daily        * Notice:  This list has 2 medication(s) that are the same as other medications prescribed for you. Read the directions carefully, and ask your doctor or other care provider to review them with you.

## 2017-11-15 NOTE — NURSING NOTE
"Chief Complaint   Patient presents with     RECHECK       Initial /57 (BP Location: Left arm, Cuff Size: Adult Large)  Pulse 58  Temp 97.8  F (36.6  C) (Tympanic)  Ht 5' 9\" (1.753 m)  Wt 260 lb (117.9 kg)  SpO2 94%  BMI 38.4 kg/m2 Estimated body mass index is 38.4 kg/(m^2) as calculated from the following:    Height as of this encounter: 5' 9\" (1.753 m).    Weight as of this encounter: 260 lb (117.9 kg).  Medication Reconciliation: complete     Ruby Cuenca MA    "

## 2017-11-16 LAB
ANION GAP SERPL CALCULATED.3IONS-SCNC: 4 MMOL/L (ref 3–14)
BUN SERPL-MCNC: 27 MG/DL (ref 7–30)
CALCIUM SERPL-MCNC: 9.5 MG/DL (ref 8.5–10.1)
CHLORIDE SERPL-SCNC: 106 MMOL/L (ref 94–109)
CK SERPL-CCNC: 343 U/L (ref 30–300)
CO2 SERPL-SCNC: 30 MMOL/L (ref 20–32)
CREAT SERPL-MCNC: 1.13 MG/DL (ref 0.66–1.25)
GFR SERPL CREATININE-BSD FRML MDRD: 65 ML/MIN/1.7M2
GLUCOSE SERPL-MCNC: 56 MG/DL (ref 70–99)
POTASSIUM SERPL-SCNC: 3.8 MMOL/L (ref 3.4–5.3)
SODIUM SERPL-SCNC: 140 MMOL/L (ref 133–144)

## 2017-11-21 ENCOUNTER — TELEPHONE (OUTPATIENT)
Dept: FAMILY MEDICINE | Facility: CLINIC | Age: 67
End: 2017-11-21

## 2017-11-21 NOTE — TELEPHONE ENCOUNTER
Called Te back.  He found out his insurance formulary is changing from Lantus to Basaglar.  He wonders what my thoughts are about this.  I advised this would be fine and a 1:1 conversion.  He'll schedule an appt with endocrinology.    Tosin Gillette, PharmD, [facility]  Medication Therapy Management Provider  Pager: [phone]

## 2017-11-21 NOTE — TELEPHONE ENCOUNTER
Reason for Call:  Medication Question      Other request: patient has questions about his diabetic medication: LANTUS 100 UNIT/ML SC SOLN.  He would like Tosin to call him at her convenience.    Can we leave a detailed message on this number? YES    Phone number patient can be reached at: Home number on file [phone] (home)    Best Time: anytime    Call taken on 11/21/2017 at 1:51 PM by Danita Kirkland    .

## 2017-11-28 DIAGNOSIS — N20.0 CALCULUS OF KIDNEY: Primary | ICD-10-CM

## 2017-12-01 DIAGNOSIS — R80.9 PROTEINURIA: ICD-10-CM

## 2017-12-01 DIAGNOSIS — E55.9 VITAMIN D DEFICIENCY: ICD-10-CM

## 2017-12-01 DIAGNOSIS — N18.2 CKD (CHRONIC KIDNEY DISEASE) STAGE 2, GFR 60-89 ML/MIN: Primary | ICD-10-CM

## 2017-12-01 DIAGNOSIS — D63.1 ANEMIA IN CHRONIC KIDNEY DISEASE (CODE): ICD-10-CM

## 2017-12-28 ENCOUNTER — OFFICE VISIT (OUTPATIENT)
Dept: NEPHROLOGY | Facility: CLINIC | Age: 67
End: 2017-12-28
Attending: INTERNAL MEDICINE
Payer: MEDICARE

## 2017-12-28 VITALS
BODY MASS INDEX: 38.51 KG/M2 | DIASTOLIC BLOOD PRESSURE: 76 MMHG | HEART RATE: 75 BPM | WEIGHT: 260 LBS | HEIGHT: 69 IN | OXYGEN SATURATION: 99 % | SYSTOLIC BLOOD PRESSURE: 139 MMHG | TEMPERATURE: 98.2 F

## 2017-12-28 DIAGNOSIS — E55.9 VITAMIN D DEFICIENCY: ICD-10-CM

## 2017-12-28 DIAGNOSIS — R80.9 PROTEINURIA: ICD-10-CM

## 2017-12-28 DIAGNOSIS — N18.30 CKD (CHRONIC KIDNEY DISEASE) STAGE 3, GFR 30-59 ML/MIN (H): ICD-10-CM

## 2017-12-28 DIAGNOSIS — E11.21 TYPE 2 DIABETES MELLITUS WITH DIABETIC NEPHROPATHY, UNSPECIFIED LONG TERM INSULIN USE STATUS: ICD-10-CM

## 2017-12-28 DIAGNOSIS — N18.2 CKD (CHRONIC KIDNEY DISEASE) STAGE 2, GFR 60-89 ML/MIN: ICD-10-CM

## 2017-12-28 DIAGNOSIS — D63.1 ANEMIA IN CHRONIC KIDNEY DISEASE (CODE): ICD-10-CM

## 2017-12-28 DIAGNOSIS — I10 ESSENTIAL HYPERTENSION: Primary | ICD-10-CM

## 2017-12-28 LAB
ALBUMIN SERPL-MCNC: 3.8 G/DL (ref 3.4–5)
ALBUMIN UR-MCNC: NEGATIVE MG/DL
ANION GAP SERPL CALCULATED.3IONS-SCNC: 6 MMOL/L (ref 3–14)
APPEARANCE UR: CLEAR
BILIRUB UR QL STRIP: NEGATIVE
BUN SERPL-MCNC: 32 MG/DL (ref 7–30)
CALCIUM SERPL-MCNC: 9.1 MG/DL (ref 8.5–10.1)
CHLORIDE SERPL-SCNC: 105 MMOL/L (ref 94–109)
CO2 SERPL-SCNC: 30 MMOL/L (ref 20–32)
COLOR UR AUTO: YELLOW
CREAT SERPL-MCNC: 1.29 MG/DL (ref 0.66–1.25)
CREAT UR-MCNC: 94 MG/DL
DEPRECATED CALCIDIOL+CALCIFEROL SERPL-MC: 69 UG/L (ref 20–75)
ERYTHROCYTE [DISTWIDTH] IN BLOOD BY AUTOMATED COUNT: 13.5 % (ref 10–15)
FERRITIN SERPL-MCNC: 129 NG/ML (ref 26–388)
GFR SERPL CREATININE-BSD FRML MDRD: 56 ML/MIN/1.7M2
GLUCOSE SERPL-MCNC: 142 MG/DL (ref 70–99)
GLUCOSE UR STRIP-MCNC: >499 MG/DL
HCT VFR BLD AUTO: 46.4 % (ref 40–53)
HGB BLD-MCNC: 14.6 G/DL (ref 13.3–17.7)
HGB UR QL STRIP: ABNORMAL
IRON SATN MFR SERPL: 31 % (ref 15–46)
IRON SERPL-MCNC: 93 UG/DL (ref 35–180)
KETONES UR STRIP-MCNC: NEGATIVE MG/DL
LEUKOCYTE ESTERASE UR QL STRIP: NEGATIVE
MCH RBC QN AUTO: 29.3 PG (ref 26.5–33)
MCHC RBC AUTO-ENTMCNC: 31.5 G/DL (ref 31.5–36.5)
MCV RBC AUTO: 93 FL (ref 78–100)
MICROALBUMIN UR-MCNC: 39 MG/L
MICROALBUMIN/CREAT UR: 41.34 MG/G CR (ref 0–17)
MUCOUS THREADS #/AREA URNS LPF: PRESENT /LPF
NITRATE UR QL: NEGATIVE
PH UR STRIP: 5 PH (ref 5–7)
PHOSPHATE SERPL-MCNC: 3.1 MG/DL (ref 2.5–4.5)
PLATELET # BLD AUTO: 158 10E9/L (ref 150–450)
POTASSIUM SERPL-SCNC: 4.2 MMOL/L (ref 3.4–5.3)
PROT UR-MCNC: 0.16 G/L
PROT/CREAT 24H UR: 0.17 G/G CR (ref 0–0.2)
PTH-INTACT SERPL-MCNC: 43 PG/ML (ref 12–72)
RBC # BLD AUTO: 4.98 10E12/L (ref 4.4–5.9)
RBC #/AREA URNS AUTO: 1 /HPF (ref 0–2)
SODIUM SERPL-SCNC: 140 MMOL/L (ref 133–144)
SOURCE: ABNORMAL
SP GR UR STRIP: 1.01 (ref 1–1.03)
SQUAMOUS #/AREA URNS AUTO: <1 /HPF (ref 0–1)
TIBC SERPL-MCNC: 303 UG/DL (ref 240–430)
UROBILINOGEN UR STRIP-MCNC: 0 MG/DL (ref 0–2)
WBC # BLD AUTO: 6.6 10E9/L (ref 4–11)
WBC #/AREA URNS AUTO: 0 /HPF (ref 0–2)

## 2017-12-28 PROCEDURE — 99212 OFFICE O/P EST SF 10 MIN: CPT | Mod: ZF

## 2017-12-28 PROCEDURE — 82728 ASSAY OF FERRITIN: CPT | Performed by: INTERNAL MEDICINE

## 2017-12-28 PROCEDURE — 85027 COMPLETE CBC AUTOMATED: CPT | Performed by: INTERNAL MEDICINE

## 2017-12-28 PROCEDURE — 83540 ASSAY OF IRON: CPT | Performed by: INTERNAL MEDICINE

## 2017-12-28 PROCEDURE — 83970 ASSAY OF PARATHORMONE: CPT | Performed by: INTERNAL MEDICINE

## 2017-12-28 PROCEDURE — 82306 VITAMIN D 25 HYDROXY: CPT | Performed by: INTERNAL MEDICINE

## 2017-12-28 PROCEDURE — 80069 RENAL FUNCTION PANEL: CPT | Performed by: INTERNAL MEDICINE

## 2017-12-28 PROCEDURE — 84156 ASSAY OF PROTEIN URINE: CPT | Performed by: INTERNAL MEDICINE

## 2017-12-28 PROCEDURE — 36415 COLL VENOUS BLD VENIPUNCTURE: CPT | Performed by: INTERNAL MEDICINE

## 2017-12-28 PROCEDURE — 82043 UR ALBUMIN QUANTITATIVE: CPT | Performed by: INTERNAL MEDICINE

## 2017-12-28 PROCEDURE — 81001 URINALYSIS AUTO W/SCOPE: CPT | Performed by: INTERNAL MEDICINE

## 2017-12-28 PROCEDURE — 83550 IRON BINDING TEST: CPT | Performed by: INTERNAL MEDICINE

## 2017-12-28 ASSESSMENT — PAIN SCALES - GENERAL: PAINLEVEL: NO PAIN (0)

## 2017-12-28 NOTE — MR AVS SNAPSHOT
After Visit Summary   12/28/2017    Te Yoder    MRN: [id number]           Patient Information     Date Of Birth          1950        Visit Information        Provider Department      12/28/2017 1:00 PM Brooklyn Rao MD [facility]        Today's Diagnoses     Essential hypertension    -  1    CKD (chronic kidney disease) stage 3, GFR 30-59 ml/min        Type 2 diabetes mellitus with diabetic nephropathy, unspecified long term insulin use status (H)           Follow-ups after your visit        Follow-up notes from your care team     Return if symptoms worsen or fail to improve.      Your next 10 appointments already scheduled     Jan 24, 2018  1:00 PM CST   XR KUB with SHXR3   [facility] ([facility])    [address] [address] [phone]           Please bring a list of your current medicines to your exam. (Include vitamins, minerals and over-thecounter medicines.) Leave your valuables at home.  Tell your doctor if there is a chance you may be pregnant.  You do not need to do anything special for this exam.            Jan 24, 2018  2:10 PM CST   Return Visit with Marques Beach MD   [facility] ([facility])    [address]  [address]  [facility] [address] [phone]              Who to contact     If you have questions or need follow up information about today's clinic visit or your schedule please contact [facility] directly at [phone].  Normal or non-critical lab and imaging results will be communicated to you by MyChart, letter or phone within 4 business days after the clinic has received the results. If you do not hear from us within 7 days, please contact the clinic through MyChart or phone. If you have a critical or abnormal lab result, we will notify you by phone as soon as possible.  Submit refill requests through [employer] or call your  "pharmacy and they will forward the refill request to us. Please allow 3 business days for your refill to be completed.          Additional Information About Your Visit        [employer] Information     [employer] gives you secure access to your electronic health record. If you see a primary care provider, you can also send messages to your care team and make appointments. If you have questions, please call your primary care clinic.  If you do not have a primary care provider, please call [phone] and they will assist you.        Care EveryWhere ID     This is your Care EveryWhere ID. This could be used by other organizations to access your [facility] medical records  [id number]        Your Vitals Were     Pulse Temperature Height Pulse Oximetry BMI (Body Mass Index)       75 98.2  F (36.8  C) (Oral) 1.753 m (5' 9\") 99% 38.4 kg/m2        Blood Pressure from Last 3 Encounters:   12/28/17 139/76   11/15/17 109/57   10/24/17 139/60    Weight from Last 3 Encounters:   12/28/17 117.9 kg (260 lb)   11/15/17 117.9 kg (260 lb)   10/24/17 118.8 kg (262 lb)              Today, you had the following     No orders found for display         Today's Medication Changes          These changes are accurate as of: 12/28/17  3:17 PM.  If you have any questions, ask your nurse or doctor.               These medicines have changed or have updated prescriptions.        Dose/Directions    hydrochlorothiazide 12.5 MG Tabs tablet   This may have changed:  how much to take   Used for:  Persistent atrial fibrillation (H)        Dose:  12.5 mg   Take 1 tablet (12.5 mg) by mouth daily   Quantity:  90 tablet   Refills:  1                Primary Care Provider Office Phone # Fax #    Delta Kim [phone] [address] [place] [address] [place] [address]        Equal Access to Services     LYNN LYLES AH: Linda munroeo Davy, waaxda luqadaha, qaybta kaalmada nelia, rubin dill. So " [facility] [phone].    ATENCIÓN: Si crystal riggins, tiene a lundberg disposición servicios gratuitos de asistencia lingüística. Shaheen trevino [phone].    We comply with applicable federal civil rights laws and [place] laws. We do not discriminate on the basis of race, color, national origin, age, disability, sex, sexual orientation, or gender identity.            Thank you!     Thank you for choosing [facility]  for your care. Our goal is always to provide you with excellent care. Hearing back from our patients is one way we can continue to improve our services. Please take a few minutes to complete the written survey that you may receive in the mail after your visit with us. Thank you!             Your Updated Medication List - Protect others around you: Learn how to safely use, store and throw away your medicines at www.disposemymeds.org.          This list is accurate as of: 12/28/17  3:17 PM.  Always use your most recent med list.                   Brand Name Dispense Instructions for use Diagnosis    ACCU-CHEK CLAUDIO      Test 3-4 times daily as directed        ammonium lactate 12 % cream    LAC-HYDRIN    385 g    Apply topically 2 times daily as needed for dry skin    Dry skin       ASPIRIN NOT PRESCRIBED    INTENTIONAL     Reported on 3/8/2017        canaliflozin tablet    INVOKANA     Take 100 mg by mouth every morning (before breakfast)        cyanocobalamin 1000 MCG tablet    vitamin  B-12     Take 2 tablets by mouth daily        glipiZIDE 10 MG 24 hr tablet    glipiZIDE XL    90 tablet    Take 1 tablet (10 mg) by mouth daily    Type 2 diabetes mellitus without complication, with long-term current use of insulin (H)       hydrochlorothiazide 12.5 MG Tabs tablet     90 tablet    Take 1 tablet (12.5 mg) by mouth daily    Persistent atrial fibrillation (H)       insulin aspart 100 UNITS/ML injection    NovoLOG FLEXpen    15 mL    Inject under the skin 3 times daily before meals as directed by sliding scale.  "   Type 2 diabetes, HbA1c goal < 7% (H)       insulin pen needle 31G X 8 MM    B-D U/F    1 each    Use 3-5 times daily as directed.    Type 2 diabetes, HbA1c goal < 7% (H)       insulin syringe-needle U-100 30G X 1/2\" 0.5 ML    BD insulin syringe ULTRAFINE    100 each    Use daily as directed.        LANTUS VIAL 100 UNIT/ML injection   Generic drug:  insulin glargine      Inject 50 Units Subcutaneous 2 times daily.        lisinopril 20 MG tablet    PRINIVIL/ZESTRIL    180 tablet    TAKE 1 TABLET (20 MG) BY MOUTH 2 TIMES DAILY    Essential hypertension with goal blood pressure less than 140/90       metFORMIN 1000 MG tablet    GLUCOPHAGE    180 tablet    Take 1 tablet (1,000 mg) by mouth 2 times daily (with meals)    Type 2 diabetes mellitus without complication, with long-term current use of insulin (H)       metoprolol 50 MG 24 hr tablet    TOPROL-XL    90 tablet    Take 1 tablet (50 mg) by mouth daily    Persistent atrial fibrillation (H)       mupirocin 2 % cream    BACTROBAN    30 g    Apply topically 2 times daily as needed    Open wound       order for DME      DREAMSTATION 5-15CM/H20 NASAL COMFORT BLUE        * order for DME     1 Device    Equipment being ordered: diabetic shoes    Type 2 diabetes, HbA1c goal < 7% (H)       * order for DME     2 Device    b knee high compression stockings - 15-20 mm Hg    Generalized edema       rivaroxaban ANTICOAGULANT 20 MG Tabs tablet    XARELTO    90 tablet    TAKE ONE TABLET BY MOUTH ONE TIME DAILY WITH DINNER    Atrial fibrillation, unspecified type (H)       rosuvastatin 20 MG tablet    CRESTOR    90 tablet    Take 1 tablet (20 mg) by mouth daily    Coronary artery calcification       Vitamin D3 5000 UNITS Chew      Take 1 capsule by mouth daily        * Notice:  This list has 2 medication(s) that are the same as other medications prescribed for you. Read the directions carefully, and ask your doctor or other care provider to review them with you.      "

## 2017-12-28 NOTE — PROGRESS NOTES
Nephrology Clinic    Te Yoder MRN:[id number] YOB: 1950  Date of Service: 12/28/2017  Primary care provider: Delta Kim  Requesting physician: Self referred     ASSESSMENT AND RECOMMENDATIONS:   1.  CKD stage III-GFR fluctuates around 60.  Slight bump in creatinine noted compared to last visit likely secondary to bumping his hydrochlorothiazide dose as well as recent use of NSAIDs.    2.  Proteinuria-in the microalbuminuria range.  Secondary to combination of diabetes mellitus and hypertension.  Patient already on lisinopril.      3.  Hypertension-blood pressure well controlled on current regimen continue the same.  Patient advised to monitor his blood pressure at home.    Focus management at this time would be in secondary prevention including good control of diabetes mellitus and high blood pressure.  Patient  was educated regarding chronic kidney disease as well as progression of diabetic nephropathy.  He was counseled on dietary modification, need  for exercise, losing weight, cutting back on salt in his diet.  He is advised to continue to wear his CPAP for sleep apnea. Advised to avoid regular use of NSAIDs.    No other investigations or change in medication at this point.    Follow-up- patient will continue to follow up with his primary care provider and return back to us on an as needed basis.    REASON FOR CONSULT: CKD, HTN, Proteinuria    HISTORY OF PRESENT ILLNESS:    Te Yoder is a 67 year old male who He is self-referred for evaluation of kidney disease and proteinuria Ms. Graham has a diabetes for the past 30 years or so.  He also has diagnosis of hypertension which is mostly well controlled with medications.. Recently has been more concerned about rise in his creatinine as well as finding of microalbuminuria estimated at 250 mg.  Review his kidney function suggest his baseline creatinine is around 1.1-1.2.  Today's marginally high at 1.29.    Patient reports his diabetes is  mostly well controlled.  His HbA1c's have been around 7.  He describes some neuropathy is the only completion associated with diabetes.  Denies any diabetic retinopathy.  He has had microalbuminuria for At least a year now.  On most recent evaluation it was up to 250 mg and he was worried about it.  Today it is down to 41 mg/g of creatinine.  He is on lisinopril 20 mg twice a day    His blood pressure had been trending higher recently and his hydrochlorothiazide dose was doubled.  He does not take his blood pressure at home.  Last month in office it was 109 systolic.  Today slightly higher but still within acceptable range.  He reports he tries to avoid salt in his diet.  He has diagnosis of sleep apnea and wears a CPAP.  Denies excessive caffeine intake.  He took ibuprofen 600 mg yesterday for shoulder pain but denies regular use of it she.     Last echocardiogram is from 2014 which did not show any left ventricular hypertrophy.  As noted he has mild albuminuria CKD stage III.  No history of coronary event, stroke or syncope in the past.  Denies any chest pain shortness of breath, orthopnea, PND. No difficulty in passing urine.  PAST MEDICAL HISTORY:  Past Medical History:   Diagnosis Date     Afferent pupillary defect      Anemia      Atrial fibrillation (H) 12/10/14    EKG done at PMD 12/10/14      B12 deficiency      Calculus of kidney     PSA followed by urology     CKD (chronic kidney disease), stage II      Exotropia      HTN (hypertension)      Hyperlipidaemia      Hyperlipidemia      Microalbuminuria      Morbid obesity (H)      Nonspecific abnormal unspecified cardiovascular function study      Optic atrophy, left eye     LE, mild     Optic disc pallor     LE     ROC on CPAP      Phrenic neuropathy      Type II or unspecified type diabetes mellitus without mention of complication, not stated as uncontrolled     retinopathy and nephropathy - followed by Dr. Arnold     PAST SURGICAL HISTORY:  Past Surgical  "History:   Procedure Laterality Date     COLONOSCOPY  2003     COLONOSCOPY  11/25/2013    Procedure: COLONOSCOPY;  COLONOSCOPY;  Surgeon: Uday Taylor MD;  Location:  GI     MEDICATIONS:  Prescription Medications as of 12/28/2017             metFORMIN (GLUCOPHAGE) 1000 MG tablet Take 1 tablet (1,000 mg) by mouth 2 times daily (with meals)    rivaroxaban ANTICOAGULANT (XARELTO) 20 MG TABS tablet TAKE ONE TABLET BY MOUTH ONE TIME DAILY WITH DINNER    metoprolol (TOPROL-XL) 50 MG 24 hr tablet Take 1 tablet (50 mg) by mouth daily    lisinopril (PRINIVIL/ZESTRIL) 20 MG tablet TAKE 1 TABLET (20 MG) BY MOUTH 2 TIMES DAILY    mupirocin (BACTROBAN) 2 % cream Apply topically 2 times daily as needed    ammonium lactate (LAC-HYDRIN) 12 % cream Apply topically 2 times daily as needed for dry skin    rosuvastatin (CRESTOR) 20 MG tablet Take 1 tablet (20 mg) by mouth daily    hydrochlorothiazide 12.5 MG TABS tablet Take 1 tablet (12.5 mg) by mouth daily    order for DME b knee high compression stockings - 15-20 mm Hg    order for DME DREAMSTATION  5-15CM/H20  NASAL COMFORT BLUE    glipiZIDE (GLIPIZIDE XL) 10 MG 24 hr tablet Take 1 tablet (10 mg) by mouth daily    insulin syringe-needle U-100 (BD INSULIN SYRINGE ULTRAFINE) 30G X 1/2\" 0.5 ML Use daily as directed.    order for DME Equipment being ordered: diabetic shoes    Cholecalciferol (VITAMIN D3) 5000 UNITS CHEW Take 1 capsule by mouth daily     ASPIRIN NOT PRESCRIBED, INTENTIONAL, Reported on 3/8/2017    insulin pen needle (B-D U/F) 31G X 8 MM Use 3-5 times daily as directed.    canaliflozin (INVOKANA) tablet Take 100 mg by mouth every morning (before breakfast)    cyanocolbalamin (VITAMIN  B-12) 1000 MCG tablet Take 2 tablets by mouth daily     insulin aspart (NOVOLOG FLEXPEN) SOLN 100 UNIT/ML Inject under the skin 3 times daily before meals as directed by sliding scale.    ACCU-CHEK CLAUDIO Test 3-4 times daily as directed    LANTUS 100 UNIT/ML SC SOLN Inject 50 Units " "Subcutaneous 2 times daily.         ALLERGIES:    Allergies   Allergen Reactions     Simvastatin Other (See Comments)     Muscle aches, elevated CK levels     REVIEW OF SYSTEMS:  A comprehensive review of systems was performed and found to be negative except as described here or above.   SOCIAL HISTORY:   Social History     Social History     Marital status: Single     Spouse name: N/A     Number of children: 0     Years of education: BA     Occupational History     retired office      Social History Main Topics     Smoking status: Never Smoker     Smokeless tobacco: Never Used     Alcohol use 0.0 oz/week     0 Standard drinks or equivalent per week      Comment: 4 drinks per week     Drug use: No     Sexual activity: Yes     Partners: Female     Other Topics Concern     Not on file     Social History Narrative     FAMILY MEDICAL HISTORY:   Family History   Problem Relation Age of Onset     HEART DISEASE Father      disease     Arthritis Mother      Cancer - colorectal No family hx of      PHYSICAL EXAM:   /76  Pulse 75  Temp 98.2  F (36.8  C) (Oral)  Ht 1.753 m (5' 9\")  Wt 117.9 kg (260 lb)  SpO2 99%  BMI 38.4 kg/m2  GENERAL APPEARANCE: alert and no distress  EYES: nonicteric  HENT: mouth without ulcers or lesions  NECK: supple, no adenopathy  RESP: lungs clear to auscultation   CV: regular rhythm, normal rate, no rub  ABDOMEN: soft, nontender,   Extremities: no clubbing, cyanosis, trace edema  MS: no evidence of inflammation in joints, no muscle tenderness  SKIN: no rash  NEURO: mentation intact and speech normal  PSYCH: affect normal/bright   LABS:   CMP  Recent Labs   Lab Test  12/28/17   1216  11/15/17   1138  05/17/17   1643  01/13/17   1334 12/12/16 09/23/16   1601   01/29/16   1348   12/10/14   0917   03/15/12   1344   NA  140  140  142   --    --   139   < >  142   < >  141   < >   --    POTASSIUM  4.2  3.8  4.3   --   4.0  4.3   < >  4.4   < >  4.2   < >   --    CHLORIDE  105  106  108   --    " --   105   < >  109   < >  106   < >   --    CO2  30  30  28   --    --   29   < >  30   < >  29   < >   --    ANIONGAP  6  4  6   --    --   5   < >  3   < >  6   < >   --    GLC  142*  56*  85   --   85  100*   < >  112*   < >  81   < >   --    BUN  32*  27  26   --    --   25   < >  22   < >  33*   < >   --    CR  1.29*  1.13  1.11   --   1.12  1.13   < >  1.10   < >  1.27*   < >   --    GFRESTIMATED  56*  65  66   --   68  65   < >  67   < >  57*   < >   --    GFRESTBLACK  67  78  80   --   79  79   < >  81   < >  69   < >   --    LEIGH  9.1  9.5  9.4   --    --   9.0   < >  9.0   < >  9.1   < >  9.4   MAG   --    --    --    --    --    --    --    --    --    --    --   1.7   PHOS  3.1   --    --    --    --    --    --    --    --    --    --    --    PROTTOTAL   --    --   7.4  7.2   --    --    --   7.3   --   7.7   < >   --    ALBUMIN  3.8   --   3.9  3.8   --    --    --   4.0   --   3.9   < >   --    BILITOTAL   --    --   0.5  0.6   --    --    --   0.5   --   0.6   < >   --    ALKPHOS   --    --   69  70   --    --    --   65   --   67   < >   --    AST   --    --   28  22   --    --    --   19   --   22   < >   --    ALT   --    --   30  29   --    --    --   30   --   30   < >   --     < > = values in this interval not displayed.     CBC  Recent Labs   Lab Test  12/28/17   1216  05/17/17   1643  01/13/17   1334  01/29/16   1348   HGB  14.6  14.2  13.7  14.5   WBC  6.6  7.8  8.0  6.8   RBC  4.98  4.80  4.67  4.84   HCT  46.4  44.2  44.2  45.3   MCV  93  92  95  94   MCH  29.3  29.6  29.3  30.0   MCHC  31.5  32.1  31.0*  32.0   RDW  13.5  14.3  13.8  13.8   PLT  158  165  160  171     INRNo lab results found.  ABGNo lab results found.   URINE STUDIES  Recent Labs   Lab Test  12/28/17   1230  01/13/17   0956  01/06/16   1543   COLOR  Yellow  Yellow  Yellow   APPEARANCE  Clear  Clear  Clear   URINEGLC  >499*  500*  >1000*   URINEBILI  Negative  Negative  Negative   URINEKETONE  Negative  Negative  Negative    SG  1.015  1.015  1.014   UBLD  Small*  Trace*  Trace*   URINEPH  5.0  5.5  5.0   PROTEIN  Negative  30*  Negative   UROBILINOGEN   --   0.2   --    NITRITE  Negative  Negative  Negative   LEUKEST  Negative  Negative  Negative   RBCU  1   --   2   WBCU  0   --   <1     Brooklyn Rao MD

## 2017-12-28 NOTE — LETTER
12/28/2017      RE: Te Yoder  [address]  [facility] [address]       Nephrology Clinic    Te Yoder MRN:[id number] YOB: 1950  Date of Service: 12/28/2017  Primary care provider: Delta Kim  Requesting physician: Self referred     ASSESSMENT AND RECOMMENDATIONS:   1.  CKD stage III-GFR fluctuates around 60.  Slight bump in creatinine noted compared to last visit likely secondary to bumping his hydrochlorothiazide dose as well as recent use of NSAIDs.    2.  Proteinuria-in the microalbuminuria range.  Secondary to combination of diabetes mellitus and hypertension.  Patient already on lisinopril.      3.  Hypertension-blood pressure well controlled on current regimen continue the same.  Patient advised to monitor his blood pressure at home.    Focus management at this time would be in secondary prevention including good control of diabetes mellitus and high blood pressure.  Patient  was educated regarding chronic kidney disease as well as progression of diabetic nephropathy.  He was counseled on dietary modification, need  for exercise, losing weight, cutting back on salt in his diet.  He is advised to continue to wear his CPAP for sleep apnea. Advised to avoid regular use of NSAIDs.    No other investigations or change in medication at this point.    Follow-up- patient will continue to follow up with his primary care provider and return back to us on an as needed basis.    REASON FOR CONSULT: CKD, HTN, Proteinuria    HISTORY OF PRESENT ILLNESS:    Te Yoder is a 67 year old male who He is self-referred for evaluation of kidney disease and proteinuria Ms. Graham has a diabetes for the past 30 years or so.  He also has diagnosis of hypertension which is mostly well controlled with medications.. Recently has been more concerned about rise in his creatinine as well as finding of microalbuminuria estimated at 250 mg.  Review his kidney function suggest his baseline creatinine is  around 1.1-1.2.  Today's marginally high at 1.29.    Patient reports his diabetes is mostly well controlled.  His HbA1c's have been around 7.  He describes some neuropathy is the only completion associated with diabetes.  Denies any diabetic retinopathy.  He has had microalbuminuria for At least a year now.  On most recent evaluation it was up to 250 mg and he was worried about it.  Today it is down to 41 mg/g of creatinine.  He is on lisinopril 20 mg twice a day    His blood pressure had been trending higher recently and his hydrochlorothiazide dose was doubled.  He does not take his blood pressure at home.  Last month in office it was 109 systolic.  Today slightly higher but still within acceptable range.  He reports he tries to avoid salt in his diet.  He has diagnosis of sleep apnea and wears a CPAP.  Denies excessive caffeine intake.  He took ibuprofen 600 mg yesterday for shoulder pain but denies regular use of it she.     Last echocardiogram is from 2014 which did not show any left ventricular hypertrophy.  As noted he has mild albuminuria CKD stage III.  No history of coronary event, stroke or syncope in the past.  Denies any chest pain shortness of breath, orthopnea, PND. No difficulty in passing urine.  PAST MEDICAL HISTORY:  Past Medical History:   Diagnosis Date     Afferent pupillary defect      Anemia      Atrial fibrillation (H) 12/10/14    EKG done at PMD 12/10/14      B12 deficiency      Calculus of kidney     PSA followed by urology     CKD (chronic kidney disease), stage II      Exotropia      HTN (hypertension)      Hyperlipidaemia      Hyperlipidemia      Microalbuminuria      Morbid obesity (H)      Nonspecific abnormal unspecified cardiovascular function study      Optic atrophy, left eye     LE, mild     Optic disc pallor     LE     ROC on CPAP      Phrenic neuropathy      Type II or unspecified type diabetes mellitus without mention of complication, not stated as uncontrolled     retinopathy  "and nephropathy - followed by Dr. Arnold     PAST SURGICAL HISTORY:  Past Surgical History:   Procedure Laterality Date     COLONOSCOPY  2003     COLONOSCOPY  11/25/2013    Procedure: COLONOSCOPY;  COLONOSCOPY;  Surgeon: Uday Taylor MD;  Location:  GI     MEDICATIONS:  Prescription Medications as of 12/28/2017             metFORMIN (GLUCOPHAGE) 1000 MG tablet Take 1 tablet (1,000 mg) by mouth 2 times daily (with meals)    rivaroxaban ANTICOAGULANT (XARELTO) 20 MG TABS tablet TAKE ONE TABLET BY MOUTH ONE TIME DAILY WITH DINNER    metoprolol (TOPROL-XL) 50 MG 24 hr tablet Take 1 tablet (50 mg) by mouth daily    lisinopril (PRINIVIL/ZESTRIL) 20 MG tablet TAKE 1 TABLET (20 MG) BY MOUTH 2 TIMES DAILY    mupirocin (BACTROBAN) 2 % cream Apply topically 2 times daily as needed    ammonium lactate (LAC-HYDRIN) 12 % cream Apply topically 2 times daily as needed for dry skin    rosuvastatin (CRESTOR) 20 MG tablet Take 1 tablet (20 mg) by mouth daily    hydrochlorothiazide 12.5 MG TABS tablet Take 1 tablet (12.5 mg) by mouth daily    order for DME b knee high compression stockings - 15-20 mm Hg    order for DME DREAMSTATION  5-15CM/H20  NASAL COMFORT BLUE    glipiZIDE (GLIPIZIDE XL) 10 MG 24 hr tablet Take 1 tablet (10 mg) by mouth daily    insulin syringe-needle U-100 (BD INSULIN SYRINGE ULTRAFINE) 30G X 1/2\" 0.5 ML Use daily as directed.    order for DME Equipment being ordered: diabetic shoes    Cholecalciferol (VITAMIN D3) 5000 UNITS CHEW Take 1 capsule by mouth daily     ASPIRIN NOT PRESCRIBED, INTENTIONAL, Reported on 3/8/2017    insulin pen needle (B-D U/F) 31G X 8 MM Use 3-5 times daily as directed.    canaliflozin (INVOKANA) tablet Take 100 mg by mouth every morning (before breakfast)    cyanocolbalamin (VITAMIN  B-12) 1000 MCG tablet Take 2 tablets by mouth daily     insulin aspart (NOVOLOG FLEXPEN) SOLN 100 UNIT/ML Inject under the skin 3 times daily before meals as directed by sliding scale.    ACCU-CHEK " "CLAUDIO Test 3-4 times daily as directed    LANTUS 100 UNIT/ML SC SOLN Inject 50 Units Subcutaneous 2 times daily.         ALLERGIES:    Allergies   Allergen Reactions     Simvastatin Other (See Comments)     Muscle aches, elevated CK levels     REVIEW OF SYSTEMS:  A comprehensive review of systems was performed and found to be negative except as described here or above.   SOCIAL HISTORY:   Social History     Social History     Marital status: Single     Spouse name: N/A     Number of children: 0     Years of education: BA     Occupational History     retired office      Social History Main Topics     Smoking status: Never Smoker     Smokeless tobacco: Never Used     Alcohol use 0.0 oz/week     0 Standard drinks or equivalent per week      Comment: 4 drinks per week     Drug use: No     Sexual activity: Yes     Partners: Female     Other Topics Concern     Not on file     Social History Narrative     FAMILY MEDICAL HISTORY:   Family History   Problem Relation Age of Onset     HEART DISEASE Father      disease     Arthritis Mother      Cancer - colorectal No family hx of      PHYSICAL EXAM:   /76  Pulse 75  Temp 98.2  F (36.8  C) (Oral)  Ht 1.753 m (5' 9\")  Wt 117.9 kg (260 lb)  SpO2 99%  BMI 38.4 kg/m2  GENERAL APPEARANCE: alert and no distress  EYES: nonicteric  HENT: mouth without ulcers or lesions  NECK: supple, no adenopathy  RESP: lungs clear to auscultation   CV: regular rhythm, normal rate, no rub  ABDOMEN: soft, nontender,   Extremities: no clubbing, cyanosis, trace edema  MS: no evidence of inflammation in joints, no muscle tenderness  SKIN: no rash  NEURO: mentation intact and speech normal  PSYCH: affect normal/bright   LABS:   CMP  Recent Labs   Lab Test  12/28/17   1216  11/15/17   1138  05/17/17   1643  01/13/17   1334 12/12/16 09/23/16   1601   01/29/16   1348   12/10/14   0917   03/15/12   1344   NA  140  140  142   --    --   139   < >  142   < >  141   < >   --    POTASSIUM  4.2  3.8  " 4.3   --   4.0  4.3   < >  4.4   < >  4.2   < >   --    CHLORIDE  105  106  108   --    --   105   < >  109   < >  106   < >   --    CO2  30  30  28   --    --   29   < >  30   < >  29   < >   --    ANIONGAP  6  4  6   --    --   5   < >  3   < >  6   < >   --    GLC  142*  56*  85   --   85  100*   < >  112*   < >  81   < >   --    BUN  32*  27  26   --    --   25   < >  22   < >  33*   < >   --    CR  1.29*  1.13  1.11   --   1.12  1.13   < >  1.10   < >  1.27*   < >   --    GFRESTIMATED  56*  65  66   --   68  65   < >  67   < >  57*   < >   --    GFRESTBLACK  67  78  80   --   79  79   < >  81   < >  69   < >   --    LEIGH  9.1  9.5  9.4   --    --   9.0   < >  9.0   < >  9.1   < >  9.4   MAG   --    --    --    --    --    --    --    --    --    --    --   1.7   PHOS  3.1   --    --    --    --    --    --    --    --    --    --    --    PROTTOTAL   --    --   7.4  7.2   --    --    --   7.3   --   7.7   < >   --    ALBUMIN  3.8   --   3.9  3.8   --    --    --   4.0   --   3.9   < >   --    BILITOTAL   --    --   0.5  0.6   --    --    --   0.5   --   0.6   < >   --    ALKPHOS   --    --   69  70   --    --    --   65   --   67   < >   --    AST   --    --   28  22   --    --    --   19   --   22   < >   --    ALT   --    --   30  29   --    --    --   30   --   30   < >   --     < > = values in this interval not displayed.     CBC  Recent Labs   Lab Test  12/28/17   1216  05/17/17   1643  01/13/17   1334  01/29/16   1348   HGB  14.6  14.2  13.7  14.5   WBC  6.6  7.8  8.0  6.8   RBC  4.98  4.80  4.67  4.84   HCT  46.4  44.2  44.2  45.3   MCV  93  92  95  94   MCH  29.3  29.6  29.3  30.0   MCHC  31.5  32.1  31.0*  32.0   RDW  13.5  14.3  13.8  13.8   PLT  158  165  160  171     INRNo lab results found.  ABGNo lab results found.   URINE STUDIES  Recent Labs   Lab Test  12/28/17   1230  01/13/17   0956  01/06/16   1543   COLOR  Yellow  Yellow  Yellow   APPEARANCE  Clear  Clear  Clear   URINEGLC  >499*  500*   >1000*   URINEBILI  Negative  Negative  Negative   URINEKETONE  Negative  Negative  Negative   SG  1.015  1.015  1.014   UBLD  Small*  Trace*  Trace*   URINEPH  5.0  5.5  5.0   PROTEIN  Negative  30*  Negative   UROBILINOGEN   --   0.2   --    NITRITE  Negative  Negative  Negative   LEUKEST  Negative  Negative  Negative   RBCU  1   --   2   WBCU  0   --   <1     Brooklyn Rao MD

## 2017-12-28 NOTE — NURSING NOTE
"Chief Complaint   Patient presents with     Consult     proteinuria consult        Initial /76  Pulse 75  Temp 98.2  F (36.8  C) (Oral)  Ht 1.753 m (5' 9\")  Wt 117.9 kg (260 lb)  SpO2 99%  BMI 38.4 kg/m2 Estimated body mass index is 38.4 kg/(m^2) as calculated from the following:    Height as of this encounter: 1.753 m (5' 9\").    Weight as of this encounter: 117.9 kg (260 lb).  Medication Reconciliation: complete   ANA PIERRE CMA      "

## 2018-01-12 DIAGNOSIS — I48.19 PERSISTENT ATRIAL FIBRILLATION (H): ICD-10-CM

## 2018-01-12 RX ORDER — HYDROCHLOROTHIAZIDE 12.5 MG/1
25 TABLET ORAL DAILY
Qty: 90 TABLET | Refills: 3 | Status: SHIPPED | OUTPATIENT
Start: 2018-01-12 | End: 2018-02-05 | Stop reason: ALTCHOICE

## 2018-01-12 NOTE — TELEPHONE ENCOUNTER
"hydrochlorothiazide 12.5 MG TABS tablet 90 tablet 1 7/13/2017  No         See note below    Last Written Prescription Date:  7-13-17  Last Fill Quantity: 90,  # refills: 1   Last Office Visit with G, P or [facility] prescribing provider:  11-15-17 [facility]   Future Office Visit:       Requested Prescriptions   Pending Prescriptions Disp Refills     hydrochlorothiazide 12.5 MG TABS tablet 90 tablet 1     Sig: Take 1 tablet (12.5 mg) by mouth daily    Diuretics (Including Combos) Protocol Failed    1/12/2018  9:56 AM       Failed - Normal serum creatinine on file in past 12 months    Recent Labs   Lab Test  12/28/17   1216   CR  1.29*             Passed - Blood pressure under 140/90    BP Readings from Last 3 Encounters:   12/28/17 139/76   11/15/17 109/57   10/24/17 139/60                Passed - Recent or future visit with authorizing provider's specialty    Patient had office visit in the last year or has a visit in the next 30 days with authorizing provider.  See \"Patient Info\" tab in inbasket, or \"Choose Columns\" in Meds & Orders section of the refill encounter.              Passed - Patient is age 18 or older       Passed - Normal serum potassium on file in past 12 months    Recent Labs   Lab Test  12/28/17   1216   POTASSIUM  4.2                   Passed - Normal serum sodium on file in past 12 months    Recent Labs   Lab Test  12/28/17   1216   NA  140              Maisha Patterson (RT) R      "

## 2018-01-12 NOTE — TELEPHONE ENCOUNTER
Patient reported in December 2017 that he is taking 25mg daily (12.5x2) yet I see no documentation in chart where dose has been changed.  Patient was told at LOV 11-15-17 with Dr Kim to have HTN follow-up in January - no OV scheduled.    Maisha Patterson, RT (R)

## 2018-01-24 ENCOUNTER — OFFICE VISIT (OUTPATIENT)
Dept: UROLOGY | Facility: CLINIC | Age: 68
End: 2018-01-24
Payer: COMMERCIAL

## 2018-01-24 ENCOUNTER — HOSPITAL ENCOUNTER (OUTPATIENT)
Dept: GENERAL RADIOLOGY | Facility: CLINIC | Age: 68
Discharge: HOME | End: 2018-01-24
Attending: UROLOGY | Admitting: UROLOGY
Payer: MEDICARE

## 2018-01-24 VITALS
SYSTOLIC BLOOD PRESSURE: 110 MMHG | HEART RATE: 64 BPM | DIASTOLIC BLOOD PRESSURE: 60 MMHG | BODY MASS INDEX: 39.25 KG/M2 | WEIGHT: 265 LBS | HEIGHT: 69 IN

## 2018-01-24 DIAGNOSIS — N40.1 BENIGN PROSTATIC HYPERPLASIA WITH LOWER URINARY TRACT SYMPTOMS, SYMPTOM DETAILS UNSPECIFIED: ICD-10-CM

## 2018-01-24 DIAGNOSIS — N52.01 ERECTILE DYSFUNCTION DUE TO ARTERIAL INSUFFICIENCY: ICD-10-CM

## 2018-01-24 DIAGNOSIS — N20.9 CALCIUM UROLITHIASIS: Primary | ICD-10-CM

## 2018-01-24 DIAGNOSIS — N20.0 CALCULUS OF KIDNEY: ICD-10-CM

## 2018-01-24 LAB
ALBUMIN UR-MCNC: 100 MG/DL
APPEARANCE UR: CLEAR
BILIRUB UR QL STRIP: NEGATIVE
COLOR UR AUTO: YELLOW
GLUCOSE UR STRIP-MCNC: 500 MG/DL
HGB UR QL STRIP: NEGATIVE
KETONES UR STRIP-MCNC: NEGATIVE MG/DL
LEUKOCYTE ESTERASE UR QL STRIP: NEGATIVE
NITRATE UR QL: NEGATIVE
PH UR STRIP: 5 PH (ref 5–7)
PSA SERPL-MCNC: 1.1 NG/ML (ref 0–4)
SOURCE: ABNORMAL
SP GR UR STRIP: 1.01 (ref 1–1.03)
UROBILINOGEN UR STRIP-ACNC: 0.2 EU/DL (ref 0.2–1)

## 2018-01-24 PROCEDURE — 36415 COLL VENOUS BLD VENIPUNCTURE: CPT | Performed by: UROLOGY

## 2018-01-24 PROCEDURE — 81003 URINALYSIS AUTO W/O SCOPE: CPT | Performed by: UROLOGY

## 2018-01-24 PROCEDURE — 74019 RADEX ABDOMEN 2 VIEWS: CPT

## 2018-01-24 PROCEDURE — 99213 OFFICE O/P EST LOW 20 MIN: CPT | Performed by: UROLOGY

## 2018-01-24 PROCEDURE — 84153 ASSAY OF PSA TOTAL: CPT | Performed by: UROLOGY

## 2018-01-24 ASSESSMENT — PAIN SCALES - GENERAL: PAINLEVEL: NO PAIN (0)

## 2018-01-24 NOTE — PROGRESS NOTES
Te Yoder is a 67-year-old gentleman with a history of urolithiasis. His KUB today shows no new stones. Urinalysis is normal except for glucosuria  He has had no flank pain or gross hematuria.  PSA is 1.1  Other past medical history: Diabetes, anemia, atrial fibrillation, B12 deficiency, chronic renal disease, exotropia, hypertension, hyperlipidemia, microalbuminuria, obesity, optic atrophy, sleep apnea, phrenic neuropathy, nonsmoker, erectile dysfunction  Medications: Reviewed. Patient isn't using Trimix currently because he has no partner  Allergies: Simvastatin  Exam: Normal appearance, normal vital signs, alert and oriented, normocephalic, normal respirations, neuro grossly intact. Normal sphincter tone, no rectal mass or impaction, benign feeling prostate gland, normal seminal vesicles  Assessment: History of calcium urolithiasis, BPH, erectile dysfunction  Plan: See me in 12 months for KUB, PSA and JACQUELINE.            Drink 2 L of water daily             Reorder Trimix prn

## 2018-01-24 NOTE — NURSING NOTE
Chief Complaint   Patient presents with     Benign Prostatic Hypertrophy     Patient here to go over KUB and Exam     Erectile Dysfunction     Patient here today for 1 year visit too talk go over results and UA     UA RESULTS:  Recent Labs   Lab Test  01/24/18   1349  12/28/17   1230   COLOR  Yellow  Yellow   APPEARANCE  Clear  Clear   URINEGLC  500*  >499*   URINEBILI  Negative  Negative   URINEKETONE  Negative  Negative   SG  1.015  1.015   UBLD  Negative  Small*   URINEPH  5.0  5.0   PROTEIN  100*  Negative   UROBILINOGEN  0.2   --    NITRITE  Negative  Negative   LEUKEST  Negative  Negative   RBCU   --   1   WBCU   --   0     Percy, MA

## 2018-01-24 NOTE — LETTER
1/24/2018       RE: Te Yoder  [address]  [facility] [address]     Dear Colleague,    Thank you for referring your patient, Te Yoder, to the [facility] [place] [facility]. Please see a copy of my visit note below.    Te Yoder is a 67-year-old gentleman with a history of urolithiasis. His KUB today shows no new stones. Urinalysis is normal except for glucosuria  He has had no flank pain or gross hematuria.  PSA is 1.1  Other past medical history: Diabetes, anemia, atrial fibrillation, B12 deficiency, chronic renal disease, exotropia, hypertension, hyperlipidemia, microalbuminuria, obesity, optic atrophy, sleep apnea, phrenic neuropathy, nonsmoker, erectile dysfunction  Medications: Reviewed. Patient isn't using Trimix currently because he has no partner  Allergies: Simvastatin  Exam: Normal appearance, normal vital signs, alert and oriented, normocephalic, normal respirations, neuro grossly intact. Normal sphincter tone, no rectal mass or impaction, benign feeling prostate gland, normal seminal vesicles  Assessment: History of calcium urolithiasis, BPH, erectile dysfunction  Plan: See me in 12 months for KUB, PSA and JACQUELINE.            Drink 2 L of water daily             Reorder Trimix prn    Again, thank you for allowing me to participate in the care of your patient.      Sincerely,    Marques Beach MD

## 2018-01-24 NOTE — MR AVS SNAPSHOT
After Visit Summary   1/24/2018    Te Yoder    MRN: [id number]           Patient Information     Date Of Birth          1950        Visit Information        Provider Department      1/24/2018 2:10 PM Marques Beach MD [facility] [place]        Today's Diagnoses     Calcium urolithiasis    -  1    Erectile dysfunction due to arterial insufficiency        Benign prostatic hyperplasia with lower urinary tract symptoms, symptom details unspecified           Follow-ups after your visit        Follow-up notes from your care team     Return in about 1 year (around 1/24/2019) for PSA, KUB.      Future tests that were ordered for you today     Open Future Orders        Priority Expected Expires Ordered    XR KUB [[id number]] Routine 1/24/2019 1/24/2019 1/24/2018    PSA Diag Urologic Phys Routine 1/24/2019 1/24/2019 1/24/2018            Who to contact     If you have questions or need follow up information about today's clinic visit or your schedule please contact [facility] directly at [phone].  Normal or non-critical lab and imaging results will be communicated to you by MyChart, letter or phone within 4 business days after the clinic has received the results. If you do not hear from us within 7 days, please contact the clinic through viblasthart or phone. If you have a critical or abnormal lab result, we will notify you by phone as soon as possible.  Submit refill requests through [facility] or call your pharmacy and they will forward the refill request to us. Please allow 3 business days for your refill to be completed.          Additional Information About Your Visit        MyChart Information     [facility] [facility] gives you secure access to your electronic health record. If you see a primary care provider, you can also send messages to your care team and make appointments. If you have questions, please call your primary care clinic.   "If you do not have a primary care provider, please call [phone] and they will assist you.        Care EveryWhere ID     This is your Care EveryWhere ID. This could be used by other organizations to access your [place] medical records  [id number]        Your Vitals Were     Pulse Height BMI (Body Mass Index)             64 1.74 m (5' 8.5\") 39.71 kg/m2          Blood Pressure from Last 3 Encounters:   01/24/18 110/60   12/28/17 139/76   11/15/17 109/57    Weight from Last 3 Encounters:   01/24/18 120.2 kg (265 lb)   12/28/17 117.9 kg (260 lb)   11/15/17 117.9 kg (260 lb)              We Performed the Following     PSA Diag Urologic Phys     UA without Microscopic        Primary Care Provider Office Phone # Fax #    Delta Kim [phone] [address] [facility] [address]  [facility] [address]        Equal Access to Services     CAMPBELL LYLES : Hadii aad ku hadasho Soomaali, waaxda luqadaha, qaybta kaalmada adeegyada, waxay idiin hayaan bradley castillo . So [facility] [phone].    ATENCIÓN: Si habla español, tiene a lundberg disposición servicios gratuitos de asistencia lingüística. [facility] [phone].    We comply with applicable federal civil rights laws and [place] laws. We do not discriminate on the basis of race, color, national origin, age, disability, sex, sexual orientation, or gender identity.            Thank you!     Thank you for choosing [facility] [place]  for your care. Our goal is always to provide you with excellent care. Hearing back from our patients is one way we can continue to improve our services. Please take a few minutes to complete the written survey that you may receive in the mail after your visit with us. Thank you!             Your Updated Medication List - Protect others around you: Learn how to safely use, store and throw away your medicines at www.disposemymeds.org.          This list is accurate as of 1/24/18  2:11 PM.  " "Always use your most recent med list.                   Brand Name Dispense Instructions for use Diagnosis    ACCU-CHEK CLAUDIO      Test 3-4 times daily as directed        ammonium lactate 12 % cream    LAC-HYDRIN    385 g    Apply topically 2 times daily as needed for dry skin    Dry skin       ASPIRIN NOT PRESCRIBED    INTENTIONAL     Reported on 3/8/2017        canaliflozin tablet    INVOKANA     Take 100 mg by mouth every morning (before breakfast)        cyanocobalamin 1000 MCG tablet    vitamin  B-12     Take 2 tablets by mouth daily        glipiZIDE 10 MG 24 hr tablet    glipiZIDE XL    90 tablet    Take 1 tablet (10 mg) by mouth daily    Type 2 diabetes mellitus without complication, with long-term current use of insulin (H)       hydrochlorothiazide 12.5 MG Tabs tablet     90 tablet    Take 2 tablets (25 mg) by mouth daily    Persistent atrial fibrillation (H)       insulin aspart 100 UNITS/ML injection    NovoLOG FLEXpen    15 mL    Inject under the skin 3 times daily before meals as directed by sliding scale.    Type 2 diabetes, HbA1c goal < 7% (H)       insulin pen needle 31G X 8 MM    B-D U/F    1 each    Use 3-5 times daily as directed.    Type 2 diabetes, HbA1c goal < 7% (H)       insulin syringe-needle U-100 30G X 1/2\" 0.5 ML    BD insulin syringe ULTRAFINE    100 each    Use daily as directed.        LANTUS VIAL 100 UNIT/ML injection   Generic drug:  insulin glargine      Inject 50 Units Subcutaneous 2 times daily.        lisinopril 20 MG tablet    PRINIVIL/ZESTRIL    180 tablet    TAKE 1 TABLET (20 MG) BY MOUTH 2 TIMES DAILY    Essential hypertension with goal blood pressure less than 140/90       metFORMIN 1000 MG tablet    GLUCOPHAGE    180 tablet    Take 1 tablet (1,000 mg) by mouth 2 times daily (with meals)    Type 2 diabetes mellitus without complication, with long-term current use of insulin (H)       metoprolol succinate 50 MG 24 hr tablet    TOPROL-XL    90 tablet    Take 1 tablet (50 mg) " by mouth daily    Persistent atrial fibrillation (H)       mupirocin 2 % cream    BACTROBAN    30 g    Apply topically 2 times daily as needed    Open wound       order for DME      DREAMSTATION 5-15CM/H20 NASAL COMFORT BLUE        * order for DME     1 Device    Equipment being ordered: diabetic shoes    Type 2 diabetes, HbA1c goal < 7% (H)       * order for DME     2 Device    b knee high compression stockings - 15-20 mm Hg    Generalized edema       rivaroxaban ANTICOAGULANT 20 MG Tabs tablet    XARELTO    90 tablet    TAKE ONE TABLET BY MOUTH ONE TIME DAILY WITH DINNER    Atrial fibrillation, unspecified type (H)       rosuvastatin 20 MG tablet    CRESTOR    90 tablet    Take 1 tablet (20 mg) by mouth daily    Coronary artery calcification       Vitamin D3 5000 UNITS Chew      Take 1 capsule by mouth daily        * Notice:  This list has 2 medication(s) that are the same as other medications prescribed for you. Read the directions carefully, and ask your doctor or other care provider to review them with you.

## 2018-02-05 ENCOUNTER — OFFICE VISIT (OUTPATIENT)
Dept: PHARMACY | Facility: CLINIC | Age: 68
End: 2018-02-05
Payer: COMMERCIAL

## 2018-02-05 VITALS
HEART RATE: 66 BPM | DIASTOLIC BLOOD PRESSURE: 64 MMHG | BODY MASS INDEX: 40.16 KG/M2 | SYSTOLIC BLOOD PRESSURE: 130 MMHG | WEIGHT: 268 LBS

## 2018-02-05 DIAGNOSIS — I48.91 ATRIAL FIBRILLATION, UNSPECIFIED TYPE (H): ICD-10-CM

## 2018-02-05 DIAGNOSIS — L97.909 VENOUS STASIS ULCER, UNSPECIFIED SITE, UNSPECIFIED ULCER STAGE, UNSPECIFIED WHETHER VARICOSE VEINS PRESENT (H): ICD-10-CM

## 2018-02-05 DIAGNOSIS — E08.21 DIABETES MELLITUS DUE TO UNDERLYING CONDITION WITH DIABETIC NEPHROPATHY, WITHOUT LONG-TERM CURRENT USE OF INSULIN (H): ICD-10-CM

## 2018-02-05 DIAGNOSIS — E08.21 DIABETES MELLITUS DUE TO UNDERLYING CONDITION WITH DIABETIC NEPHROPATHY, WITHOUT LONG-TERM CURRENT USE OF INSULIN (H): Primary | ICD-10-CM

## 2018-02-05 DIAGNOSIS — E78.5 HYPERLIPIDEMIA LDL GOAL <100: ICD-10-CM

## 2018-02-05 DIAGNOSIS — I83.009 VENOUS STASIS ULCER, UNSPECIFIED SITE, UNSPECIFIED ULCER STAGE, UNSPECIFIED WHETHER VARICOSE VEINS PRESENT (H): ICD-10-CM

## 2018-02-05 DIAGNOSIS — I10 ESSENTIAL HYPERTENSION: ICD-10-CM

## 2018-02-05 DIAGNOSIS — E63.9 NUTRITIONAL DISORDER: ICD-10-CM

## 2018-02-05 LAB
HBA1C MFR BLD: 7.3 % (ref 4.3–6)
TSH SERPL DL<=0.005 MIU/L-ACNC: 2.44 MU/L (ref 0.4–4)

## 2018-02-05 PROCEDURE — 83036 HEMOGLOBIN GLYCOSYLATED A1C: CPT | Performed by: INTERNAL MEDICINE

## 2018-02-05 PROCEDURE — 99605 MTMS BY PHARM NP 15 MIN: CPT | Performed by: PHARMACIST

## 2018-02-05 PROCEDURE — 99607 MTMS BY PHARM ADDL 15 MIN: CPT | Performed by: PHARMACIST

## 2018-02-05 PROCEDURE — 84443 ASSAY THYROID STIM HORMONE: CPT | Performed by: INTERNAL MEDICINE

## 2018-02-05 PROCEDURE — 36415 COLL VENOUS BLD VENIPUNCTURE: CPT | Performed by: INTERNAL MEDICINE

## 2018-02-05 RX ORDER — HYDROCHLOROTHIAZIDE 12.5 MG/1
25 CAPSULE ORAL DAILY
Qty: 180 CAPSULE | Refills: 3 | Status: SHIPPED | OUTPATIENT
Start: 2018-02-05 | End: 2019-03-28

## 2018-02-05 NOTE — MR AVS SNAPSHOT
After Visit Summary   2/5/2018    Te Yoder    MRN: [id number]           Patient Information     Date Of Birth          1950        Visit Information        Provider Department      2/5/2018 10:30 AM Tosin Gillette, [facility] MTM        Today's Diagnoses     Essential hypertension    -  1    Diabetes mellitus due to underlying condition with diabetic nephropathy, without long-term current use of insulin (H)          Care Instructions    Recommendations from today's MTM visit:                                                    MTM (medication therapy management) is a service provided by a clinical pharmacist designed to help you get the most of out of your medicines.   Today we reviewed what your medicines are for, how to know if they are working, that your medicines are safe and how to make your medicine regimen as easy as possible.     1.  I sent in a new prescription for HCTZ capsules instead of tablets.    2.  We will check your A1c and thyroid screening today.    3.  Monitor for associations between neuropathy symptoms and elevated blood sugar readings.    Next MTM visit:  Pending lab results    To schedule another MTM appointment, please call the clinic directly or you may call the MTM scheduling line at [phone] or toll-free at [phone].     My Clinical Pharmacist's contact information:                                                      It was a pleasure seeing you today!  Please feel free to contact me with any questions or concerns you have.      Rebecca Shields PharmD  Pharmaceutical Care Resident  Pager: [phone]    Tosin Gillette PharmD, [facility]  Medication Therapy Management Provider  Pager: [phone]     You may receive a survey about the MTM services you received.  I would appreciate your feedback to help me serve you better in the future. Please fill it out and return it when you can. Your comments will be anonymous.                    Follow-ups after your visit        Your next 10 appointments already scheduled     Feb 05, 2018 11:00 AM CST   LAB with [facility]   [facility]    [address] [place] [id number] [phone]           Please do not eat 10-12 hours before your appointment if you are coming in fasting for labs on lipids, cholesterol, or glucose (sugar). This does not apply to pregnant women. Water, hot tea and black coffee (with nothing added) are okay. Do not drink other fluids, diet soda or chew gum.              Future tests that were ordered for you today     Open Future Orders        Priority Expected Expires Ordered    Hemoglobin A1c Routine  2/5/2019 2/5/2018    TSH with free T4 reflex Routine  2/5/2019 2/5/2018            Who to contact     If you have questions or need follow up information about today's clinic visit or your schedule please contact [facility] directly at [phone].  Normal or non-critical lab and imaging results will be communicated to you by [employer] IOhart, letter or phone within 4 business days after the clinic has received the results. If you do not hear from us within 7 days, please contact the clinic through [employer] or phone. If you have a critical or abnormal lab result, we will notify you by phone as soon as possible.  Submit refill requests through [email or web address] or call your pharmacy and they will forward the refill request to us. Please allow 3 business days for your refill to be completed.          Additional Information About Your Visit        [email or web address] Information     [email or web address] gives you secure access to your electronic health record. If you see a primary care provider, you can also send messages to your care team and make appointments. If you have questions, please call your primary care clinic.  If you do not have a primary care provider, please call [phone] and they will assist you.        Care EveryWhere ID     This is your Care  EveryWhere ID. This could be used by other organizations to access your [place] medical records  [id number]        Your Vitals Were     Pulse BMI (Body Mass Index)                66 40.16 kg/m2           Blood Pressure from Last 3 Encounters:   02/05/18 130/64   01/24/18 110/60   12/28/17 139/76    Weight from Last 3 Encounters:   02/05/18 268 lb (121.6 kg)   01/24/18 265 lb (120.2 kg)   12/28/17 260 lb (117.9 kg)                 Today's Medication Changes          These changes are accurate as of 2/5/18 10:56 AM.  If you have any questions, ask your nurse or doctor.               Start taking these medicines.        Dose/Directions    hydrochlorothiazide 12.5 MG capsule   Commonly known as:  MICROZIDE   Used for:  Essential hypertension   Replaces:  hydrochlorothiazide 12.5 MG Tabs tablet   Started by:  Tosin Gillette RPH        Dose:  25 mg   Take 2 capsules (25 mg) by mouth daily   Quantity:  180 capsule   Refills:  3         Stop taking these medicines if you haven't already. Please contact your care team if you have questions.     hydrochlorothiazide 12.5 MG Tabs tablet   Replaced by:  hydrochlorothiazide 12.5 MG capsule   Stopped by:  Tosin Gillette RPH                Where to get your medicines      These medications were sent to [id number] - [place], [place] - [address], [place] [address]     Phone:  [phone]     hydrochlorothiazide 12.5 MG capsule                Primary Care Provider Office Phone # Fax #    Delta Kim [phone] [address] [address]        Equal Access to Services     CAMPBELL LYLES AH: Hadii carol james Solinsey, waaxda luqadaha, qaybta kaalmarubin awan. So [facility] [phone].    ATENCIÓN: Si habla español, tiene a lundberg disposición servicios gratuitos de asistencia lingüística. Llame al [phone].    We comply with applicable [facility] civil  rights laws and [place] laws. We do not discriminate on the basis of race, color, national origin, age, disability, sex, sexual orientation, or gender identity.            Thank you!     Thank you for choosing [facility]  for your care. Our goal is always to provide you with excellent care. Hearing back from our patients is one way we can continue to improve our services. Please take a few minutes to complete the written survey that you may receive in the mail after your visit with us. Thank you!             Your Updated Medication List - Protect others around you: Learn how to safely use, store and throw away your medicines at www.disposemymeds.org.          This list is accurate as of 2/5/18 10:56 AM.  Always use your most recent med list.                   Brand Name Dispense Instructions for use Diagnosis    ACCU-CHEK CLAUDIO      Test 3-4 times daily as directed        ammonium lactate 12 % cream    LAC-HYDRIN    385 g    Apply topically 2 times daily as needed for dry skin    Dry skin       ASPIRIN NOT PRESCRIBED    INTENTIONAL     Reported on 3/8/2017        canaliflozin tablet    INVOKANA     Take 100 mg by mouth every morning (before breakfast)        cyanocobalamin 1000 MCG tablet    vitamin  B-12     Take 2 tablets by mouth daily        glipiZIDE 10 MG 24 hr tablet    glipiZIDE XL    90 tablet    Take 1 tablet (10 mg) by mouth daily    Type 2 diabetes mellitus without complication, with long-term current use of insulin (H)       hydrochlorothiazide 12.5 MG capsule    MICROZIDE    180 capsule    Take 2 capsules (25 mg) by mouth daily    Essential hypertension       insulin aspart 100 UNITS/ML injection    NovoLOG FLEXpen    15 mL    Inject under the skin 3 times daily before meals as directed by sliding scale.    Type 2 diabetes, HbA1c goal < 7% (H)       insulin pen needle 31G X 8 MM    B-D U/F    1 each    Use 3-5 times daily as directed.    Type 2 diabetes, HbA1c goal < 7% (H)        "insulin syringe-needle U-100 30G X 1/2\" 0.5 ML    BD insulin syringe ULTRAFINE    100 each    Use daily as directed.        LANTUS VIAL 100 UNIT/ML injection   Generic drug:  insulin glargine      Inject 50 Units Subcutaneous 2 times daily.        lisinopril 20 MG tablet    PRINIVIL/ZESTRIL    180 tablet    TAKE 1 TABLET (20 MG) BY MOUTH 2 TIMES DAILY    Essential hypertension with goal blood pressure less than 140/90       metFORMIN 1000 MG tablet    GLUCOPHAGE    180 tablet    Take 1 tablet (1,000 mg) by mouth 2 times daily (with meals)    Type 2 diabetes mellitus without complication, with long-term current use of insulin (H)       metoprolol succinate 50 MG 24 hr tablet    TOPROL-XL    90 tablet    Take 1 tablet (50 mg) by mouth daily    Persistent atrial fibrillation (H)       mupirocin 2 % cream    BACTROBAN    30 g    Apply topically 2 times daily as needed    Open wound       order for DME      DREAMSTATION 5-15CM/H20 NASAL COMFORT BLUE        * order for DME     1 Device    Equipment being ordered: diabetic shoes    Type 2 diabetes, HbA1c goal < 7% (H)       * order for DME     2 Device    b knee high compression stockings - 15-20 mm Hg    Generalized edema       rivaroxaban ANTICOAGULANT 20 MG Tabs tablet    XARELTO    90 tablet    TAKE ONE TABLET BY MOUTH ONE TIME DAILY WITH DINNER    Atrial fibrillation, unspecified type (H)       rosuvastatin 20 MG tablet    CRESTOR    90 tablet    Take 1 tablet (20 mg) by mouth daily    Coronary artery calcification       Vitamin D3 5000 UNITS Chew      Take 1 capsule by mouth daily        * Notice:  This list has 2 medication(s) that are the same as other medications prescribed for you. Read the directions carefully, and ask your doctor or other care provider to review them with you.      "

## 2018-02-05 NOTE — PROGRESS NOTES
SUBJECTIVE/OBJECTIVE:                Te Yoder is a 67 year old male coming in for a follow-up visit for Medication Therapy Management.  He was referred to me from Dr. Brown, he has now established care with Dr. Kim.     Chief Complaint: Follow up from our visit on 10/18/17.  Here to f/up on diabetes and HTN management.    Tobacco: No tobacco use  Alcohol: 1-3 beverages / week    Medication Adherence: no issues reported    Diabetes:  Pt currently taking Invokana 100mg daily, glipizide XL 10mg daily, Lantus 50 units BID (he has not yet changed to Basaglar), metformin 1000mg BID and Novolog sliding scale (once or twice a week, guesses 10-30 units).  He continues being retro-active with Novolog use, even though we've discussed many times before how to use this more proactively.  Pt is not experiencing side effects.  He continues to see Dr. Arnold for diabetes management, but hasn't seen him for several months.  SMB-2 times daily. Readings (per glucometer): 7 day avg 176mg/dL (n13)  AM: 110, 182, 122, 100, 97, 148, 140  Post-prandial: 158  HS: 217, 243  Symptoms of low blood sugar? Sweaty, shaky. Frequency of hypoglycemia? None recently, he generally gets these in the summer months while he's working   Recent symptoms of high blood sugar? Neuropathy.  Eye exam: due - has scheduled in March  Foot exam: due  Microalbumin is not < 30 mg/g. Pt is taking an ACEi/ARB.  Aspirin: Not taking due to Xarelto use  Diet/Exercise: Goes to gym once a week.     Hypertension/A. Fib: Current medications include HCTZ 25mg daily (dose increased by Dr. Kim at last clinic visit), lisionpril 20mg BID, metoprolol XL 50mg daily and Xarelto 20mg daily.  Patient does not self-monitor BP.  Patient reports no medication side effects.  He requests that HCTZ is changed to capsules instead of tablets - the tablets are so small he has a hard time getting them out of the pill bottle.     Hyperlipidemia: Current therapy includes  rosuvastatin 20mg. Patient denies muscle pains or other side effects.    Venous Stasis:  He uses Lac-Hydrin cream on his legs daily and mupirocin cream as needed for wounds, which he finds very effective.  He denies side effects.    Supplements: Current supplements include Vitamin D (he takes varying doses, but up to 5000 IU daily) and vitamin B-12 2000mcg daily.  He denies side effects.  Vitamin B12 level 5/17/17 = 1108pg/mL.  Vitamin D Deficiency Screening Results:  Lab Results   Component Value Date    VITDT 69 12/28/2017    VITDT 67 12/12/2013      Current labs include:  BP Readings from Last 3 Encounters:   01/24/18 110/60   12/28/17 139/76   11/15/17 109/57     Today's Vitals: /64  Pulse 66  Wt 268 lb (121.6 kg)  BMI 40.16 kg/m2     Lab Results   Component Value Date    A1C 7.0 10/18/2017   .  Lab Results   Component Value Date    CHOL 126 10/12/2017     Lab Results   Component Value Date    TRIG 91 10/12/2017     Lab Results   Component Value Date    HDL 31 10/12/2017     Lab Results   Component Value Date    LDL 77 10/12/2017       Liver Function Studies -   Recent Labs   Lab Test  12/28/17   1216  05/17/17   1643   PROTTOTAL   --   7.4   ALBUMIN  3.8  3.9   BILITOTAL   --   0.5   ALKPHOS   --   69   AST   --   28   ALT   --   30       Lab Results   Component Value Date    UCRR 94 12/28/2017    MICROL 39 12/28/2017    UMALCR 41.34 (H) 12/28/2017       Last Basic Metabolic Panel:  Lab Results   Component Value Date     12/28/2017      Lab Results   Component Value Date    POTASSIUM 4.2 12/28/2017     Lab Results   Component Value Date    CHLORIDE 105 12/28/2017     Lab Results   Component Value Date    BUN 32 12/28/2017     Lab Results   Component Value Date    CR 1.29 12/28/2017     GFR Estimate   Date Value Ref Range Status   12/28/2017 56 (L) >60 mL/min/1.7m2 Final     Comment:     Non  GFR Calc   11/15/2017 65 >60 mL/min/1.7m2 Final     Comment:     Non  GFR  Calc   05/17/2017 66 >60 mL/min/1.7m2 Final     Comment:     Non  GFR Calc       TSH   Date Value Ref Range Status   01/29/2016 2.80 0.40 - 4.00 mU/L Final   ]    Most Recent Immunizations   Administered Date(s) Administered     Influenza (H1N1) 12/02/2009     Influenza (High Dose) 3 valent vaccine 09/12/2017     Influenza (IIV3) PF 09/20/2013     Influenza Vaccine IM 3yrs+ 4 Valent IIV4 10/27/2014     Mantoux Tuberculin Skin Test 08/10/2016     Pneumo Conj 13-V (2010&after) 01/29/2016     Pneumococcal 23 valent 03/06/2017     TDAP Vaccine (Adacel) 07/16/2012     Zoster vaccine, live 09/06/2011       ASSESSMENT:              Current medications were reviewed today as discussed above.      Medication Adherence: no issues identified    Diabetes: Needs Improvement. Patient is not meeting A1c goal of < 7%, due for re-check today.  Also due for TSH screening.  He would benefit from using Novolog more pro-actively, but we've discussed this many times in the past and he declines doing so.    Hypertension/A. Fib: Stable. Patient is meeting BP goal of < 140/90mmHg.  We can change HCTZ to capsules.    Hyperlipidemia: Stable. Pt is on high intensity statin which is indicated based on 2013 ACC/AHA guidelines for lipid management.       Venous Stasis: Stable.    Supplements: Stable.     PLAN:                  1.  Orders placed for A1c and TSH to be checked today.  2.  Changed HCTZ Rx to capsules per his preference.    I spent 30 minutes with this patient today. All changes were made via collaborative practice agreement with Delta Kim A copy of the visit note was provided to the patient's primary care provider.     Will follow up pending lab results.    The patient was given a summary of these recommendations as an after visit summary.    Tosin Gillette, PharmD, BCACP  Medication Therapy Management Provider  Pager: [phone]

## 2018-02-05 NOTE — PATIENT INSTRUCTIONS
Recommendations from today's MTM visit:                                                    MTM (medication therapy management) is a service provided by a clinical pharmacist designed to help you get the most of out of your medicines.   Today we reviewed what your medicines are for, how to know if they are working, that your medicines are safe and how to make your medicine regimen as easy as possible.     1.  I sent in a new prescription for HCTZ capsules instead of tablets.    2.  We will check your A1c and thyroid screening today.    3.  Monitor for associations between neuropathy symptoms and elevated blood sugar readings.    Next MTM visit:  Pending lab results    To schedule another MTM appointment, please call the clinic directly or you may call the MTM scheduling line at [phone] or toll-free at [phone].     My Clinical Pharmacist's contact information:                                                      It was a pleasure seeing you today!  Please feel free to contact me with any questions or concerns you have.      Rebecca Shields PharmD  Pharmaceutical Care Resident  Pager: [phone]    Tosin Gillette PharmD, [facility]  Medication Therapy Management Provider  Pager: [phone]     You may receive a survey about the MTM services you received.  I would appreciate your feedback to help me serve you better in the future. Please fill it out and return it when you can. Your comments will be anonymous.

## 2018-02-15 DIAGNOSIS — G47.33 OSA (OBSTRUCTIVE SLEEP APNEA): Primary | ICD-10-CM

## 2018-02-20 DIAGNOSIS — I10 ESSENTIAL HYPERTENSION WITH GOAL BLOOD PRESSURE LESS THAN 140/90: ICD-10-CM

## 2018-02-20 NOTE — TELEPHONE ENCOUNTER
"Requested Prescriptions   Pending Prescriptions Disp Refills     lisinopril (PRINIVIL/ZESTRIL) 20 MG tablet  Last Written Prescription Date:  7/13/17  Last Fill Quantity: 180 tablet,  # refills: 1   Last office visit: 11/15/2017 with prescribing provider:  Julio   Future Office Visit:   180 tablet 1     Sig: TAKE 1 TABLET (20 MG) BY MOUTH 2 TIMES DAILY    ACE Inhibitors (Including Combos) Protocol Failed    2/20/2018  5:31 PM       Failed - Normal serum creatinine on file in past 12 months    Recent Labs   Lab Test  12/28/17   1216   CR  1.29*          Passed - Blood pressure under 140/90 in past 12 months    BP Readings from Last 3 Encounters:   02/05/18 130/64   01/24/18 110/60   12/28/17 139/76          Passed - Recent or future visit with authorizing provider's specialty    Patient had office visit in the last year or has a visit in the next 30 days with authorizing provider.  See \"Patient Info\" tab in inbasket, or \"Choose Columns\" in Meds & Orders section of the refill encounter.        Passed - Patient is age 18 or older       Passed - Normal serum potassium on file in past 12 months    Recent Labs   Lab Test  12/28/17   1216   POTASSIUM  4.2             "

## 2018-02-21 NOTE — TELEPHONE ENCOUNTER
Routing refill request to provider for review/approval because:  Labs out of range:  Creatinine, past creatinines have note been elevated    Please advise as necessary    Yelitza Hamm RN  [employer]

## 2018-02-22 RX ORDER — LISINOPRIL 20 MG/1
TABLET ORAL
Qty: 180 TABLET | Refills: 3 | Status: SHIPPED | OUTPATIENT
Start: 2018-02-22 | End: 2019-03-06

## 2018-02-27 ENCOUNTER — HOSPITAL ENCOUNTER (EMERGENCY)
Facility: CLINIC | Age: 68
Discharge: HOME | End: 2018-02-27
Attending: EMERGENCY MEDICINE | Admitting: EMERGENCY MEDICINE
Payer: MEDICARE

## 2018-02-27 VITALS
HEIGHT: 69 IN | HEART RATE: 75 BPM | RESPIRATION RATE: 16 BRPM | DIASTOLIC BLOOD PRESSURE: 81 MMHG | TEMPERATURE: 98.8 F | WEIGHT: 260 LBS | BODY MASS INDEX: 38.51 KG/M2 | OXYGEN SATURATION: 98 % | SYSTOLIC BLOOD PRESSURE: 129 MMHG

## 2018-02-27 DIAGNOSIS — T14.8XXA BLEEDING FROM WOUND: ICD-10-CM

## 2018-02-27 PROCEDURE — 99283 EMERGENCY DEPT VISIT LOW MDM: CPT

## 2018-02-27 PROCEDURE — 12001 RPR S/N/AX/GEN/TRNK 2.5CM/<: CPT

## 2018-02-27 ASSESSMENT — ENCOUNTER SYMPTOMS
WOUND: 1
DIZZINESS: 0
LIGHT-HEADEDNESS: 0

## 2018-02-27 NOTE — ED PROVIDER NOTES
"  History     Chief Complaint:  Wound Check       HPI   Te Yoder is a 67 year old male with a history of type II DM on insulin, HTN, hyperlipidemia, CKD, and atrial fibrillation currently anticoagulated on Xarelto who presents to the emergency department for a wound check. The patient states that after showering this morning, he was drying off with a towel when a small wound on his left lower extremity began bleeding, which continued in a pulsatile nature since onset. When the patient was unable to stop the bleeding at home, he presented to the ED for further evaluation. The patient has had no known recent injury to this leg, though does have peripheral neuropathy. No other bleeding problems. He has otherwise been feeling generally well, without any recent lightheadedness/dizziness.     Allergies:  Simvastatin    Medications:    Lisinopril  HCTZ  Metformin  Xarelto  Metoprolol  Crestor  Mupirocin cream  Ammonium lactate cream  Glipizide  Invokana  Insulin (Novolog and Lantus)    Past Medical History:    Afferent pupillary defect  Coronary artery calcification  Anemia  atrial fibrillation  Calculus of kidney  CKD  Exotropia  HTN  hyperlipidemia  Obesity  Optic atrophy and disc pallor  Palpitations  Phrenic neuropathy  DM, type II  Cervical radiculopathy  venostasis changes     Past Surgical History:    Colonoscopy     Family History:    CAD  arthritis    Social History:  Presents alone.   Negative for tobacco use.  Positive for alcohol use, 4 drinks weekly.   Marital Status:  Single [1]    Review of Systems   Skin: Positive for wound.   Neurological: Negative for dizziness and light-headedness.   All other systems reviewed and are negative.    Physical Exam   Physical Exam  Patient Vitals for the past 24 hrs:   BP Temp Temp src Pulse Resp SpO2 Height Weight   02/27/18 0748 158/81 98.8  F (37.1  C) Oral 76 18 98 % 1.753 m (5' 9\") 117.9 kg (260 lb)       General: Alert and Interactive.   Head: No signs of trauma. "   Mouth/Throat: Oropharynx is clear and moist.   Eyes: Conjunctivae are normal. Pupils are equal, round, and reactive to light.   Neck: Normal range of motion. No nuchal rigidity.   CV: Normal rate and regular rhythm.    Resp: Effort normal and breath sounds normal. No respiratory distress.   GI: Soft. There is no tenderness or guarding.   MSK: Normal range of motion. no edema.   Neuro: The patient is alert and oriented to person, place, and time.  PERRLA, EOMI, strength in upper/lower extremities normal and symmetrical.   Sensation normal. Speech normal.  GCS eye subscore is 4. GCS verbal subscore is 5. GCS motor subscore is 6.   Skin: Skin is warm and dry. Pin-point wound to left anterior lower leg, 2/3 of way between knee and ankle.  No rash noted.   Psych: normal mood and affect. behavior is normal.     Emergency Department Course   Procedures:  PROCEDURE: Silver Nitrate Cautery    PROCEDURE NOTE:  The location of the patient's bleeding on the anterior left lower leg was identified and cauterized with silver nitrate.  The patient tolerated the procedure well and there were no complications.  He was observed in the emergency department following the procedure and had no recurrence of his bleeding.    Emergency Department Course:  Nursing notes and vitals reviewed. 0802 I performed an exam of the patient as documented above. Silver nitrate cautery was performed, as noted above.     0829 I rechecked the patient, who had no recurrence of bleeding.     Findings and plan explained to the Patient. Patient discharged home with instructions regarding supportive care, medications, and reasons to return. The importance of close follow-up was reviewed.     Impression & Plan    Medical Decision Making:  eT Yoder is a 67 year old male who presents for evaluation of an open, bleeding wound on the left lower extremity. Silver nitrate cautery performed, as noted above. There was no recurrence of bleeding during ED course. I  do not feel that this wound is amendable to suturing at this time. The patient was hemodynamically stable and I feel that laboratory testing can safely be deferred at this time. I will have them follow up with their doctor for recheck.  There are no signs of infection at this point and would not start antibiotics.  No signs of lymphadenitis, lymphangitis, necrotizing fascitis, osteomyelitis, abscess, cellulitis, etc.      Diagnosis:    ICD-10-CM   1. Bleeding from wound T14.8XXA     Disposition:  discharged to home  IOlivia, am serving as a scribe on 2/27/2018 at 8:02 AM to personally document services performed by Christian Curry MD based on my observations and the provider's statements to me.     Olivia Tarango  2/27/2018    EMERGENCY DEPARTMENT       Christian Curry MD  02/28/18 0116

## 2018-02-27 NOTE — ED AVS SNAPSHOT
Emergency Department    [address] [place] [address]    Phone:  [phone]    Fax:  [phone]                                       Te Yoder   MRN: [id number]    Department:   Emergency Department   Date of Visit:  2/27/2018           After Visit Summary Signature Page     I have received my discharge instructions, and my questions have been answered. I have discussed any challenges I see with this plan with the nurse or doctor.    ..........................................................................................................................................  Patient/Patient Representative Signature      ..........................................................................................................................................  Patient Representative Print Name and Relationship to Patient    ..................................................               ................................................  Date                                            Time    ..........................................................................................................................................  Reviewed by Signature/Title    ...................................................              ..............................................  Date                                                            Time

## 2018-02-27 NOTE — DISCHARGE INSTRUCTIONS
Wound Care  Taking proper care of your wound will help it heal. Your healthcare provider may show you how to clean and dress the wound. He or she will also explain how to tell if the wound is healing normally. If you are unsure of how to take care of the wound, be sure to clarify what dressing to use and how often you should change the bandages. Here are the basic steps.     A wound that's not healing normally may be dark in color or have white streaks.   Wash your hands  Tips for washing your hands include:    Use liquid soap and lather for 2 minutes. Scrub between your fingers and under your nails.    Rinse with warm water, keeping your fingers pointing down.    Use a paper towel to dry your hands and to turn off the faucet.  Remove the used dressing  Here are suggestions for removing the dressing:    If dressing changes cause you pain, be sure to take your pain medicine as prescribed by your healthcare provider 30 minutes before dressing changes.    Set up your supplies.    Put on disposable gloves if you re dressing a wound for someone else or your wound is infected.    Loosen the tape by pulling gently toward the wound.    Gently take off the old dressing. If the dressing is stuck to the wound, moisten it with saline (if available) or clean water.    If you have a drain or tube in the wound, be careful not to pull on it.    Remove the dressing 1 layer at a time and put it in a plastic bag. Seal the bag and put it in the trash.    Remove your gloves.  Inspect and dress the wound  Check the wound carefully:    Each time you change the dressing, check the wound carefully to be sure it s healing normally by making sure your wound appears to be pink and moist, and is free of infection.      Wash your hands again. Put on a new pair of gloves.    Clean and dress the wound as directed by your healthcare provider or nurse. Don't put anything in the wound that is not prescribed or directed by your healthcare  provider. If you have a drain or tube, be careful not to pull on it. Make sure to secure the drain or tube as well.    Put all unused supplies in a clean plastic bag. Seal the bag and store it in a clean, dry area between dressing changes.     Be sure to wash your hands again.  Call your healthcare provider  Call your healthcare provider if you see any of the following signs of a problem:    Bleeding that soaks the dressing    Pink fluid weeping from the wound    Increased drainage or drainage that is yellow, yellow-green, or foul-smelling    Increased swelling or pain, or redness or swelling in the skin around the wound    A change in the color of the wound, or if streaks develop in a direction away from the wound    The area between any stitches opens up    An increase in the size of the wound    A fever of 100.4 F (38 C) or higher, or as directed by your healthcare provider    Chills, increased fatigue, or a loss of appetite      Date Last Reviewed: 7/1/2017 2000-2017 [employer]. [address] [place] [address]. All rights reserved. This information is not intended as a substitute for professional medical care. Always follow your healthcare professional's instructions.

## 2018-02-27 NOTE — ED AVS SNAPSHOT
Emergency Department    [address] [facility] [address]    Phone:  [phone]    Fax:  [phone]                                       Te Yoder   MRN: [id number]    Department:   Emergency Department   Date of Visit:  2/27/2018           Patient Information     Date Of Birth          1950        Your diagnoses for this visit were:     Bleeding from wound        You were seen by Christian Curry MD.      Follow-up Information     Follow up with Delta Kim MD In 1 day.    Specialty:  Internal Medicine    Why:  If symptoms worsen    Contact information:    [address] [facility] [address]  [facility] [address] [phone]          Discharge Instructions         Wound Care  Taking proper care of your wound will help it heal. Your healthcare provider may show you how to clean and dress the wound. He or she will also explain how to tell if the wound is healing normally. If you are unsure of how to take care of the wound, be sure to clarify what dressing to use and how often you should change the bandages. Here are the basic steps.     A wound that's not healing normally may be dark in color or have white streaks.   Wash your hands  Tips for washing your hands include:    Use liquid soap and lather for 2 minutes. Scrub between your fingers and under your nails.    Rinse with warm water, keeping your fingers pointing down.    Use a paper towel to dry your hands and to turn off the faucet.  Remove the used dressing  Here are suggestions for removing the dressing:    If dressing changes cause you pain, be sure to take your pain medicine as prescribed by your healthcare provider 30 minutes before dressing changes.    Set up your supplies.    Put on disposable gloves if you re dressing a wound for someone else or your wound is infected.    Loosen the tape by pulling gently toward the wound.    Gently take off the old dressing. If the dressing is stuck to the wound, moisten it with saline  (if available) or clean water.    If you have a drain or tube in the wound, be careful not to pull on it.    Remove the dressing 1 layer at a time and put it in a plastic bag. Seal the bag and put it in the trash.    Remove your gloves.  Inspect and dress the wound  Check the wound carefully:    Each time you change the dressing, check the wound carefully to be sure it s healing normally by making sure your wound appears to be pink and moist, and is free of infection.      Wash your hands again. Put on a new pair of gloves.    Clean and dress the wound as directed by your healthcare provider or nurse. Don't put anything in the wound that is not prescribed or directed by your healthcare provider. If you have a drain or tube, be careful not to pull on it. Make sure to secure the drain or tube as well.    Put all unused supplies in a clean plastic bag. Seal the bag and store it in a clean, dry area between dressing changes.     Be sure to wash your hands again.  Call your healthcare provider  Call your healthcare provider if you see any of the following signs of a problem:    Bleeding that soaks the dressing    Pink fluid weeping from the wound    Increased drainage or drainage that is yellow, yellow-green, or foul-smelling    Increased swelling or pain, or redness or swelling in the skin around the wound    A change in the color of the wound, or if streaks develop in a direction away from the wound    The area between any stitches opens up    An increase in the size of the wound    A fever of 100.4 F (38 C) or higher, or as directed by your healthcare provider    Chills, increased fatigue, or a loss of appetite      Date Last Reviewed: 7/1/2017 2000-2017 [employer]. [address] [place] [address]. All rights reserved. This information is not intended as a substitute for professional medical care. Always follow your healthcare professional's instructions.          24 Hour Appointment  "Hotline       To make an appointment at any [facility], call [phone] ([phone]). If you don't have a family doctor or clinic, we will help you find one. [facility] clinics are conveniently located to serve the needs of you and your family.             Review of your medicines      Our records show that you are taking the medicines listed below. If these are incorrect, please call your family doctor or clinic.        Dose / Directions Last dose taken    ACCU-CHEK CLAUDIO        Test 3-4 times daily as directed   Refills:  0        ammonium lactate 12 % cream   Commonly known as:  LAC-HYDRIN   Quantity:  385 g        Apply topically 2 times daily as needed for dry skin   Refills:  3        ASPIRIN NOT PRESCRIBED   Commonly known as:  INTENTIONAL        Reported on 3/8/2017   Refills:  0        canaliflozin tablet   Commonly known as:  INVOKANA   Dose:  100 mg        Take 100 mg by mouth every morning (before breakfast)   Refills:  0        cyanocobalamin 1000 MCG tablet   Commonly known as:  vitamin  B-12   Dose:  2 tablet        Take 2 tablets by mouth daily   Refills:  0        glipiZIDE 10 MG 24 hr tablet   Commonly known as:  glipiZIDE XL   Dose:  10 mg   Quantity:  90 tablet        Take 1 tablet (10 mg) by mouth daily   Refills:  3        hydrochlorothiazide 12.5 MG capsule   Commonly known as:  MICROZIDE   Dose:  25 mg   Quantity:  180 capsule        Take 2 capsules (25 mg) by mouth daily   Refills:  3        insulin aspart 100 UNITS/ML injection   Commonly known as:  NovoLOG FLEXpen   Quantity:  15 mL        Inject under the skin 3 times daily before meals as directed by sliding scale.   Refills:  0        insulin pen needle 31G X 8 MM   Commonly known as:  B-D U/F   Quantity:  1 each        Use 3-5 times daily as directed.   Refills:  PRN        insulin syringe-needle U-100 30G X 1/2\" 0.5 ML   Commonly known as:  BD insulin syringe ULTRAFINE   Quantity:  100 each        Use daily as directed. "   Refills:  prn        LANTUS VIAL 100 UNIT/ML injection   Dose:  50 Units   Generic drug:  insulin glargine        Inject 50 Units Subcutaneous 2 times daily.   Refills:  0        lisinopril 20 MG tablet   Commonly known as:  PRINIVIL/ZESTRIL   Quantity:  180 tablet        TAKE 1 TABLET (20 MG) BY MOUTH 2 TIMES DAILY   Refills:  3        metFORMIN 1000 MG tablet   Commonly known as:  GLUCOPHAGE   Dose:  1000 mg   Quantity:  180 tablet        Take 1 tablet (1,000 mg) by mouth 2 times daily (with meals)   Refills:  3        metoprolol succinate 50 MG 24 hr tablet   Commonly known as:  TOPROL-XL   Dose:  50 mg   Quantity:  90 tablet        Take 1 tablet (50 mg) by mouth daily   Refills:  1        mupirocin 2 % cream   Commonly known as:  BACTROBAN   Quantity:  30 g        Apply topically 2 times daily as needed   Refills:  1        order for DME        DREAMSTATION 5-15CM/H20 NASAL COMFORT BLUE   Refills:  0        * order for DME   Quantity:  1 Device        Equipment being ordered: diabetic shoes   Refills:  1        * order for DME   Quantity:  2 Device        b knee high compression stockings - 15-20 mm Hg   Refills:  1        rivaroxaban ANTICOAGULANT 20 MG Tabs tablet   Commonly known as:  XARELTO   Quantity:  90 tablet        TAKE ONE TABLET BY MOUTH ONE TIME DAILY WITH DINNER   Refills:  1        rosuvastatin 20 MG tablet   Commonly known as:  CRESTOR   Dose:  20 mg   Quantity:  90 tablet        Take 1 tablet (20 mg) by mouth daily   Refills:  3        Vitamin D3 5000 UNITS Chew   Dose:  1 capsule        Take 1 capsule by mouth daily   Refills:  0        * Notice:  This list has 2 medication(s) that are the same as other medications prescribed for you. Read the directions carefully, and ask your doctor or other care provider to review them with you.            Orders Needing Specimen Collection     None      Pending Results     No orders found from 2/25/2018 to 2/28/2018.            Pending Culture Results      No orders found from 2/25/2018 to 2/28/2018.            Pending Results Instructions     If you had any lab results that were not finalized at the time of your Discharge, you can call the ED Lab Result RN at [phone]. You will be contacted by this team for any positive Lab results or changes in treatment. The nurses are available 7 days a week from 10A to 6:30P.  You can leave a message 24 hours per day and they will return your call.        Test Results From Your Hospital Stay               Clinical Quality Measure: Blood Pressure Screening     Your blood pressure was checked while you were in the emergency department today. The last reading we obtained was  BP: 158/81 . Please read the guidelines below about what these numbers mean and what you should do about them.  If your systolic blood pressure (the top number) is less than 120 and your diastolic blood pressure (the bottom number) is less than 80, then your blood pressure is normal. There is nothing more that you need to do about it.  If your systolic blood pressure (the top number) is 120-139 or your diastolic blood pressure (the bottom number) is 80-89, your blood pressure may be higher than it should be. You should have your blood pressure rechecked within a year by a primary care provider.  If your systolic blood pressure (the top number) is 140 or greater or your diastolic blood pressure (the bottom number) is 90 or greater, you may have high blood pressure. High blood pressure is treatable, but if left untreated over time it can put you at risk for heart attack, stroke, or kidney failure. You should have your blood pressure rechecked by a primary care provider within the next 4 weeks.  If your provider in the emergency department today gave you specific instructions to follow-up with your doctor or provider even sooner than that, you should follow that instruction and not wait for up to 4 weeks for your follow-up visit.        Thank you for choosing  [facility]       Thank you for choosing [facility] for your care. Our goal is always to provide you with excellent care. Hearing back from our patients is one way we can continue to improve our services. Please take a few minutes to complete the written survey that you may receive in the mail after you visit with us. Thank you!        [employer] Information     [employer] gives you secure access to your electronic health record. If you see a primary care provider, you can also send messages to your care team and make appointments. If you have questions, please call your primary care clinic.  If you do not have a primary care provider, please call [phone] and they will assist you.        Care EveryWhere ID     This is your Care EveryWhere ID. This could be used by other organizations to access your [facility] medical records  [id number]        Equal Access to Services     LYNN LYLES : Linda Bhatti, malissa caputo, ruba pickens, rubin dill. So [facility] [phone].    ATENCIÓN: Si habla español, tiene a lundberg disposición servicios gratuitos de asistencia lingüística. Llame al [phone].    We comply with applicable federal civil rights laws and [place] laws. We do not discriminate on the basis of race, color, national origin, age, disability, sex, sexual orientation, or gender identity.            After Visit Summary       This is your record. Keep this with you and show to your community pharmacist(s) and doctor(s) at your next visit.

## 2018-03-01 ENCOUNTER — TRANSFERRED RECORDS (OUTPATIENT)
Dept: HEALTH INFORMATION MANAGEMENT | Facility: CLINIC | Age: 68
End: 2018-03-01

## 2018-04-02 ENCOUNTER — TELEPHONE (OUTPATIENT)
Dept: FAMILY MEDICINE | Facility: CLINIC | Age: 68
End: 2018-04-02

## 2018-04-02 NOTE — TELEPHONE ENCOUNTER
Needs script that Cyndie Gama Reason for Call:  Form, our goal is to have forms completed with 72 hours, however, some forms may require a visit or additional information.    Type of letter, form or note:  Request for information    Who is the form from?: Patient    Where did the form come from: Patient or family brought in       What clinic location was the form placed at?: [facility]    Where the form was placed: Given to MA/RN Black forms box    What number is listed as a contact on the form?: fax to both of numbers [phone], [phone]        Additional comments: Please call pt when forms have been faxed. Pt's number is [phone]    Call taken on 4/2/2018 at 3:40 PM by Gayathri Carranza

## 2018-04-03 NOTE — TELEPHONE ENCOUNTER
Form and last ov note faxed to both numbers, sent to scanning, copy in file.  Nadine Delong, CMA

## 2018-04-11 DIAGNOSIS — I48.19 PERSISTENT ATRIAL FIBRILLATION (H): ICD-10-CM

## 2018-04-11 NOTE — TELEPHONE ENCOUNTER
"Last Written Prescription Date:  7/13/17  Last Fill Quantity: 90 tablet,  # refills: 1   Last office visit: 11/15/2017 with prescribing provider:  Julio   Future Office Visit:      Requested Prescriptions   Pending Prescriptions Disp Refills     metoprolol succinate (TOPROL-XL) 50 MG 24 hr tablet 90 tablet 1     Sig: Take 1 tablet (50 mg) by mouth daily    Beta-Blockers Protocol Passed    4/11/2018  4:13 PM       Passed - Blood pressure under 140/90 in past 12 months    BP Readings from Last 3 Encounters:   02/27/18 129/81   02/05/18 130/64   01/24/18 110/60                Passed - Patient is age 6 or older       Passed - Recent (12 mo) or future (30 days) visit within the authorizing provider's specialty    Patient had office visit in the last 12 months or has a visit in the next 30 days with authorizing provider or within the authorizing provider's specialty.  See \"Patient Info\" tab in inbasket, or \"Choose Columns\" in Meds & Orders section of the refill encounter.              "

## 2018-04-13 RX ORDER — METOPROLOL SUCCINATE 50 MG/1
50 TABLET, EXTENDED RELEASE ORAL DAILY
Qty: 90 TABLET | Refills: 1 | Status: SHIPPED | OUTPATIENT
Start: 2018-04-13 | End: 2018-09-16

## 2018-04-13 NOTE — TELEPHONE ENCOUNTER
[facility] calling requesting prescription to be sent in as patient is out of medication.  He filled scripts on 9/28/17 and 1/4/18 at [facility].     I called patient and asked about any breaks in medication and patient said that he has been taking medication all along with no breaks.  Sent prescription into pharmacy.     Patient also wanted to schedule Shingrex vaccine. Scheduled patient for next week.    Honey Carroll RN

## 2018-04-13 NOTE — TELEPHONE ENCOUNTER
Routing refill request to provider for review/approval because:  A break in medication-last refill would have been finished 1/2018.  Please authorize if appropriate.  Thanks,  Kaela Pappas RN

## 2018-05-02 DIAGNOSIS — I48.91 ATRIAL FIBRILLATION, UNSPECIFIED TYPE (H): ICD-10-CM

## 2018-05-03 NOTE — TELEPHONE ENCOUNTER
Routing refill request to provider for review/approval because:  Labs out of range:  Last serum creatinine elevated  Dorothy SEAY RN

## 2018-07-07 NOTE — TELEPHONE ENCOUNTER
"Last Written Prescription Date:  7/13/17  Last Fill Quantity: 90 tablet,  # refills: 1   Last office visit: 11/15/2017 with prescribing provider:  Julio   Future Office Visit:      Requested Prescriptions   Pending Prescriptions Disp Refills     rivaroxaban ANTICOAGULANT (XARELTO) 20 MG TABS tablet 90 tablet 1     Sig: TAKE ONE TABLET BY MOUTH ONE TIME DAILY WITH DINNER    Platelet Inhibitors Failed    5/2/2018  2:40 PM       Failed - Normal serum creatinine on file in past 12 months    Recent Labs   Lab Test  12/28/17   1216   CR  1.29*            Passed - Normal ALT on file in past 12 months    Recent Labs   Lab Test  05/17/17   1643   ALT  30            Passed - Normal HGB on file in past 12 months    Recent Labs   Lab Test  12/28/17   1216   HGB  14.6              Passed - Normal AST on file in past 12 months    Recent Labs   Lab Test  05/17/17   1643   AST  28            Passed - Normal Platelets on file in past 12 months    Recent Labs   Lab Test  12/28/17   1216   PLT  158              Passed - Recent (12 mo) or future (30 days) visit within the authorizing provider's specialty    Patient had office visit in the last 12 months or has a visit in the next 30 days with authorizing provider or within the authorizing provider's specialty.  See \"Patient Info\" tab in inbasket, or \"Choose Columns\" in Meds & Orders section of the refill encounter.           Passed - Patient is age 18 or older          " HTN (hypertension)    Hypothyroidism BPH (benign prostatic hyperplasia)    HTN (hypertension)    Hypothyroidism    Myasthenia gravis

## 2018-07-23 DIAGNOSIS — I25.10 CORONARY ARTERY CALCIFICATION: ICD-10-CM

## 2018-07-25 NOTE — TELEPHONE ENCOUNTER
Routing refill request to provider for review/approval because:  Labs out of range:  No Creatinine Kinase on file.     Please review and authorize if appropriate,     Thank you,   Krissy CARBAJAL RN

## 2018-07-26 RX ORDER — ROSUVASTATIN CALCIUM 20 MG/1
20 TABLET, COATED ORAL DAILY
Qty: 90 TABLET | Refills: 1 | Status: SHIPPED | OUTPATIENT
Start: 2018-07-26 | End: 2019-02-09

## 2018-08-13 ENCOUNTER — OFFICE VISIT (OUTPATIENT)
Dept: PHARMACY | Facility: CLINIC | Age: 68
End: 2018-08-13
Payer: COMMERCIAL

## 2018-08-13 VITALS
WEIGHT: 264 LBS | DIASTOLIC BLOOD PRESSURE: 66 MMHG | HEART RATE: 63 BPM | BODY MASS INDEX: 38.99 KG/M2 | SYSTOLIC BLOOD PRESSURE: 130 MMHG

## 2018-08-13 DIAGNOSIS — G62.9 PERIPHERAL POLYNEUROPATHY: ICD-10-CM

## 2018-08-13 DIAGNOSIS — E08.21 DIABETES MELLITUS DUE TO UNDERLYING CONDITION WITH DIABETIC NEPHROPATHY, WITHOUT LONG-TERM CURRENT USE OF INSULIN (H): Primary | ICD-10-CM

## 2018-08-13 PROCEDURE — 99606 MTMS BY PHARM EST 15 MIN: CPT | Performed by: PHARMACIST

## 2018-08-13 PROCEDURE — 99607 MTMS BY PHARM ADDL 15 MIN: CPT | Performed by: PHARMACIST

## 2018-08-13 RX ORDER — INSULIN GLARGINE 100 [IU]/ML
50 INJECTION, SOLUTION SUBCUTANEOUS 2 TIMES DAILY
COMMUNITY
End: 2019-06-03

## 2018-08-13 NOTE — MR AVS SNAPSHOT
After Visit Summary   8/13/2018    Te Yoder    MRN: [id number]           Patient Information     Date Of Birth          1950        Visit Information        Provider Department      8/13/2018 3:30 PM Tosin Gillette, [facility] MTM        Care Instructions    Recommendations from today's MTM visit:                                                    MTM (medication therapy management) is a service provided by a clinical pharmacist designed to help you get the most of out of your medicines.   Today we reviewed what your medicines are for, how to know if they are working, that your medicines are safe and how to make your medicine regimen as easy as possible.     1.  Monitor for any correlations between worse neuropathy symptoms and higher blood sugars.    Next MTM visit:  6 months, sooner if needed    To schedule another MTM appointment, please call the clinic directly or you may call the MTM scheduling line at [phone] or toll-free at [phone].     My Clinical Pharmacist's contact information:                                                      It was a pleasure seeing you today!  Please feel free to contact me with any questions or concerns you have.      Tosin Gillette, PharmD, [facility]  Medication Therapy Management Provider  Pager: [phone]     You may receive a survey about the MTM services you received.  I would appreciate your feedback to help me serve you better in the future. Please fill it out and return it when you can. Your comments will be anonymous.                     Follow-ups after your visit        Your next 10 appointments already scheduled     Aug 22, 2018  3:30 PM CDT   [facility]    [address] [facility] [address] [phone]           Bring a current list of meds and any records pertaining to this visit. For Physicals, please bring immunization records  and any forms needing to be filled out. Please arrive 10 minutes early to complete paperwork.              Who to contact     If you have questions or need follow up information about today's clinic visit or your schedule please contact [facility] directly at [phone].  Normal or non-critical lab and imaging results will be communicated to you by MyChart, letter or phone within 4 business days after the clinic has received the results. If you do not hear from us within 7 days, please contact the clinic through bluepulsehart or phone. If you have a critical or abnormal lab result, we will notify you by phone as soon as possible.  Submit refill requests through [employer] or call your pharmacy and they will forward the refill request to us. Please allow 3 business days for your refill to be completed.          Additional Information About Your Visit        MyChart Information     [employer] gives you secure access to your electronic health record. If you see a primary care provider, you can also send messages to your care team and make appointments. If you have questions, please call your primary care clinic.  If you do not have a primary care provider, please call [phone] and they will assist you.        Care EveryWhere ID     This is your Care EveryWhere ID. This could be used by other organizations to access your [place] medical records  [id number]        Your Vitals Were     Pulse BMI (Body Mass Index)                63 38.99 kg/m2           Blood Pressure from Last 3 Encounters:   08/13/18 130/66   02/27/18 129/81   02/05/18 130/64    Weight from Last 3 Encounters:   08/13/18 264 lb (119.7 kg)   02/27/18 260 lb (117.9 kg)   02/05/18 268 lb (121.6 kg)              Today, you had the following     No orders found for display       Primary Care Provider Office Phone # Fax #    Delta Kim [phone] [address] [address]        Equal Access to  Services     [facility]: Hadii aad ku hadkarynstephan Belindalinsey, waaxda luqadaha, qaybta kaalmada nelia, rubin castillo . So [facility] [phone].    ATENCIÓN: Si habla vaishnavi, tiene a lundberg disposición servicios gratuitos de asistencia lingüística. Llame al [phone].    We comply with applicable federal civil rights laws and [place] laws. We do not discriminate on the basis of race, color, national origin, age, disability, sex, sexual orientation, or gender identity.            Thank you!     Thank you for choosing [facility]  for your care. Our goal is always to provide you with excellent care. Hearing back from our patients is one way we can continue to improve our services. Please take a few minutes to complete the written survey that you may receive in the mail after your visit with us. Thank you!             Your Updated Medication List - Protect others around you: Learn how to safely use, store and throw away your medicines at www.disposemymeds.org.          This list is accurate as of 8/13/18  4:13 PM.  Always use your most recent med list.                   Brand Name Dispense Instructions for use Diagnosis    ACCU-CHEK CLAUDIO      Test 3-4 times daily as directed        ammonium lactate 12 % cream    LAC-HYDRIN    385 g    Apply topically 2 times daily as needed for dry skin    Dry skin       ASPIRIN NOT PRESCRIBED    INTENTIONAL     Reported on 3/8/2017        BASAGLAR 100 UNIT/ML injection      Inject 50 Units Subcutaneous 2 times daily        canaliflozin tablet    INVOKANA     Take 100 mg by mouth every morning (before breakfast)        cyanocobalamin 1000 MCG tablet    vitamin  B-12     Take 2 tablets by mouth daily        glipiZIDE 10 MG 24 hr tablet    glipiZIDE XL    90 tablet    Take 1 tablet (10 mg) by mouth daily    Type 2 diabetes mellitus without complication, with long-term current use of insulin (H)       hydrochlorothiazide 12.5 MG capsule     MICROZIDE    180 capsule    Take 2 capsules (25 mg) by mouth daily    Essential hypertension       insulin aspart 100 UNITS/ML injection    NovoLOG FLEXpen    15 mL    Inject under the skin 3 times daily before meals as directed by sliding scale.    Type 2 diabetes, HbA1c goal < 7% (H)       insulin pen needle 31G X 8 MM    B-D U/F    1 each    Use 3-5 times daily as directed.    Type 2 diabetes, HbA1c goal < 7% (H)       lisinopril 20 MG tablet    PRINIVIL/ZESTRIL    180 tablet    TAKE 1 TABLET (20 MG) BY MOUTH 2 TIMES DAILY    Essential hypertension with goal blood pressure less than 140/90       metFORMIN 1000 MG tablet    GLUCOPHAGE    180 tablet    Take 1 tablet (1,000 mg) by mouth 2 times daily (with meals)    Type 2 diabetes mellitus without complication, with long-term current use of insulin (H)       metoprolol succinate 50 MG 24 hr tablet    TOPROL-XL    90 tablet    Take 1 tablet (50 mg) by mouth daily    Persistent atrial fibrillation (H)       mupirocin 2 % cream    BACTROBAN    30 g    Apply topically 2 times daily as needed    Open wound       order for DME      DREAMSTATION 5-15CM/H20 NASAL COMFORT BLUE        * order for DME     1 Device    Equipment being ordered: diabetic shoes    Type 2 diabetes, HbA1c goal < 7% (H)       * order for DME     2 Device    b knee high compression stockings - 15-20 mm Hg    Generalized edema       rivaroxaban ANTICOAGULANT 20 MG Tabs tablet    XARELTO    90 tablet    TAKE ONE TABLET BY MOUTH ONE TIME DAILY WITH DINNER    Atrial fibrillation, unspecified type (H)       rosuvastatin 20 MG tablet    CRESTOR    90 tablet    Take 1 tablet (20 mg) by mouth daily    Coronary artery calcification       Vitamin D3 5000 units Chew      Take 1 capsule by mouth daily        * Notice:  This list has 2 medication(s) that are the same as other medications prescribed for you. Read the directions carefully, and ask your doctor or other care provider to review them with you.

## 2018-08-13 NOTE — PROGRESS NOTES
"SUBJECTIVE/OBJECTIVE:                Te Yoder is a 67 year old male coming in for a follow-up visit for Medication Therapy Management.  He was referred to me from Dr. Brown, he has since established care with Dr. Kim.     Chief Complaint: Follow up from our visit on 18.  He's here to f/up - has some questions about neuropathy.    Tobacco: No tobacco use  Alcohol: 1-3 beverages / week    Medication Adherence/Access: no issues reported    Neuropathy:  Te c/o continued neuropathy symptoms.  He has an appt scheduled with Dr. Kim to discuss further.  He reports sx typically occur when he's on his feet for extended periods of time.  He doesn't necessarily c/o pain, more numbness and notices his balance worsens.  When he's alone, he generally sits and rests for a bit and sx improve.  When he's at work, he tries to \"push through\" as long as he can.  He's unsure if there's any association between his sx and his BG as he hasn't tried testing his BG when neuropathy sx are bad.  He's really not interested in medications such as gabapentin or Lyrica since he's not really having much pain.    Diabetes:  Pt currently taking Invokana 100mg daily, glipizide XL 10mg daily, Basaglar 50 units BID, metformin 1000mg BID and Novolog sliding scale (once or twice a week, guesses 10-30 units).  He continues being retro-active with Novolog use, even though we've discussed many times before how to use this more proactively.  Pt is not experiencing side effects.  He recently saw Dr. Arnold (end of July) and reports his A1c was 6.9%.  He reports Dr. Arnold talked to him about the amputation risk with Invokana and suggested he call his insurance to see if an alternative SGLT2 inhibitor would be covered.  He reports Farxiga would be covered by his insurance so he's planning to notify Dr. Arnold of this.  SMB-2 times daily. Readings (patient reported): 100-150mg/dL  Symptoms of low blood sugar? Sweaty, shaky. Frequency " of hypoglycemia? None recently  Recent symptoms of high blood sugar? Neuropathy.  Eye exam: up to date  Foot exam: due  Microalbumin is not < 30 mg/g. Pt is taking an ACEi/ARB.  Aspirin: Not taking due to Xarelto use  Diet/Exercise:  He's working again this summer so physical activity levels are higher  Lab Results   Component Value Date    A1C 7.3 02/05/2018    A1C 7.0 10/18/2017    A1C 7.1 10/12/2017    A1C 6.8 05/17/2017    A1C 6.8 03/08/2017     Other medications/conditions were not discussed in detail today due to time constraints, but were reviewed for medication reconciliation purposes.     Today's Vitals: /66  Pulse 63  Wt 264 lb (119.7 kg)  BMI 38.99 kg/m2    ASSESSMENT:              Current medications were reviewed today as discussed above.      Medication Adherence: good, no issues identified    Neuropathy: Plan in place for further evaluation.  May benefit from monitoring for associations between worsened neuropathy sx and higher BG.    Diabetes: Stable. Patient is meeting A1c goal of < 7% (per his report, we don't have those records, Te will request they're sent to us).     PLAN:                  1.  Encouraged Te to monitor for associations between worsened neuropathy sx and higher BG.    I spent 45 minutes with this patient today. A copy of the visit note was provided to the patient's primary care provider.     Will follow up in 6 months, sooner if needed.    The patient was given a summary of these recommendations as an after visit summary.    Tosin Gillette, PharmD, BCACP  Medication Therapy Management Provider  Pager: [phone]

## 2018-08-13 NOTE — PATIENT INSTRUCTIONS
Recommendations from today's MTM visit:                                                    MTM (medication therapy management) is a service provided by a clinical pharmacist designed to help you get the most of out of your medicines.   Today we reviewed what your medicines are for, how to know if they are working, that your medicines are safe and how to make your medicine regimen as easy as possible.     1.  Monitor for any correlations between worse neuropathy symptoms and higher blood sugars.    Next MTM visit:  6 months, sooner if needed    To schedule another MTM appointment, please call the clinic directly or you may call the MTM scheduling line at [phone] or toll-free at [phone].     My Clinical Pharmacist's contact information:                                                      It was a pleasure seeing you today!  Please feel free to contact me with any questions or concerns you have.      Tosin Gillette PharmD, [facility]  Medication Therapy Management Provider  Pager: [phone]     You may receive a survey about the MTM services you received.  I would appreciate your feedback to help me serve you better in the future. Please fill it out and return it when you can. Your comments will be anonymous.

## 2018-08-22 ENCOUNTER — OFFICE VISIT (OUTPATIENT)
Dept: FAMILY MEDICINE | Facility: CLINIC | Age: 68
End: 2018-08-22
Payer: COMMERCIAL

## 2018-08-22 VITALS
OXYGEN SATURATION: 94 % | WEIGHT: 265 LBS | BODY MASS INDEX: 39.25 KG/M2 | TEMPERATURE: 99.2 F | HEART RATE: 65 BPM | SYSTOLIC BLOOD PRESSURE: 118 MMHG | HEIGHT: 69 IN | DIASTOLIC BLOOD PRESSURE: 59 MMHG

## 2018-08-22 DIAGNOSIS — I48.19 PERSISTENT ATRIAL FIBRILLATION (H): ICD-10-CM

## 2018-08-22 DIAGNOSIS — G62.9 PERIPHERAL POLYNEUROPATHY: ICD-10-CM

## 2018-08-22 DIAGNOSIS — G47.33 OSA (OBSTRUCTIVE SLEEP APNEA): ICD-10-CM

## 2018-08-22 DIAGNOSIS — I10 ESSENTIAL HYPERTENSION: ICD-10-CM

## 2018-08-22 DIAGNOSIS — E53.8 B12 DEFICIENCY: ICD-10-CM

## 2018-08-22 DIAGNOSIS — E78.5 HYPERLIPIDEMIA LDL GOAL <100: ICD-10-CM

## 2018-08-22 DIAGNOSIS — E08.21 DIABETES MELLITUS DUE TO UNDERLYING CONDITION WITH DIABETIC NEPHROPATHY, WITHOUT LONG-TERM CURRENT USE OF INSULIN (H): Primary | ICD-10-CM

## 2018-08-22 PROCEDURE — 99207 C FOOT EXAM  NO CHARGE: CPT | Performed by: INTERNAL MEDICINE

## 2018-08-22 PROCEDURE — 99214 OFFICE O/P EST MOD 30 MIN: CPT | Performed by: INTERNAL MEDICINE

## 2018-08-22 NOTE — MR AVS SNAPSHOT
After Visit Summary   8/22/2018    Te Yoder    MRN: [id number]           Patient Information     Date Of Birth          1950        Visit Information        Provider Department      8/22/2018 3:30 PM Delta Kim MD [facility]        Today's Diagnoses     Diabetes mellitus due to underlying condition with diabetic nephropathy, without long-term current use of insulin (H)    -  1    Essential hypertension        Hyperlipidemia LDL goal <100        ROC (obstructive sleep apnea)        B12 deficiency        Persistent atrial fibrillation (H)        Peripheral polyneuropathy          Care Instructions    - Perform exercises that increase strength and balance.   -Receive flu shot in Oct.   - Follow up in Oct for wellness exam           Follow-ups after your visit        Who to contact     If you have questions or need follow up information about today's clinic visit or your schedule please contact [facility] directly at [phone].  Normal or non-critical lab and imaging results will be communicated to you by [employer], letter or phone within 4 business days after the clinic has received the results. If you do not hear from us within 7 days, please contact the clinic through [employer] or phone. If you have a critical or abnormal lab result, we will notify you by phone as soon as possible.  Submit refill requests through [employer] or call your pharmacy and they will forward the refill request to us. Please allow 3 business days for your refill to be completed.          Additional Information About Your Visit        MyChart Information     [employer] gives you secure access to your electronic health record. If you see a primary care provider, you can also send messages to your care team and make appointments. If you have questions, please call your primary care clinic.  If you do not have a primary care provider, please call [phone] and they will assist you.        Care  "EveryWhere ID     This is your Care EveryWhere ID. This could be used by other organizations to access your [place] medical records  [id number]        Your Vitals Were     Pulse Temperature Height Pulse Oximetry BMI (Body Mass Index)       65 99.2  F (37.3  C) (Tympanic) 5' 9\" (1.753 m) 94% 39.13 kg/m2        Blood Pressure from Last 3 Encounters:   08/22/18 118/59   08/13/18 130/66   02/27/18 129/81    Weight from Last 3 Encounters:   08/22/18 265 lb (120.2 kg)   08/13/18 264 lb (119.7 kg)   02/27/18 260 lb (117.9 kg)              We Performed the Following     C FOOT EXAM  NO CHARGE        Primary Care Provider Office Phone # Fax #    Delta Kim [phone] [address]  [facility] [address]        Equal Access to Services     [facility] : Hadii aad ku hadasho Soomaali, waaxda luqadaha, qaybta kaalmada adeegyada, waxay carin haycyndin bradley castillo . So [facility] [phone].    ATENCIÓN: Si crystal espgilda, tiene a lundberg disposición servicios gratuitos de asistencia lingüística. Llame al [phone].    We comply with applicable federal civil rights laws and [place] laws. We do not discriminate on the basis of race, color, national origin, age, disability, sex, sexual orientation, or gender identity.            Thank you!     Thank you for choosing [facility]  for your care. Our goal is always to provide you with excellent care. Hearing back from our patients is one way we can continue to improve our services. Please take a few minutes to complete the written survey that you may receive in the mail after your visit with us. Thank you!             Your Updated Medication List - Protect others around you: Learn how to safely use, store and throw away your medicines at www.disposemymeds.org.          This list is accurate as of 8/22/18 11:59 PM.  Always use your most recent med list.                   Brand Name Dispense Instructions for use Diagnosis    " ACCU-CHEK CLAUDIO      Test 3-4 times daily as directed        ammonium lactate 12 % cream    LAC-HYDRIN    385 g    Apply topically 2 times daily as needed for dry skin    Dry skin       ASPIRIN NOT PRESCRIBED    INTENTIONAL     Reported on 3/8/2017        BASAGLAR 100 UNIT/ML injection      Inject 50 Units Subcutaneous 2 times daily        canaliflozin tablet    INVOKANA     Take 100 mg by mouth every morning (before breakfast)        cyanocobalamin 1000 MCG tablet    vitamin  B-12     Take 2 tablets by mouth daily        glipiZIDE 10 MG 24 hr tablet    glipiZIDE XL    90 tablet    Take 1 tablet (10 mg) by mouth daily    Type 2 diabetes mellitus without complication, with long-term current use of insulin (H)       hydrochlorothiazide 12.5 MG capsule    MICROZIDE    180 capsule    Take 2 capsules (25 mg) by mouth daily    Essential hypertension       insulin aspart 100 UNITS/ML injection    NovoLOG FLEXpen    15 mL    Inject under the skin 3 times daily before meals as directed by sliding scale.    Type 2 diabetes, HbA1c goal < 7% (H)       insulin pen needle 31G X 8 MM    B-D U/F    1 each    Use 3-5 times daily as directed.    Type 2 diabetes, HbA1c goal < 7% (H)       lisinopril 20 MG tablet    PRINIVIL/ZESTRIL    180 tablet    TAKE 1 TABLET (20 MG) BY MOUTH 2 TIMES DAILY    Essential hypertension with goal blood pressure less than 140/90       metFORMIN 1000 MG tablet    GLUCOPHAGE    180 tablet    Take 1 tablet (1,000 mg) by mouth 2 times daily (with meals)    Type 2 diabetes mellitus without complication, with long-term current use of insulin (H)       metoprolol succinate 50 MG 24 hr tablet    TOPROL-XL    90 tablet    Take 1 tablet (50 mg) by mouth daily    Persistent atrial fibrillation (H)       mupirocin 2 % cream    BACTROBAN    30 g    Apply topically 2 times daily as needed    Open wound       order for DME      DREAMSTATION 5-15CM/H20 NASAL COMFORT BLUE        * order for DME     1 Device    Equipment  being ordered: diabetic shoes    Type 2 diabetes, HbA1c goal < 7% (H)       * order for DME     2 Device    b knee high compression stockings - 15-20 mm Hg    Generalized edema       rivaroxaban ANTICOAGULANT 20 MG Tabs tablet    XARELTO    90 tablet    TAKE ONE TABLET BY MOUTH ONE TIME DAILY WITH DINNER    Atrial fibrillation, unspecified type (H)       rosuvastatin 20 MG tablet    CRESTOR    90 tablet    Take 1 tablet (20 mg) by mouth daily    Coronary artery calcification       Vitamin D3 5000 units Chew      Take 1 capsule by mouth daily        * Notice:  This list has 2 medication(s) that are the same as other medications prescribed for you. Read the directions carefully, and ask your doctor or other care provider to review them with you.

## 2018-08-22 NOTE — PROGRESS NOTES
SUBJECTIVE:   Te Yoder is a 67 year old male who presents to clinic today for the following health issues:    Chief Complaint   Patient presents with     Numbness     pt c/o numbness and tingling in feet and some discomfort that goes away when he sits down   For the past couple months has experienced neuropathy in feet which is starting to worsening, though not spreading up legs. States that it occurs intermittently, and tends to resolve after he sits down. Present during examination today. Has not tried any Tx, and has thought about carrying around his glucometer in order to see if his blood sugar is related to his numbness. Endo suggested a medication change from Invokana due to recent publicity. Inquired about medications.     1 week ago A1c measured 6.9 at endo.  Lab Results   Component Value Date    A1C 7.3 02/05/2018    A1C 7.0 10/18/2017    A1C 7.1 10/12/2017    A1C 6.8 05/17/2017    A1C 6.8 03/08/2017     Medications Reviewed     Problem list and histories reviewed & adjusted, as indicated.  Additional history: as documented    Current Outpatient Prescriptions   Medication Sig Dispense Refill     ACCU-CHEK CLAUDIO Test 3-4 times daily as directed       ammonium lactate (LAC-HYDRIN) 12 % cream Apply topically 2 times daily as needed for dry skin 385 g 3     ASPIRIN NOT PRESCRIBED, INTENTIONAL, Reported on 3/8/2017       BASAGLAR 100 UNIT/ML injection Inject 50 Units Subcutaneous 2 times daily       canaliflozin (INVOKANA) tablet Take 100 mg by mouth every morning (before breakfast)       Cholecalciferol (VITAMIN D3) 5000 UNITS CHEW Take 1 capsule by mouth daily        cyanocolbalamin (VITAMIN  B-12) 1000 MCG tablet Take 2 tablets by mouth daily        glipiZIDE (GLIPIZIDE XL) 10 MG 24 hr tablet Take 1 tablet (10 mg) by mouth daily 90 tablet 3     hydrochlorothiazide (MICROZIDE) 12.5 MG capsule Take 2 capsules (25 mg) by mouth daily 180 capsule 3     insulin aspart (NOVOLOG FLEXPEN) SOLN 100 UNIT/ML Inject  under the skin 3 times daily before meals as directed by sliding scale. 15 mL 0     insulin pen needle (B-D U/F) 31G X 8 MM Use 3-5 times daily as directed. 1 each PRN     lisinopril (PRINIVIL/ZESTRIL) 20 MG tablet TAKE 1 TABLET (20 MG) BY MOUTH 2 TIMES DAILY 180 tablet 3     metFORMIN (GLUCOPHAGE) 1000 MG tablet Take 1 tablet (1,000 mg) by mouth 2 times daily (with meals) 180 tablet 3     metoprolol succinate (TOPROL-XL) 50 MG 24 hr tablet Take 1 tablet (50 mg) by mouth daily 90 tablet 1     mupirocin (BACTROBAN) 2 % cream Apply topically 2 times daily as needed 30 g 1     order for DME b knee high compression stockings - 15-20 mm Hg 2 Device 1     order for DME DREAMSTATION  5-15CM/H20  NASAL COMFORT BLUE       order for DME Equipment being ordered: diabetic shoes 1 Device 1     rivaroxaban ANTICOAGULANT (XARELTO) 20 MG TABS tablet TAKE ONE TABLET BY MOUTH ONE TIME DAILY WITH DINNER 90 tablet 1     rosuvastatin (CRESTOR) 20 MG tablet Take 1 tablet (20 mg) by mouth daily 90 tablet 1     Allergies   Allergen Reactions     Simvastatin Other (See Comments)     Muscle aches, elevated CK levels     Recent Labs   Lab Test  02/05/18   1113  12/28/17   1216  11/15/17   1138  10/18/17   1217 10/12/17  05/17/17   1643   01/13/17   1334  07/21/16 01/29/16   1348   A1C  7.3*   --    --   7.0*  7.1*  6.8*   < >   --    < >  6.7*   < >   --    LDL   --    --    --    --   77   --    --   75   --   68   --   99   HDL   --    --    --    --   31*   --    --   31*   --   24*   --   29*   TRIG   --    --    --    --   91   --    --   121   --   215*   --    --    ALT   --    --    --    --    --   30   --   29   --    --    --   30   CR   --   1.29*  1.13   --    --   1.11   --    --    < >   --    < >  1.10   GFRESTIMATED   --   56*  65   --    --   66   --    --    < >   --    < >  67   GFRESTBLACK   --   67  78   --    --   80   --    --    < >   --    < >  81   POTASSIUM   --   4.2  3.8   --    --   4.3   --    --    < >   --   "  < >  4.4   TSH  2.44   --    --    --    --    --    --    --    --    --    --   2.80    < > = values in this interval not displayed.      Reviewed and updated as needed this visit by clinical staff  Allergies  Meds  Problems       Reviewed and updated as needed this visit by Provider       ROS:  CONSTITUTIONAL: POSITIVE for weight gain since 8 days ago.   INTEGUMENTARY/SKIN: NEGATIVE for worrisome rashes, POSITIVE for mole on right shoulder  RESP: NEGATIVE for significant cough or SOB  CV: NEGATIVE for chest pain, palpitations. Swelling improving  GI: NEGATIVE for nausea, abdominal pain, heartburn, or change in bowel habits  : NEGATIVE for frequency, dysuria, or hematuria  MUSCULOSKELETAL: POSITIVE for lower extremity neuropathy, back pain last week that has sense resolved. Worried about cancer because his father had cancer that presented first as back pain.   NEURO: NEGATIVE for weakness, dizziness or paresthesias  PSYCHIATRIC: NEGATIVE for changes in mood or affect    This document serves as a record of the services and decisions personally performed and made by Delta Kim MD. It was created on his behalf by Gonzalez Ansari, a trained medical scribe.  The creation of this document is based on the scribe's personal observations and the provider's statements to the medical scribe.  Gonzalez Ansari, August 22, 2018 3:50 PM  OBJECTIVE:   /59 (BP Location: Left arm, Cuff Size: Adult Large)  Pulse 65  Temp 99.2  F (37.3  C) (Tympanic)  Ht 1.753 m (5' 9\")  Wt 120.2 kg (265 lb)  SpO2 94%  BMI 39.13 kg/m2  Body mass index is 39.13 kg/(m^2).  GENERAL: healthy, alert and no distress  NECK: no adenopathy, no asymmetry, masses, or scars and thyroid normal to palpation  RESP: lungs clear to auscultation - no rales, rhonchi or wheezes  CV: regular rate and rhythm, normal S1 S2, no S3 or S4, no murmur, click or rub, no peripheral edema and peripheral pulses strong  ABDOMEN: obese, soft, nontender, no " hepatosplenomegaly, no masses and bowel sounds normal  MS:  2+ pretibial edema with CVC Feet clean and dry. Paresthesias of ankle and below bilaterally  SKIN: Multiple Seborrheic keratosis, concerned about one on right shoulder   NEURO: Normal strength and tone, mentation intact and speech normal  PSYCH: mentation appears normal, affect normal/bright    Diagnostic Test Results:  No results found for this or any previous visit (from the past 24 hour(s)).  ASSESSMENT/PLAN:   1. Diabetes mellitus due to underlying condition with diabetic nephropathy, without long-term current use of insulin (H)  Likely cause of neuropathy. Discussed pros and cons of oral medications.   Make sure to perform exercises that increase strength and balance.   - Albumin Random Urine Quantitative with Creat Ratio  - C FOOT EXAM  NO CHARGE  - Hemoglobin A1c    2. Essential hypertension  Controlled  BP Readings from Last 6 Encounters:   08/22/18 118/59   08/13/18 130/66   02/27/18 129/81   02/05/18 130/64   01/24/18 110/60   12/28/17 139/76     3. Hyperlipidemia LDL goal <100  Controlled, continue use of rosuvastatin 20 mg  Lab Results   Component Value Date    CHOL 126 10/12/2017     Lab Results   Component Value Date    HDL 31 10/12/2017     Lab Results   Component Value Date    LDL 77 10/12/2017     Lab Results   Component Value Date    TRIG 91 10/12/2017     Lab Results   Component Value Date    CHOLHDLRATIO 3.8 01/30/2015     4. ROC (obstructive sleep apnea)    5. B12 deficiency    6. Persistent atrial fibrillation (H)    7. Peripheral polyneuropathy    Vaccination: Receive flu shot in Oct.     The information in this document, created by a medical scribe for me, accurately reflects the services I personally performed and the decisions made by me. I have reviewed and approved this document for accuracy.  Dr. Delta Kim, August 22, 2018 4:22 PM     Delta Kim MD  [facility]

## 2018-08-22 NOTE — PATIENT INSTRUCTIONS
- Perform exercises that increase strength and balance.   -Receive flu shot in Oct.   - Follow up in Oct for wellness exam

## 2018-09-16 DIAGNOSIS — I48.19 PERSISTENT ATRIAL FIBRILLATION (H): ICD-10-CM

## 2018-09-16 DIAGNOSIS — E11.9 TYPE 2 DIABETES MELLITUS WITHOUT COMPLICATION, WITH LONG-TERM CURRENT USE OF INSULIN (H): ICD-10-CM

## 2018-09-16 DIAGNOSIS — Z79.4 TYPE 2 DIABETES MELLITUS WITHOUT COMPLICATION, WITH LONG-TERM CURRENT USE OF INSULIN (H): ICD-10-CM

## 2018-09-17 RX ORDER — METOPROLOL SUCCINATE 50 MG/1
TABLET, EXTENDED RELEASE ORAL
Qty: 90 TABLET | Refills: 1 | Status: SHIPPED | OUTPATIENT
Start: 2018-09-17 | End: 2019-04-27

## 2018-09-17 NOTE — TELEPHONE ENCOUNTER
"Metformin 1000 mg    Last Written Prescription Date:  09/29/17  Last Fill Quantity: 180 tablets,  # refills: 3   Last office visit: 8/22/2018 with prescribing provider:  Julio Mae Office Visit:      Metoprolol succinate 50 mg    Last Written Prescription Date:  04/13/18  Last Fill Quantity: 90 tablets,  # refills: 1   Last office visit: 8/22/2018 with prescribing provider:  Julio Mae Office Visit:      Requested Prescriptions   Pending Prescriptions Disp Refills     metoprolol succinate (TOPROL-XL) 50 MG 24 hr tablet [Pharmacy Med Name: Metoprolol Succinate ER Oral Tablet Extended Release 24 Hour 50 MG] 90 tablet 0     Sig: Take 1 tablet (50 mg) by mouth daily    Beta-Blockers Protocol Passed    9/16/2018  7:01 AM       Passed - Blood pressure under 140/90 in past 12 months    BP Readings from Last 3 Encounters:   08/22/18 118/59   08/13/18 130/66   02/27/18 129/81                Passed - Patient is age 6 or older       Passed - Recent (12 mo) or future (30 days) visit within the authorizing provider's specialty    Patient had office visit in the last 12 months or has a visit in the next 30 days with authorizing provider or within the authorizing provider's specialty.  See \"Patient Info\" tab in inbasket, or \"Choose Columns\" in Meds & Orders section of the refill encounter.            metFORMIN (GLUCOPHAGE) 1000 MG tablet [Pharmacy Med Name: MetFORMIN HCl Oral Tablet 1000 MG] 180 tablet 2     Sig: TAKE ONE TABLET BY MOUTH TWICE DAILY WITH MEALS    Biguanide Agents Failed    9/16/2018  7:01 AM       Failed - Patient has documented A1c within the specified period of time.    If HgbA1C is 8 or greater, it needs to be on file within the past 3 months.  If less than 8, must be on file within the past 6 months.     Recent Labs   Lab Test  02/05/18   1113   A1C  7.3*            Passed - Blood pressure less than 140/90 in past 6 months    BP Readings from Last 3 Encounters:   08/22/18 118/59   08/13/18 130/66 " "  02/27/18 129/81                Passed - Patient has documented LDL within the past 12 mos.    Recent Labs   Lab Test 10/12/17   LDL  77            Passed - Patient has had a Microalbumin in the past 15 mos.    Recent Labs   Lab Test  12/28/17   1230  12/16/15   MICROALB   --    --   3.0   MICROL  39   < >   --    UMALCR  41.34*   < >  23    < > = values in this interval not displayed.            Passed - Patient is age 10 or older       Passed - Patient's CR is NOT>1.4 OR Patient's EGFR is NOT<45 within past 12 mos.    Recent Labs   Lab Test  12/28/17   1216   GFRESTIMATED  56*   GFRESTBLACK  67       Recent Labs   Lab Test  12/28/17   1216   CR  1.29*            Passed - Patient does NOT have a diagnosis of CHF.       Passed - Recent (6 mo) or future (30 days) visit within the authorizing provider's specialty    Patient had office visit in the last 6 months or has a visit in the next 30 days with authorizing provider or within the authorizing provider's specialty.  See \"Patient Info\" tab in inbasket, or \"Choose Columns\" in Meds & Orders section of the refill encounter.              "

## 2018-10-17 ENCOUNTER — ALLIED HEALTH/NURSE VISIT (OUTPATIENT)
Dept: NURSING | Facility: CLINIC | Age: 68
End: 2018-10-17
Payer: COMMERCIAL

## 2018-10-17 DIAGNOSIS — Z23 NEED FOR PROPHYLACTIC VACCINATION AND INOCULATION AGAINST INFLUENZA: Primary | ICD-10-CM

## 2018-10-17 PROCEDURE — G0008 ADMIN INFLUENZA VIRUS VAC: HCPCS

## 2018-10-17 PROCEDURE — 90662 IIV NO PRSV INCREASED AG IM: CPT

## 2018-10-17 PROCEDURE — 99207 ZZC NO CHARGE NURSE ONLY: CPT

## 2018-10-17 NOTE — MR AVS SNAPSHOT
After Visit Summary   10/17/2018    Te Yoder    MRN: [id number]           Patient Information     Date Of Birth          1950        Visit Information        Provider Department      10/17/2018 4:30 PM [facility] NURSE [place] Lucila Izquierdo        Today's Diagnoses     Need for prophylactic vaccination and inoculation against influenza    -  1       Follow-ups after your visit        Who to contact     If you have questions or need follow up information about today's clinic visit or your schedule please contact [facility] directly at [phone].  Normal or non-critical lab and imaging results will be communicated to you by [email or web address], letter or phone within 4 business days after the clinic has received the results. If you do not hear from us within 7 days, please contact the clinic through IPextremet or phone. If you have a critical or abnormal lab result, we will notify you by phone as soon as possible.  Submit refill requests through enMarkit or call your pharmacy and they will forward the refill request to us. Please allow 3 business days for your refill to be completed.          Additional Information About Your Visit        MyChart Information     enMarkit gives you secure access to your electronic health record. If you see a primary care provider, you can also send messages to your care team and make appointments. If you have questions, please call your primary care clinic.  If you do not have a primary care provider, please call [phone] and they will assist you.        Care EveryWhere ID     This is your Care EveryWhere ID. This could be used by other organizations to access your [facility] medical records  [id number]         Blood Pressure from Last 3 Encounters:   08/22/18 118/59   08/13/18 130/66   02/27/18 129/81    Weight from Last 3 Encounters:   08/22/18 265 lb (120.2 kg)   08/13/18 264 lb (119.7 kg)   02/27/18 260 lb (117.9 kg)              We Performed the Following      ADMIN INFLUENZA (For MEDICARE Patients ONLY) []     FLU VACCINE, INCREASED ANTIGEN, PRESV FREE, AGE 65+ [71120]        Primary Care Provider Office Phone # Fax #    Delta Kim [phone] [address] [facility] [address]  [facility] [address]        Equal Access to Services     [facility]: Hadii aad ku hadasho Soomaali, waaxda luqadaha, qaybta kaalmada adeegyada, waxay idiin hayaan adeeg khdontesh la'aan . So [facility] [phone].    ATENCIÓN: Si habla español, tiene a lundberg disposición servicios gratuitos de asistencia lingüística. Llame al [phone].    We comply with applicable federal civil rights laws and [place] laws. We do not discriminate on the basis of race, color, national origin, age, disability, sex, sexual orientation, or gender identity.            Thank you!     Thank you for choosing [facility]  for your care. Our goal is always to provide you with excellent care. Hearing back from our patients is one way we can continue to improve our services. Please take a few minutes to complete the written survey that you may receive in the mail after your visit with us. Thank you!             Your Updated Medication List - Protect others around you: Learn how to safely use, store and throw away your medicines at www.disposemymeds.org.          This list is accurate as of 10/17/18  4:33 PM.  Always use your most recent med list.                   Brand Name Dispense Instructions for use Diagnosis    ACCU-CHEK CLAUDIO      Test 3-4 times daily as directed        ammonium lactate 12 % cream    LAC-HYDRIN    385 g    Apply topically 2 times daily as needed for dry skin    Dry skin       ASPIRIN NOT PRESCRIBED    INTENTIONAL     Reported on 3/8/2017        BASAGLAR 100 UNIT/ML injection      Inject 50 Units Subcutaneous 2 times daily        canaliflozin tablet    INVOKANA     Take 100 mg by mouth every morning (before breakfast)        cyanocobalamin 1000 MCG tablet    vitamin   B-12     Take 2 tablets by mouth daily        glipiZIDE 10 MG 24 hr tablet    glipiZIDE XL    90 tablet    Take 1 tablet (10 mg) by mouth daily    Type 2 diabetes mellitus without complication, with long-term current use of insulin (H)       hydrochlorothiazide 12.5 MG capsule    MICROZIDE    180 capsule    Take 2 capsules (25 mg) by mouth daily    Essential hypertension       insulin aspart 100 UNITS/ML injection    NovoLOG FLEXpen    15 mL    Inject under the skin 3 times daily before meals as directed by sliding scale.    Type 2 diabetes, HbA1c goal < 7% (H)       insulin pen needle 31G X 8 MM    B-D U/F    1 each    Use 3-5 times daily as directed.    Type 2 diabetes, HbA1c goal < 7% (H)       lisinopril 20 MG tablet    PRINIVIL/ZESTRIL    180 tablet    TAKE 1 TABLET (20 MG) BY MOUTH 2 TIMES DAILY    Essential hypertension with goal blood pressure less than 140/90       metFORMIN 1000 MG tablet    GLUCOPHAGE    180 tablet    TAKE ONE TABLET BY MOUTH TWICE DAILY WITH MEALS    Type 2 diabetes mellitus without complication, with long-term current use of insulin (H)       metoprolol succinate 50 MG 24 hr tablet    TOPROL-XL    90 tablet    Take 1 tablet (50 mg) by mouth daily    Persistent atrial fibrillation (H)       mupirocin 2 % cream    BACTROBAN    30 g    Apply topically 2 times daily as needed    Open wound       order for DME      DREAMSTATION 5-15CM/H20 NASAL COMFORT BLUE        * order for DME     1 Device    Equipment being ordered: diabetic shoes    Type 2 diabetes, HbA1c goal < 7% (H)       * order for DME     2 Device    b knee high compression stockings - 15-20 mm Hg    Generalized edema       rivaroxaban ANTICOAGULANT 20 MG Tabs tablet    XARELTO    90 tablet    TAKE ONE TABLET BY MOUTH ONE TIME DAILY WITH DINNER    Atrial fibrillation, unspecified type (H)       rosuvastatin 20 MG tablet    CRESTOR    90 tablet    Take 1 tablet (20 mg) by mouth daily    Coronary artery calcification       Vitamin  D3 5000 units Chew      Take 1 capsule by mouth daily        * Notice:  This list has 2 medication(s) that are the same as other medications prescribed for you. Read the directions carefully, and ask your doctor or other care provider to review them with you.

## 2018-10-17 NOTE — PROGRESS NOTES

## 2018-10-28 DIAGNOSIS — I48.91 ATRIAL FIBRILLATION, UNSPECIFIED TYPE (H): ICD-10-CM

## 2018-10-29 NOTE — TELEPHONE ENCOUNTER
Requested Prescriptions   Pending Prescriptions Disp Refills     XARELTO 20 MG TABS tablet [Pharmacy Med Name: Xarelto Oral Tablet 20 MG] 30 tablet 0     Sig: TAKE ONE TABLET BY MOUTH ONE TIME DAILY WITH DINNER    Direct Oral Anticoagulant Agents Failed    10/28/2018  3:54 PM       Failed - Creatinine Clearance greater than 50 ml/min on file in past 3 mos    No lab results found.         Failed - Serum creatinine less than or equal to 1.4 on file in past 3 mos    Recent Labs   Lab Test  12/28/17   1216   CR  1.29*            Passed - Normal Platelets on file in past 12 months    Recent Labs   Lab Test  12/28/17   1216   PLT  158              Passed - Medication is active on med list       Passed - Patient is 18 years of age or older       Passed - Recent (6 mo) or future (30 days) visit within the authorizing provider's specialty

## 2018-10-30 NOTE — TELEPHONE ENCOUNTER
Routing refill request to provider for review/approval because:  Labs out of range:    12/28/17   1216    CR  1.29*     Please review and authorize if appropriate.    Thank you,   Axel ABDI RN

## 2018-11-02 RX ORDER — RIVAROXABAN 20 MG/1
TABLET, FILM COATED ORAL
Qty: 90 TABLET | Refills: 1 | Status: SHIPPED | OUTPATIENT
Start: 2018-11-02 | End: 2019-05-22

## 2018-11-27 DIAGNOSIS — N20.0 CALCULUS OF KIDNEY: Primary | ICD-10-CM

## 2019-01-17 DIAGNOSIS — N40.0 BPH (BENIGN PROSTATIC HYPERPLASIA): Primary | ICD-10-CM

## 2019-01-25 ENCOUNTER — HOSPITAL ENCOUNTER (OUTPATIENT)
Dept: GENERAL RADIOLOGY | Facility: CLINIC | Age: 69
Discharge: HOME | End: 2019-01-25
Attending: UROLOGY | Admitting: UROLOGY
Payer: COMMERCIAL

## 2019-01-25 ENCOUNTER — OFFICE VISIT (OUTPATIENT)
Dept: UROLOGY | Facility: CLINIC | Age: 69
End: 2019-01-25
Payer: COMMERCIAL

## 2019-01-25 VITALS
SYSTOLIC BLOOD PRESSURE: 120 MMHG | BODY MASS INDEX: 40.01 KG/M2 | HEIGHT: 68 IN | DIASTOLIC BLOOD PRESSURE: 68 MMHG | OXYGEN SATURATION: 97 % | HEART RATE: 96 BPM | WEIGHT: 264 LBS

## 2019-01-25 DIAGNOSIS — N40.0 BENIGN PROSTATIC HYPERPLASIA WITHOUT LOWER URINARY TRACT SYMPTOMS: ICD-10-CM

## 2019-01-25 DIAGNOSIS — N20.0 CALCULUS OF KIDNEY: ICD-10-CM

## 2019-01-25 DIAGNOSIS — N20.0 CALCULUS OF KIDNEY: Primary | ICD-10-CM

## 2019-01-25 PROBLEM — E11.9 DIABETES MELLITUS, TYPE 2 (H): Status: ACTIVE | Noted: 2019-01-25

## 2019-01-25 LAB
ALBUMIN UR-MCNC: ABNORMAL MG/DL
APPEARANCE UR: CLEAR
BILIRUB UR QL STRIP: NEGATIVE
COLOR UR AUTO: YELLOW
GLUCOSE UR STRIP-MCNC: >=1000 MG/DL
HGB UR QL STRIP: ABNORMAL
KETONES UR STRIP-MCNC: NEGATIVE MG/DL
LEUKOCYTE ESTERASE UR QL STRIP: NEGATIVE
NITRATE UR QL: NEGATIVE
PH UR STRIP: 5.5 PH (ref 5–7)
PSA SERPL-MCNC: 0.82 NG/ML (ref 0–4)
SOURCE: ABNORMAL
SP GR UR STRIP: 1.01 (ref 1–1.03)
UROBILINOGEN UR STRIP-ACNC: 1 EU/DL (ref 0.2–1)

## 2019-01-25 PROCEDURE — 81003 URINALYSIS AUTO W/O SCOPE: CPT | Performed by: UROLOGY

## 2019-01-25 PROCEDURE — 84153 ASSAY OF PSA TOTAL: CPT | Performed by: UROLOGY

## 2019-01-25 PROCEDURE — 99213 OFFICE O/P EST LOW 20 MIN: CPT | Performed by: UROLOGY

## 2019-01-25 PROCEDURE — 36415 COLL VENOUS BLD VENIPUNCTURE: CPT | Performed by: UROLOGY

## 2019-01-25 PROCEDURE — 74019 RADEX ABDOMEN 2 VIEWS: CPT

## 2019-01-25 ASSESSMENT — MIFFLIN-ST. JEOR: SCORE: 1942

## 2019-01-25 ASSESSMENT — PAIN SCALES - GENERAL: PAINLEVEL: NO PAIN (0)

## 2019-01-25 NOTE — PROGRESS NOTES
Te Yoder is a 68-year-old male with a history of diabetes, erectile dysfunction, urolithiasis and BPH.  He has no family history of prostate cancer.  His PSA is stable at 0.82.  His KUB shows no recurrent stones.  His urinalysis is normal except for significant glucosuria  Other past medical history: Anemia, atrial fibrillation, B12 deficiency, stage II chronic renal disease, exotropia, hypertension, hyperlipidemia, microalbuminuria,  morbid obesity, left optic nerve atrophy, sleep apnea, phrenic neuropathy, type 2 diabetes, colonoscopies, non-smoker  Family history: Heart disease  Medications: Aspirin, basilar, Invokana, vitamin D, vitamin B12, glipizide, Microzide, insulin, lisinopril, metformin, metoprolol, Crestor, Xarelto  Allergies: Simvastatin  Review of systems: No dysuria or hematuria.  No difficulty voiding  Exam: Normal appearance other than morbid obesity.  Normocephalic, normal respirations, neuro grossly intact.  Normal vital signs.  Alert and oriented  Normal sphincter tone, no rectal mass or impaction, benign feeling prostate, normal seminal vesicles  Assessment: BPH, erectile dysfunction, glucosuria, no family history of prostate cancer  Patient is not using Trimix.  He does not have a partner and he has felt in the past that it does not work well.  Discussed penile prosthesis.  He asked about shockwave therapy for erectile dysfunction-discussed that this was not FDA approved but he could see Marques Scott MD at [facility] for a study.  He can also see Woodrow Longoria MD at the [facility] for a penile prosthesis in the future if he wishes  No further PSA testing at his age.  Yearly digital rectal exam needed-he will return to me for this.  No PSA in the future unless palpable abnormality.

## 2019-01-25 NOTE — LETTER
RE: Te Yoder  [address]  [place] [address]     Dear Colleague,    Thank you for referring your patient, Te Yoder, to the [facility] [place] at [facility]. Please see a copy of my visit note below.    Te Yoder is a 68-year-old male with a history of diabetes, erectile dysfunction, urolithiasis and BPH.  He has no family history of prostate cancer.  His PSA is stable at 0.82.  His KUB shows no recurrent stones.  His urinalysis is normal except for significant glucosuria  Other past medical history: Anemia, atrial fibrillation, B12 deficiency, stage II chronic renal disease, exotropia, hypertension, hyperlipidemia, microalbuminuria,  morbid obesity, left optic nerve atrophy, sleep apnea, phrenic neuropathy, type 2 diabetes, colonoscopies, non-smoker  Family history: Heart disease  Medications: Aspirin, basilar, Invokana, vitamin D, vitamin B12, glipizide, Microzide, insulin, lisinopril, metformin, metoprolol, Crestor, Xarelto  Allergies: Simvastatin  Review of systems: No dysuria or hematuria.  No difficulty voiding  Exam: Normal appearance other than morbid obesity.  Normocephalic, normal respirations, neuro grossly intact.  Normal vital signs.  Alert and oriented  Normal sphincter tone, no rectal mass or impaction, benign feeling prostate, normal seminal vesicles  Assessment: BPH, erectile dysfunction, glucosuria, no family history of prostate cancer  Patient is not using Trimix.  He does not have a partner and he has felt in the past that it does not work well.  Discussed penile prosthesis.  He asked about shockwave therapy for erectile dysfunction-discussed that this was not FDA approved but he could see Marques Scott MD at [facility] for a study.  He can also see Woodrow Longoria MD at the [facility] for a penile prosthesis in the future if he wishes  No further PSA testing at his age.  Yearly digital rectal exam  needed-he will return to me for this.  No PSA in the future unless palpable abnormality.    Again, thank you for allowing me to participate in the care of your patient.      Sincerely,    Marques Beach MD

## 2019-02-09 DIAGNOSIS — I25.10 CORONARY ARTERY CALCIFICATION: ICD-10-CM

## 2019-02-11 RX ORDER — ROSUVASTATIN CALCIUM 20 MG/1
20 TABLET, COATED ORAL DAILY
Qty: 90 TABLET | Refills: 0 | Status: SHIPPED | OUTPATIENT
Start: 2019-02-11 | End: 2019-05-22

## 2019-02-11 NOTE — TELEPHONE ENCOUNTER
"rosuvastatin (CRESTOR) 20 MG tablet  Last Written Prescription Date:  7/26/18  Last Fill Quantity: 90,  # refills: 1   Last office visit: 8/22/2018 with prescribing provider:  Julio    Future Office Visit:          Requested Prescriptions   Pending Prescriptions Disp Refills     rosuvastatin (CRESTOR) 20 MG tablet [Pharmacy Med Name: Rosuvastatin Calcium Oral Tablet 20 MG] 90 tablet 0     Sig: Take 1 tablet (20 mg) by mouth daily    Statins Protocol Failed - 2/9/2019 12:08 AM       Failed - LDL on file in past 12 months    Recent Labs   Lab Test 10/12/17   LDL 77            Passed - No abnormal creatine kinase in past 12 months    Recent Labs   Lab Test 11/15/17  1138   *               Passed - Recent (12 mo) or future (30 days) visit within the authorizing provider's specialty    Patient had office visit in the last 12 months or has a visit in the next 30 days with authorizing provider or within the authorizing provider's specialty.  See \"Patient Info\" tab in inbasket, or \"Choose Columns\" in Meds & Orders section of the refill encounter.             Passed - Medication is active on med list       Passed - Patient is age 18 or older          "

## 2019-03-06 DIAGNOSIS — I10 ESSENTIAL HYPERTENSION WITH GOAL BLOOD PRESSURE LESS THAN 140/90: ICD-10-CM

## 2019-03-07 ENCOUNTER — OFFICE VISIT (OUTPATIENT)
Dept: PHARMACY | Facility: CLINIC | Age: 69
End: 2019-03-07

## 2019-03-07 VITALS
SYSTOLIC BLOOD PRESSURE: 124 MMHG | DIASTOLIC BLOOD PRESSURE: 75 MMHG | BODY MASS INDEX: 40.45 KG/M2 | HEART RATE: 105 BPM | WEIGHT: 266 LBS

## 2019-03-07 DIAGNOSIS — I48.91 ATRIAL FIBRILLATION, UNSPECIFIED TYPE (H): ICD-10-CM

## 2019-03-07 DIAGNOSIS — I10 ESSENTIAL HYPERTENSION: ICD-10-CM

## 2019-03-07 DIAGNOSIS — E78.5 HYPERLIPIDEMIA LDL GOAL <100: ICD-10-CM

## 2019-03-07 DIAGNOSIS — I83.009 VENOUS STASIS ULCER, UNSPECIFIED SITE, UNSPECIFIED ULCER STAGE, UNSPECIFIED WHETHER VARICOSE VEINS PRESENT (H): ICD-10-CM

## 2019-03-07 DIAGNOSIS — L97.909 VENOUS STASIS ULCER, UNSPECIFIED SITE, UNSPECIFIED ULCER STAGE, UNSPECIFIED WHETHER VARICOSE VEINS PRESENT (H): ICD-10-CM

## 2019-03-07 DIAGNOSIS — E63.9 NUTRITIONAL DISORDER: ICD-10-CM

## 2019-03-07 DIAGNOSIS — E08.21 DIABETES MELLITUS DUE TO UNDERLYING CONDITION WITH DIABETIC NEPHROPATHY, WITHOUT LONG-TERM CURRENT USE OF INSULIN (H): Primary | ICD-10-CM

## 2019-03-07 PROCEDURE — 99607 MTMS BY PHARM ADDL 15 MIN: CPT | Performed by: PHARMACIST

## 2019-03-07 PROCEDURE — 99605 MTMS BY PHARM NP 15 MIN: CPT | Performed by: PHARMACIST

## 2019-03-07 RX ORDER — LISINOPRIL 20 MG/1
TABLET ORAL
Qty: 180 TABLET | Refills: 2 | Status: SHIPPED | OUTPATIENT
Start: 2019-03-07 | End: 2019-11-26

## 2019-03-07 RX ORDER — FLASH GLUCOSE SENSOR
1 KIT MISCELLANEOUS
COMMUNITY
End: 2022-08-15

## 2019-03-07 NOTE — PATIENT INSTRUCTIONS
Recommendations from today's MTM visit:                                                    MTM (medication therapy management) is a service provided by a clinical pharmacist designed to help you get the most of out of your medicines.   Today we reviewed what your medicines are for, how to know if they are working, that your medicines are safe and how to make your medicine regimen as easy as possible.     1.  Call [employer] to see if the V-go device is covered for you.  This is a medical device.  I'll connect with you following your diabetes education visit to see if anything has changed.    Next MTM visit:  I'll connect via [employer] in about 2 weeks    To schedule another MTM appointment, please call the clinic directly or you may call the MTM scheduling line at [phone] or toll-free at [phone].     My Clinical Pharmacist's contact information:                                                      It was a pleasure talking with you today!  Please feel free to contact me with any questions or concerns you have.      Faby Moran PharmD  Medication Therapy Management (MTM) Resident  Pager: [phone]    Tosin Gillette PharmD, [facility]  Medication Therapy Management Provider  Pager: [phone]      You may receive a survey about the MTM services you received by email and/or US Mail.  I would appreciate your feedback to help me serve you better in the future. Your comments will be anonymous.

## 2019-03-07 NOTE — TELEPHONE ENCOUNTER
"Last Written Prescription Date:  2/22/18  Last Fill Quantity: 180 tablet,  # refills: 3   Last office visit: 8/22/2018 with prescribing provider:  Julio   Future Office Visit:   Next 5 appointments (look out 90 days)    Mar 07, 2019 10:30 AM CST  SHORT with Tosin Gillette [facility] ([facility]) [address] [address] [phone]         Requested Prescriptions   Pending Prescriptions Disp Refills     lisinopril (PRINIVIL/ZESTRIL) 20 MG tablet [Pharmacy Med Name: Lisinopril Oral Tablet 20 MG] 180 tablet 2     Sig: TAKE 1 TABLET (20 MG) BY MOUTH 2 TIMES DAILY    ACE Inhibitors (Including Combos) Protocol Failed - 3/6/2019  5:02 PM       Failed - Normal serum creatinine on file in past 12 months    Recent Labs   Lab Test 12/28/17  1216   CR 1.29*            Failed - Normal serum potassium on file in past 12 months    Recent Labs   Lab Test 12/28/17  1216   POTASSIUM 4.2            Passed - Blood pressure under 140/90 in past 12 months    BP Readings from Last 3 Encounters:   01/25/19 120/68   08/22/18 118/59   08/13/18 130/66                Passed - Recent (12 mo) or future (30 days) visit within the authorizing provider's specialty    Patient had office visit in the last 12 months or has a visit in the next 30 days with authorizing provider or within the authorizing provider's specialty.  See \"Patient Info\" tab in inbasket, or \"Choose Columns\" in Meds & Orders section of the refill encounter.             Passed - Medication is active on med list       Passed - Patient is age 18 or older          "

## 2019-03-07 NOTE — PROGRESS NOTES
SUBJECTIVE/OBJECTIVE:                Te Yoder is a 68 year old male coming in for a follow-up visit for Medication Therapy Management.  He was referred to me from Dr. Brown, he has since established care with Dr. Kim.     Chief Complaint: Follow up from our visit on 18.  This visit serves as an initial visit for 2019.  He's had a few medication changes since our last visit.    Tobacco: No tobacco use  Alcohol: 1-3 beverages / week    Medication Adherence/Access: no issues reported    Diabetes:  He continues working closely with Dr. Arnlod as well as diabetes education through the endo clinic and has been set up with the personal [facility], last endo visit was in February and he reports A1c was 8%.  Pt currently taking Jardiance 10mg daily (changed from Invokana due to insurance preference), glipizide XL 10mg daily, Basaglar 50 units BID, metformin 1000mg BID and Novolog via sliding scale (he continues guessing at the dose, and uses to correct his BG after eating most of the time).  He continues being retro-active with Novolog use, even though we've discussed many times before how to use this more proactively.  He reports that diabetes education through the endocrinology office is looking at putting him on the VGo device.  Pt is not experiencing side effects.    SMB-2 times daily. Readings (patient reported): 80mg/dL this morning, always <130mg/dL per his report  Symptoms of low blood sugar? Sweaty, shaky. Frequency of hypoglycemia? Had one episode of BG of 50mg/dL overnight recently, that was the only low he's had in a while  Recent symptoms of high blood sugar? Neuropathy.  Eye exam: due   Foot exam: up to date  Microalbumin is not < 30 mg/g. Pt is taking an ACEi/ARB.  Aspirin: Not taking due to Xarelto use  Diet/Exercise:  He says activity levels have been low over the winter as he hasn't been working (he generally works seasonally in the summer)  Lab Results   Component Value Date     A1C 7.3 02/05/2018    A1C 7.0 10/18/2017    A1C 7.1 10/12/2017    A1C 6.8 05/17/2017    A1C 6.8 03/08/2017     Hypertension/A. Fib: Current medications include HCTZ 25mg daily, lisionpril 20mg BID, metoprolol XL 50mg daily and Xarelto 20mg daily.  Patient does not self-monitor BP.  Patient reports no medication side effects.      Hyperlipidemia: Current therapy includes rosuvastatin 20mg. Patient denies muscle pains or other side effects.    Venous Stasis:  He uses Lac-Hydrin cream on his legs daily and mupirocin cream as needed for wounds, which he finds very effective.  He denies side effects.    Supplements: Current supplements include Vitamin D (he takes varying doses, but up to 5000 IU daily) and vitamin B-12 2000mcg daily.  He denies side effects.  Vitamin B12 level 5/17/17 = 1108pg/mL.  Vitamin D Deficiency Screening Results:  Lab Results   Component Value Date    VITDT 69 12/28/2017    VITDT 67 12/12/2013      Today's Vitals: /75   Pulse 105   Wt 266 lb (120.7 kg)   BMI 40.45 kg/m      ASSESSMENT:              Current medications were reviewed today as discussed above.      Medication Adherence: good, no issues identified    Diabetes: Needs Improvement. Patient is not meeting A1c goal of < 7%.  I agree VGo is a nice option for Te as his biggest barrier with mealtime insulin use has been carrying a pen with him; however the highest dose of VGo delivers 40 units of basal insulin per day, which likely won't be enough for him.    Hypertension/A. Fib: Stable. Patient is meeting BP goal of < 140/90mmHg.      Hyperlipidemia: Stable. Pt is on high intensity statin which is indicated based on 2013 ACC/AHA guidelines for lipid management.       Venous Stasis: Stable.    Supplements: Stable.     PLAN:                  1.  Call [employer] to see if the V-go device is covered for you.  This is a medical device.  I'll connect with you following your diabetes education visit to see if anything has changed.    I  spent 30 minutes with this patient today.  A copy of the visit note was provided to the patient's primary care provider.     Will follow up following his upcoming CDE appointment.    The patient was given a summary of these recommendations as an after visit summary.    Tosin Gillette PharmD, [facility]  Medication Therapy Management Provider  Pager: [phone]

## 2019-03-28 DIAGNOSIS — I10 ESSENTIAL HYPERTENSION: ICD-10-CM

## 2019-03-30 NOTE — TELEPHONE ENCOUNTER
"Last Written Prescription Date:  2/05/18  Last Fill Quantity: 180 capsule,  # refills: 3   Last office visit: 8/22/2018 with prescribing provider:  Julio   Future Office Visit:      Requested Prescriptions   Pending Prescriptions Disp Refills     hydrochlorothiazide (MICROZIDE) 12.5 MG capsule [Pharmacy Med Name: hydroCHLOROthiazide Oral Capsule 12.5 MG] 180 capsule 2     Sig: Take 2 capsules (25 mg) by mouth daily    Diuretics (Including Combos) Protocol Failed - 3/28/2019  6:31 PM       Failed - Normal serum creatinine on file in past 12 months    Recent Labs   Lab Test 12/28/17  1216   CR 1.29*             Failed - Normal serum potassium on file in past 12 months    Recent Labs   Lab Test 12/28/17  1216   POTASSIUM 4.2                   Failed - Normal serum sodium on file in past 12 months    Recent Labs   Lab Test 12/28/17  1216                Passed - Blood pressure under 140/90 in past 12 months    BP Readings from Last 3 Encounters:   03/07/19 124/75   01/25/19 120/68   08/22/18 118/59                Passed - Recent (12 mo) or future (30 days) visit within the authorizing provider's specialty    Patient had office visit in the last 12 months or has a visit in the next 30 days with authorizing provider or within the authorizing provider's specialty.  See \"Patient Info\" tab in inbasket, or \"Choose Columns\" in Meds & Orders section of the refill encounter.             Passed - Medication is active on med list       Passed - Patient is age 18 or older          "

## 2019-04-01 RX ORDER — HYDROCHLOROTHIAZIDE 12.5 MG/1
25 CAPSULE ORAL DAILY
Qty: 180 CAPSULE | Refills: 1 | Status: SHIPPED | OUTPATIENT
Start: 2019-04-01 | End: 2019-10-05

## 2019-04-01 NOTE — TELEPHONE ENCOUNTER
Routing refill request to provider for review/approval because:  Labs not current:  SARI Christianson, Na is from 2017.    KHADIJAH OrozcoN, RN  [employer] [facility]

## 2019-04-02 ENCOUNTER — TRANSFERRED RECORDS (OUTPATIENT)
Dept: HEALTH INFORMATION MANAGEMENT | Facility: CLINIC | Age: 69
End: 2019-04-02

## 2019-04-02 LAB — RETINOPATHY: POSITIVE

## 2019-04-12 DIAGNOSIS — G47.33 OSA (OBSTRUCTIVE SLEEP APNEA): Primary | ICD-10-CM

## 2019-04-27 DIAGNOSIS — I48.19 PERSISTENT ATRIAL FIBRILLATION (H): ICD-10-CM

## 2019-04-27 NOTE — TELEPHONE ENCOUNTER
"Last Written Prescription Date:  9/17/18  Last Fill Quantity: 90 tablet,  # refills: 1   Last office visit: 8/22/2018 with prescribing provider:  Julio   Future Office Visit:      Requested Prescriptions   Pending Prescriptions Disp Refills     metoprolol succinate ER (TOPROL-XL) 50 MG 24 hr tablet [Pharmacy Med Name: Metoprolol Succinate ER Oral Tablet Extended Release 24 Hour 50 MG] 90 tablet 0     Sig: TAKE ONE TABLET BY MOUTH ONE TIME DAILY       Beta-Blockers Protocol Passed - 4/27/2019 12:20 PM        Passed - Blood pressure under 140/90 in past 12 months     BP Readings from Last 3 Encounters:   03/07/19 124/75   01/25/19 120/68   08/22/18 118/59                 Passed - Patient is age 6 or older        Passed - Recent (12 mo) or future (30 days) visit within the authorizing provider's specialty     Patient had office visit in the last 12 months or has a visit in the next 30 days with authorizing provider or within the authorizing provider's specialty.  See \"Patient Info\" tab in inbasket, or \"Choose Columns\" in Meds & Orders section of the refill encounter.              Passed - Medication is active on med list          "

## 2019-04-29 RX ORDER — METOPROLOL SUCCINATE 50 MG/1
TABLET, EXTENDED RELEASE ORAL
Qty: 90 TABLET | Refills: 0 | Status: SHIPPED | OUTPATIENT
Start: 2019-04-29 | End: 2019-07-31

## 2019-05-06 ENCOUNTER — TELEPHONE (OUTPATIENT)
Dept: FAMILY MEDICINE | Facility: CLINIC | Age: 69
End: 2019-05-06

## 2019-05-06 NOTE — TELEPHONE ENCOUNTER
Reason for Call:  Medication:    Do you use a [facility]?  Name of the pharmacy and phone number for the current request:      Name of the medication requested: XARELTO 20 MG TABS tablet    Other request: Claudette from [facility] in [place] had sent faxes for medical cee for the pt who is supposed to be having dental surgery. They received a partial fax back on the 30th with the medical records and labs.     They need the authorization to stop Xarelto for 2 days prior to surgery. Please call and give verbal and also send fax (#[phone]).     Can we leave a detailed message on this number? Not Applicable    Phone number can be reached at: [phone]    Best Time: any    Call taken on 5/6/2019 at 2:38 PM by Rd Chambers

## 2019-05-07 NOTE — TELEPHONE ENCOUNTER
Pt called to check on status of stopping Xarelto medication in preparation for dental surgery.  Te ph# [phone].

## 2019-05-12 DIAGNOSIS — E11.9 TYPE 2 DIABETES MELLITUS WITHOUT COMPLICATION, WITH LONG-TERM CURRENT USE OF INSULIN (H): ICD-10-CM

## 2019-05-12 DIAGNOSIS — Z79.4 TYPE 2 DIABETES MELLITUS WITHOUT COMPLICATION, WITH LONG-TERM CURRENT USE OF INSULIN (H): ICD-10-CM

## 2019-05-13 NOTE — TELEPHONE ENCOUNTER
"metFORMIN (GLUCOPHAGE) 1000 MG tablet 180 tablet 1 9/17/2018  No   Sig: TAKE ONE TABLET BY MOUTH TWICE DAILY WITH MEALS     Last Written Prescription Date:  09/17/2018  Last Fill Quantity: 180,  # refills: 1   Last office visit: 8/22/2018 with prescribing provider:     Future Office Visit:      Requested Prescriptions   Pending Prescriptions Disp Refills     metFORMIN (GLUCOPHAGE) 1000 MG tablet [Pharmacy Med Name: metFORMIN HCl Oral Tablet 1000 MG] 180 tablet 0     Sig: TAKE ONE TABLET BY MOUTH TWICE DAILY WITH MEALS       Biguanide Agents Failed - 5/13/2019 11:15 AM        Failed - Patient has documented LDL within the past 12 mos.     Recent Labs   Lab Test 10/12/17   LDL 77             Failed - Patient has had a Microalbumin in the past 15 mos.     Recent Labs   Lab Test 12/28/17  1230  12/16/15   MICROALB  --   --  3.0   MICROL 39   < >  --    UMALCR 41.34*   < > 23    < > = values in this interval not displayed.             Failed - Patient has documented A1c within the specified period of time.     If HgbA1C is 8 or greater, it needs to be on file within the past 3 months.  If less than 8, must be on file within the past 6 months.     Recent Labs   Lab Test 02/05/18  1113   A1C 7.3*             Failed - Patient's CR is NOT>1.4 OR Patient's EGFR is NOT<45 within past 12 mos.     Recent Labs   Lab Test 12/28/17  1216   GFRESTIMATED 56*   GFRESTBLACK 67       Recent Labs   Lab Test 12/28/17  1216   CR 1.29*             Failed - Recent (6 mo) or future (30 days) visit within the authorizing provider's specialty     Patient had office visit in the last 6 months or has a visit in the next 30 days with authorizing provider or within the authorizing provider's specialty.  See \"Patient Info\" tab in inbasket, or \"Choose Columns\" in Meds & Orders section of the refill encounter.            Passed - Blood pressure less than 140/90 in past 6 months     BP Readings from Last 3 Encounters:   03/07/19 124/75   01/25/19 " 120/68   08/22/18 118/59                 Passed - Patient is age 10 or older        Passed - Patient does NOT have a diagnosis of CHF.        Passed - Medication is active on med list

## 2019-05-13 NOTE — TELEPHONE ENCOUNTER
Claudette is still needing info regarding stopping rx before surgery. There is no date for oral surgery . Please call back asap

## 2019-05-14 NOTE — TELEPHONE ENCOUNTER
Routing refill request to provider for review/approval because:  Labs out of range:    Labs not current:    Patient needs to be seen because:  Last diabetes visit 8/2018.  [employer] message sent to pt.    Please authorize if appropriate.  Thanks,  Kaela Pappas RN

## 2019-05-21 NOTE — TELEPHONE ENCOUNTER
Faxed signed form to [facility] at [phone]. Confirmed received with Claudette. Patient notified.  Halle Cornejo CMA on 5/21/2019 at 10:51 AM

## 2019-05-22 DIAGNOSIS — I48.19 PERSISTENT ATRIAL FIBRILLATION (H): ICD-10-CM

## 2019-05-22 DIAGNOSIS — I25.10 CORONARY ARTERY CALCIFICATION: ICD-10-CM

## 2019-05-22 DIAGNOSIS — I48.91 ATRIAL FIBRILLATION, UNSPECIFIED TYPE (H): ICD-10-CM

## 2019-05-22 NOTE — TELEPHONE ENCOUNTER
"metoprolol succinate ER (TOPROL-XL) 50 MG 24 hr tablet  Last Written Prescription Date:  4/29/19  Last Fill Quantity: 90,  # refills: 0   Last office visit: 8/22/2018 with prescribing provider:  Brunkow    Future Office Visit:      XARELTO 20 MG TABS tablet  Last Written Prescription Date:  11/2/18  Last Fill Quantity: 90,  # refills: 1   Last office visit: 8/22/2018 with prescribing provider:  Brunkow    Future Office Visit:          Requested Prescriptions   Pending Prescriptions Disp Refills     metoprolol succinate ER (TOPROL-XL) 50 MG 24 hr tablet [Pharmacy Med Name: Metoprolol Succinate ER Oral Tablet Extended Release 24 Hour 50 MG] 90 tablet 0     Sig: TAKE ONE TABLET BY MOUTH ONE TIME DAILY       Beta-Blockers Protocol Passed - 5/22/2019 11:10 AM        Passed - Blood pressure under 140/90 in past 12 months     BP Readings from Last 3 Encounters:   03/07/19 124/75   01/25/19 120/68   08/22/18 118/59                 Passed - Patient is age 6 or older        Passed - Recent (12 mo) or future (30 days) visit within the authorizing provider's specialty     Patient had office visit in the last 12 months or has a visit in the next 30 days with authorizing provider or within the authorizing provider's specialty.  See \"Patient Info\" tab in inbasket, or \"Choose Columns\" in Meds & Orders section of the refill encounter.              Passed - Medication is active on med list        XARELTO 20 MG TABS tablet [Pharmacy Med Name: Xarelto Oral Tablet 20 MG] 30 tablet 0     Sig: TAKE ONE TABLET BY MOUTH ONE TIME DAILY WITH DINNER       Direct Oral Anticoagulant Agents Failed - 5/22/2019 11:10 AM        Failed - Normal Platelets on file in past 12 months     Recent Labs   Lab Test 12/28/17  1216                  Failed - Creatinine Clearance greater than 50 ml/min on file in past 3 mos     No lab results found.          Failed - Serum creatinine less than or equal to 1.4 on file in past 3 mos     Recent Labs   Lab " Test 12/28/17  1216   CR 1.29*             Failed - Recent (6 mo) or future (30 days) visit within the authorizing provider's specialty        Passed - Medication is active on med list        Passed - Patient is 18 years of age or older

## 2019-05-23 RX ORDER — RIVAROXABAN 20 MG/1
TABLET, FILM COATED ORAL
Qty: 30 TABLET | Refills: 0 | Status: SHIPPED | OUTPATIENT
Start: 2019-05-23 | End: 2019-06-29

## 2019-05-23 RX ORDER — METOPROLOL SUCCINATE 50 MG/1
TABLET, EXTENDED RELEASE ORAL
Start: 2019-05-23

## 2019-05-23 RX ORDER — ROSUVASTATIN CALCIUM 20 MG/1
20 TABLET, COATED ORAL DAILY
Qty: 90 TABLET | Refills: 0 | Status: SHIPPED | OUTPATIENT
Start: 2019-05-23 | End: 2019-08-16

## 2019-05-23 NOTE — TELEPHONE ENCOUNTER
Metoprolol:   Denied  Note to pharmacy:  Too early; Rx sent 4/29/2019 for 90 days    Xarelto:   Routing refill request to provider for review/approval because:  Labs out of range:  Creatinine   Labs not current:  Platelets, Creatinine, and Creatinine Clearance needed yearly    Trina GUO RN

## 2019-05-23 NOTE — TELEPHONE ENCOUNTER
Routing refill request to provider for review/approval because:  Labs out of range:  Creatinine kinase  Labs not current:  Lipid panel needed yearly  Trina GUO RN

## 2019-05-23 NOTE — TELEPHONE ENCOUNTER
"rosuvastatin (CRESTOR) 20 MG tablet  Last Written Prescription Date:  2/11/19  Last Fill Quantity: 90,  # refills: 0   Last office visit: 8/22/2018 with prescribing provider:  toi    Future Office Visit:        Requested Prescriptions   Pending Prescriptions Disp Refills     rosuvastatin (CRESTOR) 20 MG tablet [Pharmacy Med Name: Rosuvastatin Calcium Oral Tablet 20 MG] 90 tablet 0     Sig: Take 1 tablet (20 mg) by mouth daily       Statins Protocol Failed - 5/22/2019  7:00 PM        Failed - LDL on file in past 12 months     Recent Labs   Lab Test 10/12/17   LDL 77             Passed - No abnormal creatine kinase in past 12 months     Recent Labs   Lab Test 11/15/17  1138   *                Passed - Recent (12 mo) or future (30 days) visit within the authorizing provider's specialty     Patient had office visit in the last 12 months or has a visit in the next 30 days with authorizing provider or within the authorizing provider's specialty.  See \"Patient Info\" tab in inbasket, or \"Choose Columns\" in Meds & Orders section of the refill encounter.              Passed - Medication is active on med list        Passed - Patient is age 18 or older          "

## 2019-06-03 ENCOUNTER — OFFICE VISIT (OUTPATIENT)
Dept: PHARMACY | Facility: CLINIC | Age: 69
End: 2019-06-03

## 2019-06-03 VITALS
SYSTOLIC BLOOD PRESSURE: 123 MMHG | DIASTOLIC BLOOD PRESSURE: 71 MMHG | HEART RATE: 76 BPM | WEIGHT: 257.2 LBS | BODY MASS INDEX: 39.11 KG/M2

## 2019-06-03 DIAGNOSIS — I48.91 ATRIAL FIBRILLATION, UNSPECIFIED TYPE (H): ICD-10-CM

## 2019-06-03 DIAGNOSIS — L97.909 VENOUS STASIS ULCER, UNSPECIFIED SITE, UNSPECIFIED ULCER STAGE, UNSPECIFIED WHETHER VARICOSE VEINS PRESENT (H): ICD-10-CM

## 2019-06-03 DIAGNOSIS — E11.21 TYPE 2 DIABETES MELLITUS WITH DIABETIC NEPHROPATHY, WITH LONG-TERM CURRENT USE OF INSULIN (H): Primary | ICD-10-CM

## 2019-06-03 DIAGNOSIS — Z79.4 TYPE 2 DIABETES MELLITUS WITH DIABETIC NEPHROPATHY, WITH LONG-TERM CURRENT USE OF INSULIN (H): Primary | ICD-10-CM

## 2019-06-03 DIAGNOSIS — I83.009 VENOUS STASIS ULCER, UNSPECIFIED SITE, UNSPECIFIED ULCER STAGE, UNSPECIFIED WHETHER VARICOSE VEINS PRESENT (H): ICD-10-CM

## 2019-06-03 DIAGNOSIS — E63.9 NUTRITIONAL DISORDER: ICD-10-CM

## 2019-06-03 DIAGNOSIS — I10 ESSENTIAL HYPERTENSION: ICD-10-CM

## 2019-06-03 DIAGNOSIS — Z79.4 TYPE 2 DIABETES MELLITUS WITH DIABETIC NEPHROPATHY, WITH LONG-TERM CURRENT USE OF INSULIN (H): ICD-10-CM

## 2019-06-03 DIAGNOSIS — E78.5 HYPERLIPIDEMIA LDL GOAL <100: ICD-10-CM

## 2019-06-03 DIAGNOSIS — E11.21 TYPE 2 DIABETES MELLITUS WITH DIABETIC NEPHROPATHY, WITH LONG-TERM CURRENT USE OF INSULIN (H): ICD-10-CM

## 2019-06-03 LAB — HBA1C MFR BLD: 7.5 % (ref 0–5.6)

## 2019-06-03 PROCEDURE — 80061 LIPID PANEL: CPT | Performed by: INTERNAL MEDICINE

## 2019-06-03 PROCEDURE — 99607 MTMS BY PHARM ADDL 15 MIN: CPT | Performed by: PHARMACIST

## 2019-06-03 PROCEDURE — 99606 MTMS BY PHARM EST 15 MIN: CPT | Performed by: PHARMACIST

## 2019-06-03 PROCEDURE — 36415 COLL VENOUS BLD VENIPUNCTURE: CPT | Performed by: INTERNAL MEDICINE

## 2019-06-03 PROCEDURE — 80048 BASIC METABOLIC PNL TOTAL CA: CPT | Performed by: INTERNAL MEDICINE

## 2019-06-03 PROCEDURE — 82043 UR ALBUMIN QUANTITATIVE: CPT | Performed by: INTERNAL MEDICINE

## 2019-06-03 PROCEDURE — 83036 HEMOGLOBIN GLYCOSYLATED A1C: CPT | Performed by: INTERNAL MEDICINE

## 2019-06-03 RX ORDER — GLIPIZIDE 5 MG/1
5 TABLET, FILM COATED, EXTENDED RELEASE ORAL DAILY
COMMUNITY
End: 2021-05-12

## 2019-06-03 NOTE — PATIENT INSTRUCTIONS
Recommendations from today's MTM visit:                                                    MTM (medication therapy management) is a service provided by a clinical pharmacist designed to help you get the most of out of your medicines.   Today we reviewed what your medicines are for, how to know if they are working, that your medicines are safe and how to make your medicine regimen as easy as possible.     1.  Continue using VGo device, take additional Novolog via pen if needed for dinner (about 14 units if taking right before your meal).    2.  Omnipod may also be an option for you with less limitations on dosing.  Talk with Shital about this.    3.  Make an appointment to see Dr. Kim in August (or sooner if you'd like).    4.  Labs today - A1c, kidney function/electrolytes, protein in the urine, and cholesterol    Next MTM visit:  Pending lab results    To schedule another MTM appointment, please call the clinic directly or you may call the MTM scheduling line at [phone] or toll-free at [phone].     My Clinical Pharmacist's contact information:                                                      It was a pleasure talking with you today!  Please feel free to contact me with any questions or concerns you have.      Tosin Gillette, [place], [facility]  Medication Therapy Management Provider  Pager: [phone]     You may receive a survey about the MTM services you received by email and/or US Mail.  I would appreciate your feedback to help me serve you better in the future. Your comments will be anonymous.

## 2019-06-03 NOTE — PROGRESS NOTES
SUBJECTIVE/OBJECTIVE:                Te Yoder is a 68 year old male coming in for a follow-up visit for Medication Therapy Management.  He was referred to me from Dr. Brown, has since established care with Dr. Kim.     Chief Complaint: Follow up from our visit on 3/7/19.  He's here to f/up on diabetes management.    Tobacco: No tobacco use  Alcohol: 1-3 beverages / week    Medication Adherence/Access:  no issues reported    Diabetes:  He continues working closely with Dr. Arnold (will be seeing him again in June) as well as diabetes education through the endo clinic and has been set up with the personal [employer] [facility], last endo visit was in February and he reports A1c was 8%.  Pt currently taking Jardiance 10mg daily, glipizide XL 5mg daily (recently changed from 10mg), metformin 1000mg BID and Humalog via VGo 40 device (7-8 clicks administering 14-16 units of Humalog with meals).  He's noticed he's running out of clicks by the end of the day.  Overall, he likes the convenience of the VGo device, just doesn't feel it contains enough insulin for his needs during the day.  Pt is not experiencing side effects.    SMBG: Patient uses [facility].       Symptoms of low blood sugar? Sweaty, shaky. Frequency of hypoglycemia? None recently  Recent symptoms of high blood sugar? Neuropathy.  Eye exam: up to date   Foot exam: up to date  Microalbumin is not < 30 mg/g. Pt is taking an ACEi/ARB.  Aspirin: Not taking due to Xarelto use  Diet/Exercise:  He's been working again for the last 45 days so activity levels have been higher.  Lab Results   Component Value Date    A1C 7.3 02/05/2018    A1C 7.0 10/18/2017    A1C 7.1 10/12/2017    A1C 6.8 05/17/2017    A1C 6.8 03/08/2017     Hypertension/A. Fib: Current medications include HCTZ 25mg daily, lisionpril 20mg BID, metoprolol XL 50mg daily and Xarelto 20mg daily.  Patient does not self-monitor BP.  Patient reports no medication side effects.      Hyperlipidemia:  "Current therapy includes rosuvastatin 20mg. Patient denies muscle pains or other side effects.    Venous Stasis:  He uses Lac-Hydrin cream on his legs daily and mupirocin cream as needed for wounds, which he finds very effective.  He denies side effects.    Supplements: Current supplements include Vitamin D (he takes varying doses, but up to 5000 IU daily) and vitamin B-12 2000mcg daily.  He denies side effects.  Vitamin B12 level 5/17/17 = 1108pg/mL.  Vitamin D Deficiency Screening Results:  Lab Results   Component Value Date    VITDT 69 12/28/2017    VITDT 67 12/12/2013     Today's Vitals: /71   Pulse 76   Wt 257 lb 3.2 oz (116.7 kg)   BMI 39.11 kg/m      ASSESSMENT:              Current medications were reviewed today as discussed above.      Medication Adherence: good, no issues identified    Diabetes: Needs Improvement. Patient is not meeting A1c goal of < 7%, due for A1c re-check.  May need to use Novolog pen to cover evening meal as he's running out \"clicks\" before then.  Also due for microalbumin and BMP.  Omnipod may also be an option for him, he'll discuss further with CDE.    Hypertension/A. Fib: Stable. Patient is meeting BP goal of < 140/90mmHg.      Hyperlipidemia: Stable. Pt is on high intensity statin which is indicated based on 2013 ACC/AHA guidelines for lipid management.  Due for lipid panel.    Venous Stasis: Stable.    Supplements: Stable.     PLAN:                  1.  Continue using VGo device, take additional Novolog via pen if needed for dinner (about 14 units if taking right before your meal).  2.  Omnipod may also be an option for you with less limitations on dosing.  Talk with Shital about this.  3.  Make an appointment to see Dr. Kim in August (or sooner if you'd like).  4.  Labs today - A1c, BMP, microalbumin and lipids.    I spent 45 minutes with this patient today. All changes were made via collaborative practice agreement with Dr. Kim. A copy of the visit note was " provided to the patient's primary care provider.     Will follow up pending lab results.    The patient was given a summary of these recommendations as an after visit summary.    Lainey YañezD, [facility]  Medication Therapy Management Provider  Pager: [phone]

## 2019-06-04 LAB
ANION GAP SERPL CALCULATED.3IONS-SCNC: 5 MMOL/L (ref 3–14)
BUN SERPL-MCNC: 20 MG/DL (ref 7–30)
CALCIUM SERPL-MCNC: 8.9 MG/DL (ref 8.5–10.1)
CHLORIDE SERPL-SCNC: 108 MMOL/L (ref 94–109)
CHOLEST SERPL-MCNC: 113 MG/DL
CO2 SERPL-SCNC: 27 MMOL/L (ref 20–32)
CREAT SERPL-MCNC: 1.1 MG/DL (ref 0.66–1.25)
CREAT UR-MCNC: 55 MG/DL
GFR SERPL CREATININE-BSD FRML MDRD: 68 ML/MIN/{1.73_M2}
GLUCOSE SERPL-MCNC: 81 MG/DL (ref 70–99)
HDLC SERPL-MCNC: 31 MG/DL
LDLC SERPL CALC-MCNC: 60 MG/DL
MICROALBUMIN UR-MCNC: 54 MG/L
MICROALBUMIN/CREAT UR: 97.28 MG/G CR (ref 0–17)
NONHDLC SERPL-MCNC: 82 MG/DL
POTASSIUM SERPL-SCNC: 3.9 MMOL/L (ref 3.4–5.3)
SODIUM SERPL-SCNC: 140 MMOL/L (ref 133–144)
TRIGL SERPL-MCNC: 112 MG/DL

## 2019-06-26 DIAGNOSIS — I48.91 ATRIAL FIBRILLATION, UNSPECIFIED TYPE (H): ICD-10-CM

## 2019-06-27 NOTE — TELEPHONE ENCOUNTER
Last Written Prescription Date:  5/23/19  Last Fill Quantity: 30 tablet,  # refills: 0   Last office visit: 8/22/2018 with prescribing provider:  Julio   Future Office Visit:   Next 5 appointments (look out 90 days)    Jul 03, 2019  4:30 PM CDT  Office Visit with Delta Kim MD  [facility] ([facility]) [address] [facility] [id number] [phone]         Requested Prescriptions   Pending Prescriptions Disp Refills     rivaroxaban ANTICOAGULANT (XARELTO) 20 MG TABS tablet 30 tablet 0       Direct Oral Anticoagulant Agents Failed - 6/26/2019  8:21 PM        Failed - Normal Platelets on file in past 12 months     Recent Labs   Lab Test 12/28/17  1216                  Passed - Medication is active on med list        Passed - Creatinine Clearance greater than 50 ml/min on file in past 3 mos     No lab results found.          Passed - Serum creatinine less than or equal to 1.4 on file in past 3 mos     Recent Labs   Lab Test 06/03/19  1605   CR 1.10             Passed - Patient is 18 years of age or older        Passed - Recent (6 mo) or future (30 days) visit within the authorizing provider's specialty

## 2019-06-29 DIAGNOSIS — I48.91 ATRIAL FIBRILLATION, UNSPECIFIED TYPE (H): ICD-10-CM

## 2019-06-29 NOTE — TELEPHONE ENCOUNTER
Medication filled 1 time as pt is due for a follow-up in clinic. Patient is already scheduled for upcoming appointment.

## 2019-07-01 NOTE — TELEPHONE ENCOUNTER
Xarelto      Last Written Prescription Date:  6/29/19  Last Fill Quantity: 30,   # refills: 0  Last Office Visit: 8/22/18  Future Office visit:    Next 5 appointments (look out 90 days)    Jul 03, 2019  4:30 PM CDT  Office Visit with Delta Kim MD  [facility] ([facility]) [address] [facility] [address]  [phone]         Requested Prescriptions   Pending Prescriptions Disp Refills     XARELTO 20 MG TABS tablet [Pharmacy Med Name: Xarelto Oral Tablet 20 MG] 30 tablet 0     Sig: TAKE ONE TABLET BY MOUTH ONE TIME DAILY WITH DINNER       Direct Oral Anticoagulant Agents Failed - 6/29/2019 11:02 AM        Failed - Normal Platelets on file in past 12 months     Recent Labs   Lab Test 12/28/17  1216                  Passed - Medication is active on med list        Passed - Creatinine Clearance greater than 50 ml/min on file in past 3 mos     No lab results found.          Passed - Serum creatinine less than or equal to 1.4 on file in past 3 mos     Recent Labs   Lab Test 06/03/19  1605   CR 1.10             Passed - Patient is 18 years of age or older        Passed - Recent (6 mo) or future (30 days) visit within the authorizing provider's specialty          Routing refill request to provider for review/approval because:  Labs not current:  Platelet    Please review and authorize if appropriate.    Thank you,   Axel ABDI RN

## 2019-07-02 RX ORDER — RIVAROXABAN 20 MG/1
TABLET, FILM COATED ORAL
Qty: 90 TABLET | Refills: 0 | Status: SHIPPED | OUTPATIENT
Start: 2019-07-02 | End: 2019-10-28

## 2019-07-03 ENCOUNTER — OFFICE VISIT (OUTPATIENT)
Dept: FAMILY MEDICINE | Facility: CLINIC | Age: 69
End: 2019-07-03
Payer: COMMERCIAL

## 2019-07-03 VITALS
TEMPERATURE: 98.3 F | SYSTOLIC BLOOD PRESSURE: 111 MMHG | HEIGHT: 68 IN | WEIGHT: 252.5 LBS | OXYGEN SATURATION: 94 % | BODY MASS INDEX: 38.27 KG/M2 | DIASTOLIC BLOOD PRESSURE: 74 MMHG | HEART RATE: 109 BPM

## 2019-07-03 DIAGNOSIS — G47.33 OSA (OBSTRUCTIVE SLEEP APNEA): ICD-10-CM

## 2019-07-03 DIAGNOSIS — E66.01 MORBID OBESITY DUE TO EXCESS CALORIES (H): ICD-10-CM

## 2019-07-03 DIAGNOSIS — G62.9 PERIPHERAL POLYNEUROPATHY: ICD-10-CM

## 2019-07-03 DIAGNOSIS — Z79.4 TYPE 2 DIABETES MELLITUS WITHOUT COMPLICATION, WITH LONG-TERM CURRENT USE OF INSULIN (H): Primary | ICD-10-CM

## 2019-07-03 DIAGNOSIS — E78.5 HYPERLIPIDEMIA LDL GOAL <100: ICD-10-CM

## 2019-07-03 DIAGNOSIS — E11.9 TYPE 2 DIABETES MELLITUS WITHOUT COMPLICATION, WITH LONG-TERM CURRENT USE OF INSULIN (H): Primary | ICD-10-CM

## 2019-07-03 DIAGNOSIS — I10 ESSENTIAL HYPERTENSION: ICD-10-CM

## 2019-07-03 DIAGNOSIS — I48.19 PERSISTENT ATRIAL FIBRILLATION (H): ICD-10-CM

## 2019-07-03 PROCEDURE — 99207 C PAF COMPLETED  NO CHARGE: CPT | Performed by: INTERNAL MEDICINE

## 2019-07-03 PROCEDURE — 99214 OFFICE O/P EST MOD 30 MIN: CPT | Performed by: INTERNAL MEDICINE

## 2019-07-03 ASSESSMENT — MIFFLIN-ST. JEOR: SCORE: 1889.83

## 2019-07-12 ENCOUNTER — TELEPHONE (OUTPATIENT)
Dept: FAMILY MEDICINE | Facility: CLINIC | Age: 69
End: 2019-07-12

## 2019-07-12 DIAGNOSIS — Z79.4 TYPE 2 DIABETES MELLITUS WITHOUT COMPLICATION, WITH LONG-TERM CURRENT USE OF INSULIN (H): Primary | ICD-10-CM

## 2019-07-12 DIAGNOSIS — E11.9 TYPE 2 DIABETES MELLITUS WITHOUT COMPLICATION, WITH LONG-TERM CURRENT USE OF INSULIN (H): Primary | ICD-10-CM

## 2019-07-12 NOTE — TELEPHONE ENCOUNTER
Called patient: No answer, left detailed message that Chantel recommends Diabetes Education Referral if patient agrees.     Pt Reps: if Pt agrees please note this in message.     Can be routed to Chantel- Diabetes Education Referral is pended in this encounter (requires completion/signing)     Thank you!  Krissy GRACIA RN

## 2019-07-12 NOTE — TELEPHONE ENCOUNTER
Is he wanting a referral? Diabetes educator would probably be best because they are dietitians. Can order it pt agrees     KATHARINE Frias CNP

## 2019-07-12 NOTE — TELEPHONE ENCOUNTER
Reason for Call:  Other call back    Detailed comments: Patient would like a call regarding Dr. Kim mentioning pt to see a   dietitian    Patient would like a call back    Phone Number Patient can be reached at: Home number on file [phone] (home)    Best Time: anytime    Can we leave a detailed message on this number? YES    Call taken on 7/12/2019 at 12:04 PM by Olivia Sarmiento

## 2019-07-17 NOTE — TELEPHONE ENCOUNTER
Pt called back - states he has an endocrinologist already and sees a diabetes educator there. States it was regarding a referral for carb counting but pt plans to just follow up with his endo office regarding this     If anything further needed from us he will call    Dorothy Harvey RN on 7/17/2019 at 11:29 AM

## 2019-07-31 DIAGNOSIS — I48.19 PERSISTENT ATRIAL FIBRILLATION (H): ICD-10-CM

## 2019-07-31 NOTE — TELEPHONE ENCOUNTER
"    metoprolol succinate ER (TOPROL-XL) 50 MG 24 hr tablet 90 tablet 0 4/29/2019       Last Written Prescription Date:  04/29/2019  Last Fill Quantity: 90,  # refills: 0   Last office visit: 7/3/2019 with prescribing provider:     Future Office Visit:  Unknown    Requested Prescriptions   Pending Prescriptions Disp Refills     metoprolol succinate ER (TOPROL-XL) 50 MG 24 hr tablet [Pharmacy Med Name: Metoprolol Succinate ER Oral Tablet Extended Release 24 Hour 50 MG] 90 tablet 0     Sig: TAKE ONE TABLET BY MOUTH ONE TIME DAILY       Beta-Blockers Protocol Passed - 7/31/2019 12:12 PM        Passed - Blood pressure under 140/90 in past 12 months     BP Readings from Last 3 Encounters:   07/03/19 111/74   06/03/19 123/71   03/07/19 124/75                 Passed - Patient is age 6 or older        Passed - Recent (12 mo) or future (30 days) visit within the authorizing provider's specialty     Patient had office visit in the last 12 months or has a visit in the next 30 days with authorizing provider or within the authorizing provider's specialty.  See \"Patient Info\" tab in inbasket, or \"Choose Columns\" in Meds & Orders section of the refill encounter.              Passed - Medication is active on med list          "

## 2019-08-01 RX ORDER — METOPROLOL SUCCINATE 50 MG/1
TABLET, EXTENDED RELEASE ORAL
Qty: 90 TABLET | Refills: 1 | Status: SHIPPED | OUTPATIENT
Start: 2019-08-01 | End: 2019-12-23

## 2019-08-16 DIAGNOSIS — I25.10 CORONARY ARTERY CALCIFICATION: ICD-10-CM

## 2019-08-16 RX ORDER — ROSUVASTATIN CALCIUM 20 MG/1
20 TABLET, COATED ORAL DAILY
Qty: 90 TABLET | Refills: 3 | Status: SHIPPED | OUTPATIENT
Start: 2019-08-16 | End: 2020-07-15

## 2019-08-16 NOTE — TELEPHONE ENCOUNTER
"  rosuvastatin (CRESTOR) 20 MG tablet  Last Written Prescription Date:  5/23/19  Last Fill Quantity: 90,  # refills: 0   Last office visit: 7/3/2019 with prescribing provider: Julio   Future Office Visit:      Routing refill request to provider for review/approval because:  Labs not current:  Creatinine Kinase    Would you like pt to come for Lab Only? Had recent OV     Please review and authorize if appropriate,     Thank you,   Krissy CARBAJAL RN                 Requested Prescriptions   Pending Prescriptions Disp Refills     rosuvastatin (CRESTOR) 20 MG tablet [Pharmacy Med Name: Rosuvastatin Calcium Oral Tablet 20 MG] 90 tablet 0     Sig: Take 1 tablet (20 mg) by mouth daily.       Statins Protocol Passed - 8/16/2019  2:51 PM        Passed - LDL on file in past 12 months     Recent Labs   Lab Test 06/03/19  1605   LDL 60             Passed - No abnormal creatine kinase in past 12 months     Recent Labs   Lab Test 11/15/17  1138   *                Passed - Recent (12 mo) or future (30 days) visit within the authorizing provider's specialty     Patient had office visit in the last 12 months or has a visit in the next 30 days with authorizing provider or within the authorizing provider's specialty.  See \"Patient Info\" tab in inbasket, or \"Choose Columns\" in Meds & Orders section of the refill encounter.              Passed - Medication is active on med list        Passed - Patient is age 18 or older          "

## 2019-09-30 ENCOUNTER — HEALTH MAINTENANCE LETTER (OUTPATIENT)
Age: 69
End: 2019-09-30

## 2019-10-01 ENCOUNTER — OFFICE VISIT (OUTPATIENT)
Dept: PHARMACY | Facility: CLINIC | Age: 69
End: 2019-10-01

## 2019-10-01 ENCOUNTER — ALLIED HEALTH/NURSE VISIT (OUTPATIENT)
Dept: NURSING | Facility: CLINIC | Age: 69
End: 2019-10-01
Payer: COMMERCIAL

## 2019-10-01 VITALS
BODY MASS INDEX: 38.77 KG/M2 | SYSTOLIC BLOOD PRESSURE: 130 MMHG | WEIGHT: 255 LBS | HEART RATE: 80 BPM | DIASTOLIC BLOOD PRESSURE: 60 MMHG

## 2019-10-01 DIAGNOSIS — I48.91 ATRIAL FIBRILLATION, UNSPECIFIED TYPE (H): ICD-10-CM

## 2019-10-01 DIAGNOSIS — E78.5 HYPERLIPIDEMIA LDL GOAL <100: ICD-10-CM

## 2019-10-01 DIAGNOSIS — Z79.4 TYPE 2 DIABETES MELLITUS WITH DIABETIC NEPHROPATHY, WITH LONG-TERM CURRENT USE OF INSULIN (H): Primary | ICD-10-CM

## 2019-10-01 DIAGNOSIS — Z23 NEED FOR VACCINATION: ICD-10-CM

## 2019-10-01 DIAGNOSIS — I10 ESSENTIAL HYPERTENSION: ICD-10-CM

## 2019-10-01 DIAGNOSIS — Z23 NEED FOR PROPHYLACTIC VACCINATION AND INOCULATION AGAINST INFLUENZA: Primary | ICD-10-CM

## 2019-10-01 DIAGNOSIS — E63.9 NUTRITIONAL DISORDER: ICD-10-CM

## 2019-10-01 DIAGNOSIS — E11.21 TYPE 2 DIABETES MELLITUS WITH DIABETIC NEPHROPATHY, WITH LONG-TERM CURRENT USE OF INSULIN (H): Primary | ICD-10-CM

## 2019-10-01 PROCEDURE — 99606 MTMS BY PHARM EST 15 MIN: CPT | Performed by: PHARMACIST

## 2019-10-01 PROCEDURE — 90662 IIV NO PRSV INCREASED AG IM: CPT

## 2019-10-01 PROCEDURE — 99607 MTMS BY PHARM ADDL 15 MIN: CPT | Performed by: PHARMACIST

## 2019-10-01 PROCEDURE — 99207 ZZC NO CHARGE NURSE ONLY: CPT

## 2019-10-01 PROCEDURE — G0008 ADMIN INFLUENZA VIRUS VAC: HCPCS

## 2019-10-01 NOTE — PROGRESS NOTES
SUBJECTIVE/OBJECTIVE:                Te Yoder is a 68 year old male coming in for a follow-up visit for Medication Therapy Management.  He was referred to me from Dr. Brown, he has since established care with Dr. Kim.     Chief Complaint: Follow up from our visit on 6/3/19.      Tobacco: No tobacco use  Alcohol: 1-3 beverages / week    Medication Adherence/Access: no issues reported    Diabetes:  He continues working closely with Dr. Arnold as well as diabetes education through the endo clinic and has been set up with the personal [employer].  Pt currently taking Jardiance 10mg daily, glipizide XL 5mg daily (recently changed from 10mg), metformin 1000mg BID and Novolog via VGo 40 device.  Pt is not experiencing side effects.    SMBG: Patient uses [employer].         Symptoms of low blood sugar? Sweaty, shaky. Frequency of hypoglycemia? None recently  Recent symptoms of high blood sugar? Neuropathy.  Eye exam: up to date   Foot exam: due  Microalbumin is not < 30 mg/g. Pt is taking an ACEi/ARB.  Aspirin: Not taking due to Xarelto use  Diet/Exercise:  He's been working again for the last 5-6 months so activity levels have been higher.  Lab Results   Component Value Date    A1C 7.5 06/03/2019    A1C 7.3 02/05/2018    A1C 7.0 10/18/2017    A1C 7.1 10/12/2017    A1C 6.8 05/17/2017     Hypertension/A. Fib: Current medications include HCTZ 25mg daily, lisionpril 20mg BID, metoprolol XL 50mg daily and Xarelto 20mg daily.  Patient does not self-monitor BP.  Patient reports no medication side effects.    BP Readings from Last 3 Encounters:   07/03/19 111/74   06/03/19 123/71   03/07/19 124/75      Hyperlipidemia: Current therapy includes rosuvastatin 20mg. Patient denies muscle pains or other side effects.  LDL Cholesterol Calculated   Date Value Ref Range Status   06/03/2019 60 <100 mg/dL Final     Comment:     Desirable:       <100 mg/dl      Supplements: Current supplements include Vitamin D (he takes  varying doses, but up to 5000 IU daily) and vitamin B-12 2500mcg daily.  He denies side effects.  Vitamin B12 level 5/17/17 = 1108pg/mL.  Vitamin D Deficiency Screening Results:  Lab Results   Component Value Date    VITDT 69 12/28/2017    VITDT 67 12/12/2013     Immunizations:  He has not yet received annual influenza vaccine.    Today's Vitals: /60   Pulse 80   Wt 255 lb (115.7 kg)   BMI 38.77 kg/m      ASSESSMENT:              Current medications were reviewed today as discussed above.      Medication Adherence: good, no issues identified    Diabetes: Stable.  A1c is at goal <8% and SMBG is at goal 90-150mg/dL fasting and <180mg/dL post-prandial.    Hypertension/A. Fib: Stable. Patient is meeting BP goal of < 140/90mmHg.      Hyperlipidemia: Stable. Pt is on high intensity statin which is indicated based on 2013 ACC/AHA guidelines for lipid management.  Due for lipid panel.    Supplements: Stable.    Immunizations: Due for annual influenza vaccine.     PLAN:                  1.  We did your flu shot today.  2.  Overall things look good, keep up the good work.    I spent 45 minutes with this patient today. All changes were made via collaborative practice agreement with Dr. Kim. A copy of the visit note was provided to the patient's primary care provider.     Will follow up in 6 months, sooner if needed.    The patient was given a summary of these recommendations as an after visit summary.    Tosin Gillette, PharmD, BCACP  Medication Therapy Management Provider  Pager: [phone]

## 2019-10-01 NOTE — PATIENT INSTRUCTIONS
Recommendations from today's MTM visit:                                                    MTM (medication therapy management) is a service provided by a clinical pharmacist designed to help you get the most of out of your medicines.   Today we reviewed what your medicines are for, how to know if they are working, that your medicines are safe and how to make your medicine regimen as easy as possible.      1.  We did your flu shot today.    2.  Overall things look good, keep up the good work.    It was great to speak with you today.  I value your experience and would be very thankful for your time with providing feedback on our clinic survey. You may receive a survey via email or text message in the next few days.     Next MTM visit: 6 months, sooner if needed    To schedule another MTM appointment, please call the clinic directly or you may call the MTM scheduling line at [phone] or toll-free at [phone].     My Clinical Pharmacist's contact information:                                                      It was a pleasure talking with you today!  Please feel free to contact me with any questions or concerns you have.      Lainey SilverD  Medication Therapy Management Resident  Pager: [phone]    Tosin Gillette PharmD, [facility]  Medication Therapy Management Provider  Pager: [phone]

## 2019-10-05 DIAGNOSIS — I10 ESSENTIAL HYPERTENSION: ICD-10-CM

## 2019-10-05 NOTE — TELEPHONE ENCOUNTER
"Requested Prescriptions   Pending Prescriptions Disp Refills     hydrochlorothiazide (MICROZIDE) 12.5 MG capsule [Pharmacy Med Name: hydroCHLOROthiazide Oral Capsule 12.5 MG]  Last Written Prescription Date:  4/1/2019  Last Fill Quantity: 180 cap,  # refills: 1   Last Office Visit: 10/1/2019 [place]  Future Office Visit:      180 capsule 0     Sig: Take 2 capsules (25 mg) by mouth daily       Diuretics (Including Combos) Protocol Passed - 10/5/2019  2:02 PM        Passed - Blood pressure under 140/90 in past 12 months     BP Readings from Last 3 Encounters:   10/01/19 130/60   07/03/19 111/74   06/03/19 123/71                 Passed - Recent (12 mo) or future (30 days) visit within the authorizing provider's specialty     Patient has had an office visit with the authorizing provider or a provider within the authorizing providers department within the previous 12 mos or has a future within next 30 days. See \"Patient Info\" tab in inbasket, or \"Choose Columns\" in Meds & Orders section of the refill encounter.              Passed - Medication is active on med list        Passed - Patient is age 18 or older        Passed - Normal serum creatinine on file in past 12 months     Recent Labs   Lab Test 06/03/19  1605   CR 1.10              Passed - Normal serum potassium on file in past 12 months     Recent Labs   Lab Test 06/03/19  1605   POTASSIUM 3.9                    Passed - Normal serum sodium on file in past 12 months     Recent Labs   Lab Test 06/03/19  1605                 "

## 2019-10-07 RX ORDER — HYDROCHLOROTHIAZIDE 12.5 MG/1
25 CAPSULE ORAL DAILY
Qty: 180 CAPSULE | Refills: 1 | Status: SHIPPED | OUTPATIENT
Start: 2019-10-07 | End: 2020-03-12

## 2019-10-28 DIAGNOSIS — I48.91 ATRIAL FIBRILLATION, UNSPECIFIED TYPE (H): ICD-10-CM

## 2019-10-29 NOTE — TELEPHONE ENCOUNTER
Last Written Prescription Date:  7/02/19  Last Fill Quantity: 90 tablet,  # refills: 0   Last office visit: 7/3/2019 with prescribing provider:  Julio   Future Office Visit:      Requested Prescriptions   Pending Prescriptions Disp Refills     rivaroxaban ANTICOAGULANT (XARELTO ANTICOAGULANT) 20 MG TABS tablet 90 tablet 0       Direct Oral Anticoagulant Agents Failed - 10/28/2019  3:28 PM        Failed - Normal Platelets on file in past 12 months     Recent Labs   Lab Test 12/28/17  1216                  Failed - Creatinine Clearance greater than 50 ml/min on file in past 3 mos     No lab results found.          Failed - Serum creatinine less than or equal to 1.4 on file in past 3 mos     Recent Labs   Lab Test 06/03/19  1605   CR 1.10             Passed - Medication is active on med list        Passed - Patient is 18 years of age or older        Passed - Recent (6 mo) or future (30 days) visit within the authorizing provider's specialty

## 2019-10-30 NOTE — TELEPHONE ENCOUNTER
Routing refill request to provider for review/approval because:  Labs not current: Platelets     Failed - Normal Platelets on file in past 12 months             Recent Labs   Lab Test 12/28/17  1216           Please review and authorize if appropriate,     Thank you,   Krissy CARBAJAL RN

## 2019-10-30 NOTE — NURSING NOTE
"Chief Complaint   Patient presents with     Recheck Medication       Initial /68 (BP Location: Left arm, Patient Position: Sitting, Cuff Size: Adult Regular)  Pulse 72  Temp 97.6  F (36.4  C) (Tympanic)  Ht 5' 9\" (1.753 m)  Wt 262 lb (118.8 kg)  SpO2 97%  BMI 38.69 kg/m2 Estimated body mass index is 38.69 kg/(m^2) as calculated from the following:    Height as of this encounter: 5' 9\" (1.753 m).    Weight as of this encounter: 262 lb (118.8 kg).  Medication Reconciliation: complete   Yasmin Cornejo- ILAN      " Unable to assess due to medical condition

## 2019-11-25 DIAGNOSIS — I10 ESSENTIAL HYPERTENSION WITH GOAL BLOOD PRESSURE LESS THAN 140/90: ICD-10-CM

## 2019-11-26 RX ORDER — LISINOPRIL 20 MG/1
TABLET ORAL
Qty: 180 TABLET | Refills: 2 | Status: SHIPPED | OUTPATIENT
Start: 2019-11-26 | End: 2020-07-29

## 2019-11-26 NOTE — TELEPHONE ENCOUNTER
"Last Written Prescription Date:  3/07/19  Last Fill Quantity: 180 tablet,  # refills: 2   Last office visit: 7/3/2019 with prescribing provider:  Julio   Future Office Visit:      Requested Prescriptions   Pending Prescriptions Disp Refills     lisinopril (PRINIVIL/ZESTRIL) 20 MG tablet 180 tablet 2       ACE Inhibitors (Including Combos) Protocol Passed - 11/25/2019  1:10 PM        Passed - Blood pressure under 140/90 in past 12 months     BP Readings from Last 3 Encounters:   10/01/19 130/60   07/03/19 111/74   06/03/19 123/71                 Passed - Recent (12 mo) or future (30 days) visit within the authorizing provider's specialty     Patient has had an office visit with the authorizing provider or a provider within the authorizing providers department within the previous 12 mos or has a future within next 30 days. See \"Patient Info\" tab in inbasket, or \"Choose Columns\" in Meds & Orders section of the refill encounter.              Passed - Medication is active on med list        Passed - Patient is age 18 or older        Passed - Normal serum creatinine on file in past 12 months     Recent Labs   Lab Test 06/03/19  1605   CR 1.10             Passed - Normal serum potassium on file in past 12 months     Recent Labs   Lab Test 06/03/19  1605   POTASSIUM 3.9               "

## 2019-11-29 ENCOUNTER — TELEPHONE (OUTPATIENT)
Dept: UROLOGY | Facility: CLINIC | Age: 69
End: 2019-11-29

## 2019-11-29 DIAGNOSIS — N20.0 KIDNEY STONE: Primary | ICD-10-CM

## 2019-11-29 NOTE — TELEPHONE ENCOUNTER
Order placed as requested. Pt will call Imaging to schedule his appt.  KYMBERLY MorejonRN       [facility]    Phone Message    May a detailed message be left on voicemail: yes    Reason for Call: Order(s): Other:   Reason for requested: KUB (would like to have at 0730)  Date needed: prior to apt on 1/10  Provider name: Dr. Marques Beach      Action Taken: Other: ua uro

## 2019-12-23 DIAGNOSIS — I48.19 PERSISTENT ATRIAL FIBRILLATION (H): ICD-10-CM

## 2019-12-23 DIAGNOSIS — I48.91 ATRIAL FIBRILLATION, UNSPECIFIED TYPE (H): ICD-10-CM

## 2019-12-24 NOTE — TELEPHONE ENCOUNTER
"metoprolol succinate ER (TOPROL-XL) 50 MG 24 hr tablet  Last Written Prescription Date:  8/01/19  Last Fill Quantity: 90 tablet,  # refills: 1   Last office visit: 7/3/2019 with prescribing provider:  Julio Mae Office Visit:      rivaroxaban ANTICOAGULANT (XARELTO ANTICOAGULANT) 20 MG TABS tablet  Last Written Prescription Date:  10/30/19  Last Fill Quantity: 90 tablet,  # refills: 0   Last office visit: 7/3/2019 with prescribing provider:  Julio Mae Office Visit:      Requested Prescriptions   Pending Prescriptions Disp Refills     metoprolol succinate ER (TOPROL-XL) 50 MG 24 hr tablet 90 tablet 1     Sig: Take 1 tablet (50 mg) by mouth daily       Beta-Blockers Protocol Passed - 12/23/2019  5:15 PM        Passed - Blood pressure under 140/90 in past 12 months     BP Readings from Last 3 Encounters:   10/01/19 130/60   07/03/19 111/74   06/03/19 123/71                 Passed - Patient is age 6 or older        Passed - Recent (12 mo) or future (30 days) visit within the authorizing provider's specialty     Patient has had an office visit with the authorizing provider or a provider within the authorizing providers department within the previous 12 mos or has a future within next 30 days. See \"Patient Info\" tab in inbasket, or \"Choose Columns\" in Meds & Orders section of the refill encounter.              Passed - Medication is active on med list        rivaroxaban ANTICOAGULANT (XARELTO ANTICOAGULANT) 20 MG TABS tablet 90 tablet 0     Sig: TAKE ONE TABLET BY MOUTH ONE TIME DAILY WITH DINNER       Direct Oral Anticoagulant Agents Failed - 12/23/2019  5:15 PM        Failed - Normal Platelets on file in past 12 months     Recent Labs   Lab Test 12/28/17  1216                  Failed - Creatinine Clearance greater than 50 ml/min on file in past 3 mos     No lab results found.          Failed - Serum creatinine less than or equal to 1.4 on file in past 3 mos     Recent Labs   Lab Test 06/03/19  1605 "   CR 1.10             Passed - Medication is active on med list        Passed - Patient is 18 years of age or older        Passed - Recent (6 mo) or future (30 days) visit within the authorizing provider's specialty

## 2019-12-26 NOTE — TELEPHONE ENCOUNTER
Routing refill request to provider for review/approval because:  Labs not current:  CBC, creat for Xarel  Was due in October for visit.  Please authorize if appropriate.  Thanks,  Kaela Pappas RN        Added in pharm comments to schedule   Return in about 3 months (around 10/3/2019) for Routine Visit, Lab Work.

## 2019-12-27 RX ORDER — METOPROLOL SUCCINATE 50 MG/1
50 TABLET, EXTENDED RELEASE ORAL DAILY
Qty: 90 TABLET | Refills: 3 | Status: SHIPPED | OUTPATIENT
Start: 2019-12-27 | End: 2021-02-04

## 2020-01-02 DIAGNOSIS — N20.0 CALCULUS OF KIDNEY: ICD-10-CM

## 2020-01-02 DIAGNOSIS — N40.1 BENIGN PROSTATIC HYPERPLASIA WITH LOWER URINARY TRACT SYMPTOMS, SYMPTOM DETAILS UNSPECIFIED: Primary | ICD-10-CM

## 2020-01-06 ENCOUNTER — HOSPITAL ENCOUNTER (OUTPATIENT)
Dept: GENERAL RADIOLOGY | Facility: CLINIC | Age: 70
Discharge: HOME | End: 2020-01-06
Attending: UROLOGY | Admitting: UROLOGY
Payer: COMMERCIAL

## 2020-01-06 DIAGNOSIS — N20.0 KIDNEY STONE: ICD-10-CM

## 2020-01-06 PROCEDURE — 74019 RADEX ABDOMEN 2 VIEWS: CPT

## 2020-01-10 ENCOUNTER — OFFICE VISIT (OUTPATIENT)
Dept: UROLOGY | Facility: CLINIC | Age: 70
End: 2020-01-10
Payer: COMMERCIAL

## 2020-01-10 VITALS
SYSTOLIC BLOOD PRESSURE: 130 MMHG | WEIGHT: 255 LBS | HEART RATE: 64 BPM | OXYGEN SATURATION: 97 % | DIASTOLIC BLOOD PRESSURE: 80 MMHG | BODY MASS INDEX: 38.65 KG/M2 | HEIGHT: 68 IN

## 2020-01-10 DIAGNOSIS — N40.1 BENIGN PROSTATIC HYPERPLASIA WITH LOWER URINARY TRACT SYMPTOMS, SYMPTOM DETAILS UNSPECIFIED: ICD-10-CM

## 2020-01-10 DIAGNOSIS — N20.0 CALCULUS OF KIDNEY: ICD-10-CM

## 2020-01-10 LAB
ALBUMIN UR-MCNC: NEGATIVE MG/DL
APPEARANCE UR: CLEAR
BILIRUB UR QL STRIP: NEGATIVE
COLOR UR AUTO: YELLOW
GLUCOSE UR STRIP-MCNC: >=1000 MG/DL
HGB UR QL STRIP: ABNORMAL
KETONES UR STRIP-MCNC: NEGATIVE MG/DL
LEUKOCYTE ESTERASE UR QL STRIP: NEGATIVE
NITRATE UR QL: NEGATIVE
PH UR STRIP: 6 PH (ref 5–7)
PSA SERPL-MCNC: 0.69 NG/ML (ref 0–4)
SOURCE: ABNORMAL
SP GR UR STRIP: 1.01 (ref 1–1.03)
UROBILINOGEN UR STRIP-ACNC: 0.2 EU/DL (ref 0.2–1)

## 2020-01-10 PROCEDURE — 84153 ASSAY OF PSA TOTAL: CPT | Performed by: UROLOGY

## 2020-01-10 PROCEDURE — 99213 OFFICE O/P EST LOW 20 MIN: CPT | Performed by: UROLOGY

## 2020-01-10 PROCEDURE — 81003 URINALYSIS AUTO W/O SCOPE: CPT | Performed by: UROLOGY

## 2020-01-10 PROCEDURE — 36415 COLL VENOUS BLD VENIPUNCTURE: CPT | Performed by: UROLOGY

## 2020-01-10 ASSESSMENT — MIFFLIN-ST. JEOR: SCORE: 1896.17

## 2020-01-10 ASSESSMENT — PAIN SCALES - GENERAL: PAINLEVEL: NO PAIN (0)

## 2020-01-10 NOTE — NURSING NOTE
Chief Complaint   Patient presents with     Follow Up     patient is here for 1 year follow up with KUB, and PSA.      Francisca Meek, CMA

## 2020-01-10 NOTE — PROGRESS NOTES
Te Yoder is a 69-year-old male with BPH, erectile dysfunction, urolithiasis.  His PSA is 0.69.  KUB shows no recurrent stones.  He has not given his erectile dysfunction any further thought  Other past medical history: Pupillary defect, anemia, atrial fibrillation, B12 deficiency, chronic renal disease, exotropia, hypertension, microalbuminuria, morbid obesity, type 2 diabetes, sleep apnea, neuropathy, non-smoker  No family history of prostate cancer  Medications: Long list reviewed  Allergies: Simvastatin  Review of systems: No dysuria or hematuria  Exam: Alert and oriented, normal appearance, normal vital signs, normal respirations, neuro grossly intact.  Normal sphincter tone, no rectal mass or impaction, benign and symmetric prostate, normal seminal vesicles  Urinalysis: Greater than 1000 glucose, specific gravity 1.015, pH six-point  Assessment: No recurrent stones, no evidence for prostate cancer.  Erectile dysfunction  Plan: See me in 1 year for digital rectal exam, urinalysis, KUB

## 2020-01-10 NOTE — LETTER
1/10/2020       RE: Te Yoder  [address]  [place] [address]     Dear Colleague,    Thank you for referring your patient, Te Yoder, to the [facility] [place] at [facility]. Please see a copy of my visit note below.    Te Yoder is a 69-year-old male with BPH, erectile dysfunction, urolithiasis.  His PSA is 0.69.  KUB shows no recurrent stones.  He has not given his erectile dysfunction any further thought  Other past medical history: Pupillary defect, anemia, atrial fibrillation, B12 deficiency, chronic renal disease, exotropia, hypertension, microalbuminuria, morbid obesity, type 2 diabetes, sleep apnea, neuropathy, non-smoker  No family history of prostate cancer  Medications: Long list reviewed  Allergies: Simvastatin  Review of systems: No dysuria or hematuria  Exam: Alert and oriented, normal appearance, normal vital signs, normal respirations, neuro grossly intact.  Normal sphincter tone, no rectal mass or impaction, benign and symmetric prostate, normal seminal vesicles  Urinalysis: Greater than 1000 glucose, specific gravity 1.015, pH six-point  Assessment: No recurrent stones, no evidence for prostate cancer.  Erectile dysfunction  Plan: See me in 1 year for digital rectal exam, urinalysis, KUB    Again, thank you for allowing me to participate in the care of your patient.      Sincerely,    Marques Beach MD

## 2020-03-11 DIAGNOSIS — I10 ESSENTIAL HYPERTENSION: ICD-10-CM

## 2020-03-11 NOTE — TELEPHONE ENCOUNTER
"hydrochlorothiazide (MICROZIDE) 12.5 MG capsule  180 capsule  1  10/7/2019     Last Written Prescription Date:  10/7/19  Last Fill Quantity: 180,  # refills: 1   Last office visit: 10/1/2019 with prescribing provider:  Julio   Future Office Visit:  None    Requested Prescriptions   Pending Prescriptions Disp Refills     hydrochlorothiazide (MICROZIDE) 12.5 MG capsule [Pharmacy Med Name: hydroCHLOROthiazide Oral Capsule 12.5 MG] 180 capsule 0     Sig: Take 2 capsules (25 mg) by mouth daily       Diuretics (Including Combos) Protocol Passed - 3/11/2020  7:02 AM        Passed - Blood pressure under 140/90 in past 12 months     BP Readings from Last 3 Encounters:   01/10/20 130/80   10/01/19 130/60   07/03/19 111/74                 Passed - Recent (12 mo) or future (30 days) visit within the authorizing provider's specialty     Patient has had an office visit with the authorizing provider or a provider within the authorizing providers department within the previous 12 mos or has a future within next 30 days. See \"Patient Info\" tab in inbasket, or \"Choose Columns\" in Meds & Orders section of the refill encounter.              Passed - Medication is active on med list        Passed - Patient is age 18 or older        Passed - Normal serum creatinine on file in past 12 months     Recent Labs   Lab Test 06/03/19  1605   CR 1.10              Passed - Normal serum potassium on file in past 12 months     Recent Labs   Lab Test 06/03/19  1605   POTASSIUM 3.9                    Passed - Normal serum sodium on file in past 12 months     Recent Labs   Lab Test 06/03/19  1605                    No flowsheet data found.      "

## 2020-03-12 RX ORDER — HYDROCHLOROTHIAZIDE 12.5 MG/1
25 CAPSULE ORAL DAILY
Qty: 60 CAPSULE | Refills: 0 | Status: SHIPPED | OUTPATIENT
Start: 2020-03-12 | End: 2020-05-19

## 2020-03-12 NOTE — TELEPHONE ENCOUNTER
Medication is being filled for 1 time refill only due to:  Due for annual physical Kaela Pappas RN     Return in about 3 months (around 10/3/2019) for Routine Visit, Lab Work.

## 2020-03-15 ENCOUNTER — HEALTH MAINTENANCE LETTER (OUTPATIENT)
Age: 70
End: 2020-03-15

## 2020-04-24 ENCOUNTER — TELEPHONE (OUTPATIENT)
Dept: FAMILY MEDICINE | Facility: CLINIC | Age: 70
End: 2020-04-24

## 2020-04-24 NOTE — TELEPHONE ENCOUNTER
Panel Management Review      Patient has the following on his problem list:     Diabetes    ASA: Not Required     Last A1C  Lab Results   Component Value Date    A1C 7.5 06/03/2019    A1C 7.3 02/05/2018    A1C 7.0 10/18/2017    A1C 7.1 10/12/2017    A1C 6.8 05/17/2017     A1C tested: FAILED    Last LDL:    Lab Results   Component Value Date    CHOL 113 06/03/2019     Lab Results   Component Value Date    HDL 31 06/03/2019     Lab Results   Component Value Date    LDL 60 06/03/2019     Lab Results   Component Value Date    TRIG 112 06/03/2019     Lab Results   Component Value Date    CHOLHDLRATIO 3.8 01/30/2015     Lab Results   Component Value Date    NHDL 82 06/03/2019       Is the patient on a Statin? YES             Is the patient on Aspirin? NO    Medications     HMG CoA Reductase Inhibitors     rosuvastatin (CRESTOR) 20 MG tablet       Salicylates     ASPIRIN NOT PRESCRIBED, INTENTIONAL,             Last three blood pressure readings:  BP Readings from Last 3 Encounters:   01/10/20 130/80   10/01/19 130/60   07/03/19 111/74       Date of last diabetes office visit: 07/03/19     Tobacco History:     History   Smoking Status     Never Smoker   Smokeless Tobacco     Never Used           Composite cancer screening  Chart review shows that this patient is due/due soon for the following Eye exam  Summary:    Patient is due/failing the following:   Eye Exam, A1C and PHYSICAL    Action needed:   DATE and Location of Eye Exam    Type of outreach:    Sent [employer] message.    Questions for provider review:    None                                                                                                                                    Ruby Cuenca MA       Chart routed to  .

## 2020-05-07 ENCOUNTER — ALLIED HEALTH/NURSE VISIT (OUTPATIENT)
Dept: PHARMACY | Facility: CLINIC | Age: 70
End: 2020-05-07
Payer: COMMERCIAL

## 2020-05-07 DIAGNOSIS — E63.9 NUTRITIONAL DISORDER: ICD-10-CM

## 2020-05-07 DIAGNOSIS — Z79.4 TYPE 2 DIABETES MELLITUS WITH DIABETIC NEPHROPATHY, WITH LONG-TERM CURRENT USE OF INSULIN (H): Primary | ICD-10-CM

## 2020-05-07 DIAGNOSIS — E11.21 TYPE 2 DIABETES MELLITUS WITH DIABETIC NEPHROPATHY, WITH LONG-TERM CURRENT USE OF INSULIN (H): Primary | ICD-10-CM

## 2020-05-07 DIAGNOSIS — I48.91 ATRIAL FIBRILLATION, UNSPECIFIED TYPE (H): ICD-10-CM

## 2020-05-07 DIAGNOSIS — I10 ESSENTIAL HYPERTENSION: ICD-10-CM

## 2020-05-07 DIAGNOSIS — E78.5 HYPERLIPIDEMIA LDL GOAL <100: ICD-10-CM

## 2020-05-07 PROCEDURE — 99607 MTMS BY PHARM ADDL 15 MIN: CPT | Performed by: PHARMACIST

## 2020-05-07 PROCEDURE — 99605 MTMS BY PHARM NP 15 MIN: CPT | Performed by: PHARMACIST

## 2020-05-07 RX ORDER — DAPAGLIFLOZIN 10 MG/1
10 TABLET, FILM COATED ORAL DAILY
COMMUNITY

## 2020-05-07 NOTE — PROGRESS NOTES
MTM ENCOUNTER  SUBJECTIVE/OBJECTIVE:                           Te Yoder is a 69 year old male called for a follow-up visit. He was referred to me from Dr. Brown, he has since established care with Dr. Kim.  Today's visit is a follow-up MTM visit from 10/1/19, serving as an initial visit for 2020.     Patient consented to a telehealth visit: yes  Telemedicine Visit Details  Type of service:  Telephone visit  Start Time: 3:44 PM  End Time: 4:16 PM  Originating Location (pt. Location): Home  Distant Location (provider location):  [facility] [place]  Mode of Communication:  Telephone    Chief Complaint: Routine f/up.    Tobacco:  reports that he has never smoked. He has never used smokeless tobacco.  Alcohol: 1-3 beverages / week    Medication Adherence/Access: no issues reported    Diabetes:  He continues working closely with Dr. Arnold as well as diabetes education through the endo clinic.  Pt currently taking Farxiga 10mg daily (changed from Jardiance due to insurance preference), glipizide XL 5mg daily, metformin 1000mg BID and Humalog via VGo 40 device.  Pt is not experiencing side effects.  SMBG: Patient uses Freestyle Radha which he continues to enjoy using.  He reports BG are stable.  Symptoms of low blood sugar? Sweaty, shaky. Frequency of hypoglycemia? Rarely, typically occurs with over-bolusing   Recent symptoms of high blood sugar? Neuropathy.  Eye exam: due  Foot exam: due  Microalbumin is not < 30 mg/g. Pt is taking an ACEi/ARB.  Aspirin: Not taking due to Xarelto use  Diet/Exercise:  He's working again so activity levels are increased.  Last A1c (per his report) at endocrinology: 7.4% in February  Lab Results   Component Value Date    A1C 7.5 06/03/2019    A1C 7.3 02/05/2018    A1C 7.0 10/18/2017    A1C 7.1 10/12/2017    A1C 6.8 05/17/2017     Hypertension/A. Fib: Current medications include HCTZ 25mg daily, lisionpril 20mg BID, metoprolol XL 50mg daily and Xarelto 20mg daily.   Patient does not self-monitor BP.  Patient reports no medication side effects.    BP Readings from Last 3 Encounters:   01/10/20 130/80   10/01/19 130/60   07/03/19 111/74       Hyperlipidemia: Current therapy includes rosuvastatin 20mg. Patient denies muscle pains or other side effects.  LDL Cholesterol Calculated   Date Value Ref Range Status   06/03/2019 60 <100 mg/dL Final     Comment:     Desirable:       <100 mg/dl       Supplements: Current supplements include Vitamin D and vitamin B-12 2500mcg daily.  He didn't realize he had 5000 international unit(s) Vitamin D tablets and had been taking 4 of these per day. .  He denies side effects.  Vitamin B12 level 5/17/17 = 1108pg/mL.  Vitamin D Deficiency Screening Results:  Lab Results   Component Value Date    VITDT 69 12/28/2017    VITDT 67 12/12/2013      Today's Vitals: There were no vitals taken for this visit. - telephone visit due to COVID-19 pandemic    ASSESSMENT:                            Medication Adherence: good, no issues identified    Diabetes: Stable.  A1c is at goal <8% and SMBG is at goal 90-150mg/dL fasting and <180mg/dL post-prandial per his report.    Hypertension/A. Fib: Stable. Patient is meeting BP goal of < 140/90mmHg.      Hyperlipidemia: Stable. Pt is on high intensity statin which is indicated based on 2013 ACC/AHA guidelines for lipid management.  Due for lipid panel.    Supplements:  Needs improvement - he's been taking 20,000 international unit(s) of Vitamin D per day, needs to reduce dose.  It doesn't sound like he's been taking this dose for very long (maybe a few weeks) as he didn't realize he bought 5000 international unit(s) dose (previously had 1000 international unit(s)).    PLAN:                            1.  Reduce Vitamin D to 5000 international unit(s) daily.    I spent 32 minutes with this patient today. A copy of the visit note was provided to the patient's primary care provider.    Will follow up with PCP in May/June,  with MTM thereafter based on lab results.    The patient declined a summary of these recommendations.     Tosin Gillette PharmD, [facility]  Medication Therapy Management Provider  Pager: [phone]

## 2020-05-15 LAB — RETINOPATHY: POSITIVE

## 2020-05-18 DIAGNOSIS — I10 ESSENTIAL HYPERTENSION: ICD-10-CM

## 2020-05-19 RX ORDER — HYDROCHLOROTHIAZIDE 12.5 MG/1
25 CAPSULE ORAL DAILY
Qty: 180 CAPSULE | Refills: 0 | Status: SHIPPED | OUTPATIENT
Start: 2020-05-19 | End: 2020-07-29

## 2020-06-01 ENCOUNTER — VIRTUAL VISIT (OUTPATIENT)
Dept: FAMILY MEDICINE | Facility: CLINIC | Age: 70
End: 2020-06-01
Payer: COMMERCIAL

## 2020-06-01 ENCOUNTER — TELEPHONE (OUTPATIENT)
Dept: FAMILY MEDICINE | Facility: CLINIC | Age: 70
End: 2020-06-01

## 2020-06-01 DIAGNOSIS — H47.20 PARTIAL OPTIC ATROPHY: ICD-10-CM

## 2020-06-01 DIAGNOSIS — I25.10 CORONARY ARTERY CALCIFICATION: ICD-10-CM

## 2020-06-01 DIAGNOSIS — G47.33 OSA (OBSTRUCTIVE SLEEP APNEA): ICD-10-CM

## 2020-06-01 DIAGNOSIS — Z79.4 TYPE 2 DIABETES MELLITUS WITHOUT COMPLICATION, WITH LONG-TERM CURRENT USE OF INSULIN (H): ICD-10-CM

## 2020-06-01 DIAGNOSIS — M54.12 CERVICAL RADICULOPATHY: ICD-10-CM

## 2020-06-01 DIAGNOSIS — E11.9 TYPE 2 DIABETES MELLITUS WITHOUT COMPLICATION, WITH LONG-TERM CURRENT USE OF INSULIN (H): ICD-10-CM

## 2020-06-01 DIAGNOSIS — N18.2 CKD (CHRONIC KIDNEY DISEASE) STAGE 2, GFR 60-89 ML/MIN: ICD-10-CM

## 2020-06-01 DIAGNOSIS — G62.9 PERIPHERAL POLYNEUROPATHY: ICD-10-CM

## 2020-06-01 DIAGNOSIS — G58.8 PHRENIC NEUROPATHY: ICD-10-CM

## 2020-06-01 DIAGNOSIS — I10 ESSENTIAL HYPERTENSION: ICD-10-CM

## 2020-06-01 DIAGNOSIS — I48.91 ATRIAL FIBRILLATION, UNSPECIFIED TYPE (H): ICD-10-CM

## 2020-06-01 DIAGNOSIS — E66.01 MORBID OBESITY DUE TO EXCESS CALORIES (H): Primary | ICD-10-CM

## 2020-06-01 PROCEDURE — 99214 OFFICE O/P EST MOD 30 MIN: CPT | Mod: TEL | Performed by: INTERNAL MEDICINE

## 2020-06-01 NOTE — TELEPHONE ENCOUNTER
Panel Management Review      Patient has the following on his problem list: None      Composite cancer screening  Chart review shows that this patient is due/due soon for the following Eye exam  Summary:    Patient is due/failing the following:   Eye exam    Action needed:   Remove flag for HM eye exam    Type of outreach:    None, routed to provider for review.    Questions for provider review:    None                                                                                                                                    Justen Jennings CMA on 6/1/2020 at 11:02 AM       Chart routed to Care Team .

## 2020-06-01 NOTE — PROGRESS NOTES
"Te Yoder is a 69 year old male who is being evaluated via a billable telephone visit.      The patient has been notified of following:     \"This telephone visit will be conducted via a call between you and your physician/provider. We have found that certain health care needs can be provided without the need for a physical exam.  This service lets us provide the care you need with a short phone conversation.  If a prescription is necessary we can send it directly to your pharmacy.  If lab work is needed we can place an order for that and you can then stop by our lab to have the test done at a later time.    Telephone visits are billed at different rates depending on your insurance coverage. During this emergency period, for some insurers they may be billed the same as an in-person visit.  Please reach out to your insurance provider with any questions.    If during the course of the call the physician/provider feels a telephone visit is not appropriate, you will not be charged for this service.\"    Patient has given verbal consent for Telephone visit?  Yes    What phone number would you like to be contacted at? [phone]    How would you like to obtain your AVS? Mail a copy    Subjective     Te Yoder is a 69 year old male who presents via phone visit today for the following health issues:    HPI  Patient has been working with the endocrine clinic and his blood sugars have been stable and his A1c was recently 7.4, similar to 7.5 last fall.  His medications have essentially been the same except for the Jardiance being changed to Farxiga.    His peripheral neuropathy has been essentially the same with paresthesias to below the knee.  He denies any problems with his feet.    Diet, activity and weight remain the same.  He continues to work at the [employer] as his main source of exercise.  He is concerned about social distancing.    Otherwise, he has been managing the pandemic relatively well and is in good " spirits        Current Outpatient Medications   Medication Sig Dispense Refill     ACCU-CHEK CLAUDIO Test 3-4 times daily as directed       ASPIRIN NOT PRESCRIBED, INTENTIONAL, Reported on 3/8/2017       Cholecalciferol (VITAMIN D3) 5000 UNITS CHEW Take 1 capsule by mouth daily        Continuous Blood Gluc Sensor (FREESTYLE MASOOD 14 DAY SENSOR) MISC 1 Device every 14 days       dapagliflozin (FARXIGA) 10 MG TABS tablet Take 10 mg by mouth daily       glipiZIDE (GLUCOTROL XL) 5 MG 24 hr tablet Take 5 mg by mouth daily       hydrochlorothiazide (MICROZIDE) 12.5 MG capsule Take 2 capsules (25 mg) by mouth daily 180 capsule 0     insulin lispro (HUMALOG VIAL) 100 UNIT/ML vial To be used with VGo 40       lisinopril (PRINIVIL/ZESTRIL) 20 MG tablet Take 1 tablet (20 mg) by mouth two times daily 180 tablet 2     metFORMIN (GLUCOPHAGE) 1000 MG tablet TAKE ONE TABLET BY MOUTH TWICE DAILY WITH MEALS 180 tablet 0     metoprolol succinate ER (TOPROL-XL) 50 MG 24 hr tablet Take 1 tablet (50 mg) by mouth daily 90 tablet 3     order for DME b knee high compression stockings - 15-20 mm Hg 2 Device 1     order for DME DREAMSTATION  5-15CM/H20  NASAL COMFORT BLUE       order for DME Equipment being ordered: diabetic shoes 1 Device 1     rivaroxaban ANTICOAGULANT (XARELTO ANTICOAGULANT) 20 MG TABS tablet TAKE ONE TABLET BY MOUTH ONE TIME DAILY WITH DINNER 90 tablet 3     rosuvastatin (CRESTOR) 20 MG tablet Take 1 tablet (20 mg) by mouth daily. 90 tablet 3     vitamin B-12 (CYANOCOBALAMIN) 2500 MCG sublingual tablet Take 2,500 mcg by mouth daily       wearable insulin delivery device ([employer]-GO 40) kit 1 pod by In Vitro route Insulin delivery device to be changed every 24 hours - 7-8 clicks (delivering 2 units each) with each meal       Allergies   Allergen Reactions     Simvastatin Other (See Comments)     Muscle aches, elevated CK levels       Reviewed and updated as needed this visit by Provider         Review of Systems   Constitutional,  HEENT, cardiovascular, pulmonary, gi and gu systems are negative, except as otherwise noted.       Objective   Reported vitals:  There were no vitals taken for this visit.   healthy, alert and no distress  PSYCH: Alert and oriented times 3; coherent speech, normal   rate and volume, able to articulate logical thoughts, able   to abstract reason, no tangential thoughts, no hallucinations   or delusions  His affect is normal  RESP: No cough, no audible wheezing, able to talk in full sentences  Remainder of exam unable to be completed due to telephone visits            Assessment/Plan:  1. Morbid obesity due to excess calories (H)  No changes by report    2. Atrial fibrillation, unspecified type (H)  Well-controlled with current therapy    3. Type 2 diabetes mellitus without complication, with long-term current use of insulin (H)  Ongoing routine surveillance through the endocrine clinic, [facility] and our clinic    4. Peripheral polyneuropathy  No changes, continue same therapy including pressure support    5. Phrenic neuropathy      6. CKD (chronic kidney disease) stage 2, GFR 60-89 ml/min  Continue ongoing surveillance    7. Essential hypertension  Well-controlled with current therapy    8. ROC (obstructive sleep apnea)      9. Partial optic atrophy      10. Coronary artery calcification      11. Cervical radiculopathy        No follow-ups on file.  Patient is scheduled for a wellness exam in 1 month and will continue with his routine diabetic surveillance    Phone call duration:16   minutes    Delta Kim MD

## 2020-06-25 DIAGNOSIS — Z79.4 TYPE 2 DIABETES MELLITUS WITHOUT COMPLICATION, WITH LONG-TERM CURRENT USE OF INSULIN (H): ICD-10-CM

## 2020-06-25 DIAGNOSIS — E11.9 TYPE 2 DIABETES MELLITUS WITHOUT COMPLICATION, WITH LONG-TERM CURRENT USE OF INSULIN (H): ICD-10-CM

## 2020-06-26 NOTE — TELEPHONE ENCOUNTER
Medication refilled one time.  Pharmacy instructed to notify patient to call and send a follow up, either in clinic or virtual as appropriate.   Pt had recent visit, due for follow up soon. Notified pharm

## 2020-07-02 ENCOUNTER — TELEPHONE (OUTPATIENT)
Dept: SLEEP MEDICINE | Facility: CLINIC | Age: 70
End: 2020-07-02

## 2020-07-02 NOTE — TELEPHONE ENCOUNTER
RETURNED PT CALL TO SEE WHAT WAS NEEDED AS FAR AS EDUCATION WAS CONCERNED. THE PT STATED THAT HE KNOWS NOTHING ABOUT THE TANK ON THE MACHINE AND ALSO WHAT ARE THE SQUIGGLY LINES ON THE FRONT OF THE MACHINE. ASKED THE PT IF HE HAD TIME RIGHT NOW TO GO OVER THE MACHINE. HE STATED THAT HE IS PULLING INTO THE GARAGE AT WORK AND HE COULD NOT TALK ABOUT IT NOW. HE ALSO ASKED IF THE PRESSURES WERE CORRECT FOR HIM. I LET THE PT KNOW THAT HE WOULD NEED TO REACH OUT TO THE PROVIDER REGARDING THE PRESSURES AND IF THE ARE SUITABLE FOR CORRECTING AND OR HELP WITH THE PT SLEEP APNEA. LET THE PT KNOW THAT THOSE APPOINTMENTS ARE ALSO VI. LET THE PT KNOW TO REACH BACK OUT WHEN HE HAS MORE TIME TO DISCUSS HIS MACHINE AND THAT I WILL BE MORE THAN HAPPY TO ASSIST IN ANYWAY THAT I CAN CONCERNING HIS CPAP MACHINE AND RE-EDUCATION.

## 2020-07-06 ENCOUNTER — OFFICE VISIT (OUTPATIENT)
Dept: FAMILY MEDICINE | Facility: CLINIC | Age: 70
End: 2020-07-06
Payer: COMMERCIAL

## 2020-07-06 VITALS
SYSTOLIC BLOOD PRESSURE: 119 MMHG | BODY MASS INDEX: 38.65 KG/M2 | HEART RATE: 96 BPM | TEMPERATURE: 97.8 F | OXYGEN SATURATION: 96 % | WEIGHT: 255 LBS | DIASTOLIC BLOOD PRESSURE: 64 MMHG | HEIGHT: 68 IN

## 2020-07-06 DIAGNOSIS — E08.21 DIABETES MELLITUS DUE TO UNDERLYING CONDITION WITH DIABETIC NEPHROPATHY, WITHOUT LONG-TERM CURRENT USE OF INSULIN (H): ICD-10-CM

## 2020-07-06 DIAGNOSIS — I10 ESSENTIAL HYPERTENSION: ICD-10-CM

## 2020-07-06 DIAGNOSIS — N18.2 CKD (CHRONIC KIDNEY DISEASE) STAGE 2, GFR 60-89 ML/MIN: ICD-10-CM

## 2020-07-06 DIAGNOSIS — G62.9 PERIPHERAL POLYNEUROPATHY: ICD-10-CM

## 2020-07-06 DIAGNOSIS — M54.12 CERVICAL RADICULOPATHY: ICD-10-CM

## 2020-07-06 DIAGNOSIS — G47.33 OSA (OBSTRUCTIVE SLEEP APNEA): ICD-10-CM

## 2020-07-06 DIAGNOSIS — G58.8 PHRENIC NEUROPATHY: ICD-10-CM

## 2020-07-06 DIAGNOSIS — E53.8 B12 DEFICIENCY: ICD-10-CM

## 2020-07-06 DIAGNOSIS — Z01.818 PREOP GENERAL PHYSICAL EXAM: Primary | ICD-10-CM

## 2020-07-06 DIAGNOSIS — I48.91 ATRIAL FIBRILLATION, UNSPECIFIED TYPE (H): ICD-10-CM

## 2020-07-06 DIAGNOSIS — E78.5 HYPERLIPIDEMIA LDL GOAL <100: ICD-10-CM

## 2020-07-06 LAB
ANION GAP SERPL CALCULATED.3IONS-SCNC: 5 MMOL/L (ref 3–14)
BUN SERPL-MCNC: 22 MG/DL (ref 7–30)
CALCIUM SERPL-MCNC: 8.8 MG/DL (ref 8.5–10.1)
CHLORIDE SERPL-SCNC: 105 MMOL/L (ref 94–109)
CHOLEST SERPL-MCNC: 117 MG/DL
CO2 SERPL-SCNC: 29 MMOL/L (ref 20–32)
CREAT SERPL-MCNC: 1.2 MG/DL (ref 0.66–1.25)
CREAT UR-MCNC: 88 MG/DL
ERYTHROCYTE [DISTWIDTH] IN BLOOD BY AUTOMATED COUNT: 13.8 % (ref 10–15)
GFR SERPL CREATININE-BSD FRML MDRD: 61 ML/MIN/{1.73_M2}
GLUCOSE SERPL-MCNC: 93 MG/DL (ref 70–99)
HBA1C MFR BLD: 7.3 % (ref 0–5.6)
HCT VFR BLD AUTO: 46.5 % (ref 40–53)
HDLC SERPL-MCNC: 31 MG/DL
HGB BLD-MCNC: 15 G/DL (ref 13.3–17.7)
LDLC SERPL CALC-MCNC: 65 MG/DL
MCH RBC QN AUTO: 30.7 PG (ref 26.5–33)
MCHC RBC AUTO-ENTMCNC: 32.3 G/DL (ref 31.5–36.5)
MCV RBC AUTO: 95 FL (ref 78–100)
MICROALBUMIN UR-MCNC: 201 MG/L
MICROALBUMIN/CREAT UR: 228.41 MG/G CR (ref 0–17)
NONHDLC SERPL-MCNC: 86 MG/DL
PLATELET # BLD AUTO: 149 10E9/L (ref 150–450)
POTASSIUM SERPL-SCNC: 3.6 MMOL/L (ref 3.4–5.3)
RBC # BLD AUTO: 4.89 10E12/L (ref 4.4–5.9)
SODIUM SERPL-SCNC: 139 MMOL/L (ref 133–144)
TRIGL SERPL-MCNC: 107 MG/DL
WBC # BLD AUTO: 8 10E9/L (ref 4–11)

## 2020-07-06 PROCEDURE — 80048 BASIC METABOLIC PNL TOTAL CA: CPT | Performed by: INTERNAL MEDICINE

## 2020-07-06 PROCEDURE — 93000 ELECTROCARDIOGRAM COMPLETE: CPT | Performed by: INTERNAL MEDICINE

## 2020-07-06 PROCEDURE — 36415 COLL VENOUS BLD VENIPUNCTURE: CPT | Performed by: INTERNAL MEDICINE

## 2020-07-06 PROCEDURE — 83036 HEMOGLOBIN GLYCOSYLATED A1C: CPT | Performed by: INTERNAL MEDICINE

## 2020-07-06 PROCEDURE — 99215 OFFICE O/P EST HI 40 MIN: CPT | Performed by: INTERNAL MEDICINE

## 2020-07-06 PROCEDURE — 85027 COMPLETE CBC AUTOMATED: CPT | Performed by: INTERNAL MEDICINE

## 2020-07-06 PROCEDURE — 99207 C FOOT EXAM  NO CHARGE: CPT | Performed by: INTERNAL MEDICINE

## 2020-07-06 PROCEDURE — 80061 LIPID PANEL: CPT | Performed by: INTERNAL MEDICINE

## 2020-07-06 PROCEDURE — 82043 UR ALBUMIN QUANTITATIVE: CPT | Performed by: INTERNAL MEDICINE

## 2020-07-06 ASSESSMENT — MIFFLIN-ST. JEOR: SCORE: 1896.17

## 2020-07-06 NOTE — PROGRESS NOTES
[address] [address]  [phone]  Dept: [phone]    PRE-OP EVALUATION:  Today's date: 2020    Te Yoder (: 1950) presents for pre-operative evaluation assessment as requested by Dr. Kinney.  He requires evaluation and anesthesia risk assessment prior to undergoing surgery/procedure for treatment of cataracts.    Proposed Surgery/ Procedure: right eye cataracts  Date of Surgery/ Procedure: 2020  Time of Surgery/ Procedure: [facility]  Hospital/Surgical Facility: [facility]  Fax number for surgical facility: [phone]  Primary Physician: Delta Kim  Type of Anesthesia Anticipated: to be determined    Patient has a Health Care Directive or Living Will:  NO    1. NO - Do you have a history of heart attack, stroke, stent, bypass or surgery on an artery in the head, neck, heart or legs?  2. NO - Do you ever have any pain or discomfort in your chest?  3. NO - Do you have a history of  Heart Failure?  4. NO - Are you troubled by shortness of breath when: walking on the level, up a slight hill or at night?  5. NO - Do you currently have a cold, bronchitis or other respiratory infection?  6. NO - Do you have a cough, shortness of breath or wheezing?  7. YES - DO YOU SOMETIMES GET PAINS IN THE CALVES OF YOUR LEGS WHEN YOU WALK? Neuropathy   8. NO - Do you or anyone in your family have previous history of blood clots?  9. NO - Do you or does anyone in your family have a serious bleeding problem such as prolonged bleeding following surgeries or cuts?  10. NO - Have you ever had problems with anemia or been told to take iron pills?  11. NO - Have you had any abnormal blood loss such as black, tarry or bloody stools, or abnormal vaginal bleeding?  12. NO - Have you ever had a blood transfusion?  13. NO - Have you or any of your relatives ever had problems with anesthesia?  14. YES - DO YOU HAVE SLEEP APNEA, EXCESSIVE SNORING OR DAYTIME  DROWSINESS? Sleep apnea (on CPAP)  15. NO - Do you have any prosthetic heart valves?  16. NO - Do you have prosthetic joints?  17. NO - Is there any chance that you may be pregnant?      HPI:     HPI related to upcoming procedure:   69-year-old with adult onset diabetes, peripheral neuropathy essential hypertension obstructive sleep apnea and with cervical degenerative disc disease with radiculopathy presents for an evaluation prior to his right cataract surgery by Dr. Kinney.  -AODM  Improved control  -Atrial fibrillation  Well-controlled rate with current therapy  -Essential hypertension  Well-controlled with current therapy      MEDICAL HISTORY:     Patient Active Problem List    Diagnosis Date Noted     Diabetes mellitus, type 2 (H) 01/25/2019     Priority: Medium     Diabetes mellitus due to underlying condition with diabetic nephropathy, without long-term current use of insulin (H) 10/24/2017     Priority: Medium     Coronary artery calcification 10/24/2016     Priority: Medium     Cervical radiculopathy 09/08/2016     Priority: Medium     Seasonal allergic rhinitis 05/11/2016     Priority: Medium     Peripheral neuropathy 05/11/2016     Priority: Medium     Atrial fibrillation (H) 12/15/2014     Priority: Medium     Partial optic atrophy 01/29/2014     Priority: Medium     Phrenic neuropathy 10/08/2012     Priority: Medium     B12 deficiency 07/18/2012     Priority: Medium     Advanced directives, counseling/discussion 07/16/2012     Priority: Medium     Discussed advance care planning with patient; information given to patient to review. 7/16/2012     Honoring choices letter mailed.  8-  Maisha Patterson RT (R)         Low HDL (under 40) 07/16/2012     Priority: Medium     Anemia 07/16/2012     Priority: Medium     ROC (obstructive sleep apnea) 12/21/2011     Priority: Medium     On cpap       CKD (chronic kidney disease) stage 2, GFR 60-89 ml/min 11/16/2011     Priority: Medium     Hypertension  04/15/2011     Priority: Medium     Overweight BMI 40-45 04/15/2011     Priority: Medium     Hyperlipidemia LDL goal <100 04/15/2011     Priority: Medium     Last FLP 03/27/14       Impotence of organic origin 12/14/2004     Priority: Medium      Past Medical History:   Diagnosis Date     Afferent pupillary defect      Anemia      Atrial fibrillation (H) 12/10/14    EKG done at [facility] 12/10/14      B12 deficiency      Calculus of kidney     PSA followed by urology     CKD (chronic kidney disease), stage II      Exotropia      HTN (hypertension)      Hyperlipidaemia      Hyperlipidemia      Microalbuminuria      Morbid obesity (H)      Nonspecific abnormal unspecified cardiovascular function study      Optic atrophy, left eye     LE, mild     Optic disc pallor     LE     ROC on CPAP      Palpitations      Phrenic neuropathy      Type II or unspecified type diabetes mellitus without mention of complication, not stated as uncontrolled     retinopathy and nephropathy - followed by Dr. Arnold     Past Surgical History:   Procedure Laterality Date     COLONOSCOPY  2003     COLONOSCOPY  11/25/2013    Procedure: COLONOSCOPY;  COLONOSCOPY;  Surgeon: Uday Taylor MD;  Location:  GI     Current Outpatient Medications   Medication Sig Dispense Refill     ACCU-CHEK CLAUDIO Test 3-4 times daily as directed       ASPIRIN NOT PRESCRIBED, INTENTIONAL, Reported on 3/8/2017       Cholecalciferol (VITAMIN D3) 5000 UNITS CHEW Take 1 capsule by mouth daily        Continuous Blood Gluc Sensor (FREESTYLE MASOOD 14 DAY SENSOR) MISC 1 Device every 14 days       dapagliflozin (FARXIGA) 10 MG TABS tablet Take 10 mg by mouth daily       glipiZIDE (GLUCOTROL XL) 5 MG 24 hr tablet Take 5 mg by mouth daily       hydrochlorothiazide (MICROZIDE) 12.5 MG capsule Take 2 capsules (25 mg) by mouth daily 180 capsule 0     insulin lispro (HUMALOG VIAL) 100 UNIT/ML vial To be used with VGo 40       lisinopril (PRINIVIL/ZESTRIL) 20 MG tablet Take 1  "tablet (20 mg) by mouth two times daily 180 tablet 2     metFORMIN (GLUCOPHAGE) 1000 MG tablet TAKE ONE TABLET BY MOUTH TWICE DAILY WITH MEALS 180 tablet 0     metoprolol succinate ER (TOPROL-XL) 50 MG 24 hr tablet Take 1 tablet (50 mg) by mouth daily 90 tablet 3     order for DME b knee high compression stockings - 15-20 mm Hg 2 Device 1     order for DME DREAMSTATION  5-15CM/H20  NASAL COMFORT BLUE       order for DME Equipment being ordered: diabetic shoes 1 Device 1     rivaroxaban ANTICOAGULANT (XARELTO ANTICOAGULANT) 20 MG TABS tablet TAKE ONE TABLET BY MOUTH ONE TIME DAILY WITH DINNER 90 tablet 3     rosuvastatin (CRESTOR) 20 MG tablet Take 1 tablet (20 mg) by mouth daily. 90 tablet 3     vitamin B-12 (CYANOCOBALAMIN) 2500 MCG sublingual tablet Take 2,500 mcg by mouth daily       wearable insulin delivery device ([employer] 40) kit 1 pod by In Vitro route Insulin delivery device to be changed every 24 hours - 7-8 clicks (delivering 2 units each) with each meal       OTC products: None, except as noted above    Allergies   Allergen Reactions     Simvastatin Other (See Comments)     Muscle aches, elevated CK levels      Latex Allergy: NO    Social History     Tobacco Use     Smoking status: Never Smoker     Smokeless tobacco: Never Used   Substance Use Topics     Alcohol use: Yes     Alcohol/week: 0.0 standard drinks     Comment: 4 drinks per week     History   Drug Use No       REVIEW OF SYSTEMS:   Constitutional, neuro, ENT, endocrine, pulmonary, cardiac dyspnea on exertion is thought to be related to his poor endurance and low level of activity, gastrointestinal, genitourinary, musculoskeletal chronic venous stasis is unchanged and chronic, integument and psychiatric systems are negative, except as otherwise noted.    EXAM:   /64 (Patient Position: Sitting)   Pulse 96   Temp 97.8  F (36.6  C) (Tympanic)   Ht 1.727 m (5' 8\")   Wt 115.7 kg (255 lb)   SpO2 96%   BMI 38.77 kg/m      GENERAL APPEARANCE: " Morbidly obese , alert and no distress     EYES: EOMI,  PERRL     HENT: ear canals and TM's normal and nose and mouth without ulcers or lesions     NECK: no adenopathy, no asymmetry, masses, or scars and thyroid normal to palpation     RESP: lungs clear to auscultation - no rales, rhonchi or wheezes     CV: Irregularly irregular rhythm,      ABDOMEN:  soft, nontender, no HSM or masses and bowel sounds normal     MS: extremities normal- no gross deformities noted, no evidence of inflammation in joints, FROM in all extremities.  1-2+ pretibial edema without cords or tenderness     SKIN: no suspicious lesions or rashes     NEURO: Normal strength and tone, sensory exam grossly normal, mentation intact and speech normal     PSYCH: mentation appears normal. and affect normal/bright     LYMPHATICS: No cervical adenopathy  No PND or JVD  Feet were scaly clean and dry  DIAGNOSTICS:   EKG  Atrial fibrillation with a well-controlled ventricular response and occasional unifocal PVCs  Nonspecific ST and T wave changes    Recent Labs CBC, BMP, hemoglobin A1c                                                                   IMPRESSION:       The proposed surgical procedure is considered LOW risk.    REVISED CARDIAC RISK INDEX  The patient has the following serious cardiovascular risks for perioperative complications such as (MI, PE, VFib and 3  AV Block):  Atrial fibrillation  INTERPRETATION: 0 risks: Class I (very low risk - 0.4% complication rate)    The patient has the following additional risks for perioperative complications:    AODM      ICD-10-CM    1. CKD (chronic kidney disease) stage 2, GFR 60-89 ml/min  N18.2 CBC with platelets     Basic metabolic panel   2. Diabetes mellitus due to underlying condition with diabetic nephropathy, without long-term current use of insulin (H)  E08.21 Albumin Random Urine Quantitative with Creat Ratio     C FOOT EXAM  NO CHARGE     Hemoglobin A1c   3. Atrial fibrillation, unspecified type  (H)  I48.91    4. Peripheral polyneuropathy  G62.9    5. Phrenic neuropathy  G58.8    6. B12 deficiency  E53.8    7. Essential hypertension  I10 CBC with platelets     Basic metabolic panel   8. ROC (obstructive sleep apnea)  G47.33    9. Cervical radiculopathy  M54.12    10. Hyperlipidemia LDL goal <100  E78.5 Lipid panel reflex to direct LDL Fasting   11. Preop general physical exam  Z01.818        RECOMMENDATIONS:         --Patient is to take all scheduled medications on the day of surgery EXCEPT for modifications listed below.  Patient is to hold hydrochlorothiazide, lisinopril, metformin on the day of surgery.  He will continue his Xarelto as per usual    APPROVAL GIVEN to proceed with proposed procedure, without further diagnostic evaluation       Signed Electronically by: Delta Kim MD    Copy of this evaluation report is provided to requesting physician.    [facility] Preop Guidelines    Revised Cardiac Risk Index

## 2020-07-10 ENCOUNTER — TELEPHONE (OUTPATIENT)
Dept: FAMILY MEDICINE | Facility: CLINIC | Age: 70
End: 2020-07-10

## 2020-07-10 NOTE — TELEPHONE ENCOUNTER
[facility] called and wanted to let us know that the Pt is having surgery on Monday 7/13/2020 and this is needed before then.

## 2020-07-10 NOTE — TELEPHONE ENCOUNTER
Dr. Kim called clinic and is aware surgery is Monday and needs to be manually faxed to a non  site.    Melina Rader at [facility]  [phone]   Fax number for surgical facility: [phone]

## 2020-07-10 NOTE — TELEPHONE ENCOUNTER
PCP,    Please sign Pre-op notes from 7/6. Please let us know when the encounter has been signed and we will fax to surgery center    Thank you,  Axel ABDI RN

## 2020-07-14 DIAGNOSIS — I25.10 CORONARY ARTERY CALCIFICATION: ICD-10-CM

## 2020-07-15 RX ORDER — ROSUVASTATIN CALCIUM 20 MG/1
20 TABLET, COATED ORAL DAILY
Qty: 90 TABLET | Refills: 3 | Status: SHIPPED | OUTPATIENT
Start: 2020-07-15 | End: 2021-06-01

## 2020-07-30 ENCOUNTER — TRANSFERRED RECORDS (OUTPATIENT)
Dept: HEALTH INFORMATION MANAGEMENT | Facility: CLINIC | Age: 70
End: 2020-07-30

## 2020-08-07 ENCOUNTER — TELEPHONE (OUTPATIENT)
Dept: FAMILY MEDICINE | Facility: CLINIC | Age: 70
End: 2020-08-07

## 2020-08-07 NOTE — TELEPHONE ENCOUNTER
Panel Management Review      Patient has the following on his problem list:     Diabetes    ASA: Passed    Last A1C  Lab Results   Component Value Date    A1C 7.3 07/06/2020    A1C 7.5 06/03/2019    A1C 7.3 02/05/2018    A1C 7.0 10/18/2017    A1C 7.1 10/12/2017     A1C tested: Passed    Last LDL:    Lab Results   Component Value Date    CHOL 117 07/06/2020     Lab Results   Component Value Date    HDL 31 07/06/2020     Lab Results   Component Value Date    LDL 65 07/06/2020     Lab Results   Component Value Date    TRIG 107 07/06/2020     Lab Results   Component Value Date    CHOLHDLRATIO 3.8 01/30/2015     Lab Results   Component Value Date    NHDL 86 07/06/2020       Is the patient on a Statin? YES             Is the patient on Aspirin? NO    Medications     HMG CoA Reductase Inhibitors     rosuvastatin (CRESTOR) 20 MG tablet       Salicylates     ASPIRIN NOT PRESCRIBED, INTENTIONAL,             Last three blood pressure readings:  BP Readings from Last 3 Encounters:   07/06/20 119/64   01/10/20 130/80   10/01/19 130/60       Date of last diabetes office visit: 06/01/2020     Tobacco History:     History   Smoking Status     Never Smoker   Smokeless Tobacco     Never Used           Composite cancer screening  Chart review shows that this patient is due/due soon for the following None  Summary:    Patient is due/failing the following:   Eye Exam and PHYSICAL    Action needed:   Patient needs office visit for wellness visit.    Type of outreach:    Phone, spoke to patient.  had eye exam, called [facility] and asked for records    Questions for provider review:    None                                                                                                                                    Ruby Cuenca MA       Chart routed to  .

## 2020-09-16 ENCOUNTER — ALLIED HEALTH/NURSE VISIT (OUTPATIENT)
Dept: NURSING | Facility: CLINIC | Age: 70
End: 2020-09-16
Payer: COMMERCIAL

## 2020-09-16 DIAGNOSIS — Z23 NEED FOR PROPHYLACTIC VACCINATION AND INOCULATION AGAINST INFLUENZA: Primary | ICD-10-CM

## 2020-09-16 PROCEDURE — 90662 IIV NO PRSV INCREASED AG IM: CPT

## 2020-09-16 PROCEDURE — 99207 ZZC NO CHARGE NURSE ONLY: CPT

## 2020-09-16 PROCEDURE — G0008 ADMIN INFLUENZA VIRUS VAC: HCPCS

## 2020-09-17 ENCOUNTER — NURSE TRIAGE (OUTPATIENT)
Dept: NURSING | Facility: CLINIC | Age: 70
End: 2020-09-17

## 2020-09-17 NOTE — TELEPHONE ENCOUNTER
Patient asking for covid test only.  Does not want provider visit.  Wants walk up only  That is not an option.  He chose to look elsewhere.  Shital Ibarra RN  [facility] Nurse Advisor

## 2020-11-02 ENCOUNTER — NURSE TRIAGE (OUTPATIENT)
Dept: NURSING | Facility: CLINIC | Age: 70
End: 2020-11-02

## 2020-11-02 NOTE — TELEPHONE ENCOUNTER
Te calls in with a positive test af the  [facility].  Discussed care and home care measures and letting people that  He was in contact with know about his positive diagnosis. Also told him that they could wait 5-7 days before testing due to time period of transmission.  Also if symptoms sooner can do testing sooner for friends and family.  He will quarantine for 14 days from time of test since he is asymptomatic. He agrees to these plans.

## 2020-11-22 DIAGNOSIS — I10 ESSENTIAL HYPERTENSION: ICD-10-CM

## 2020-11-24 RX ORDER — HYDROCHLOROTHIAZIDE 12.5 MG/1
CAPSULE ORAL
Qty: 180 CAPSULE | Refills: 1 | Status: SHIPPED | OUTPATIENT
Start: 2020-11-24 | End: 2021-05-05

## 2020-12-17 DIAGNOSIS — E11.9 TYPE 2 DIABETES MELLITUS WITHOUT COMPLICATION, WITH LONG-TERM CURRENT USE OF INSULIN (H): ICD-10-CM

## 2020-12-17 DIAGNOSIS — Z79.4 TYPE 2 DIABETES MELLITUS WITHOUT COMPLICATION, WITH LONG-TERM CURRENT USE OF INSULIN (H): ICD-10-CM

## 2020-12-29 ENCOUNTER — OFFICE VISIT (OUTPATIENT)
Dept: FAMILY MEDICINE | Facility: CLINIC | Age: 70
End: 2020-12-29
Payer: COMMERCIAL

## 2020-12-29 VITALS
TEMPERATURE: 98.8 F | BODY MASS INDEX: 38.24 KG/M2 | SYSTOLIC BLOOD PRESSURE: 111 MMHG | WEIGHT: 252.3 LBS | HEART RATE: 82 BPM | DIASTOLIC BLOOD PRESSURE: 72 MMHG | OXYGEN SATURATION: 99 % | HEIGHT: 68 IN

## 2020-12-29 DIAGNOSIS — M54.12 CERVICAL RADICULOPATHY: ICD-10-CM

## 2020-12-29 DIAGNOSIS — I48.19 PERSISTENT ATRIAL FIBRILLATION (H): ICD-10-CM

## 2020-12-29 DIAGNOSIS — G62.9 PERIPHERAL POLYNEUROPATHY: ICD-10-CM

## 2020-12-29 DIAGNOSIS — E66.01 MORBID OBESITY DUE TO EXCESS CALORIES (H): Primary | ICD-10-CM

## 2020-12-29 DIAGNOSIS — E08.21 DIABETES MELLITUS DUE TO UNDERLYING CONDITION WITH DIABETIC NEPHROPATHY, WITHOUT LONG-TERM CURRENT USE OF INSULIN (H): ICD-10-CM

## 2020-12-29 DIAGNOSIS — E55.9 VITAMIN D DEFICIENCY: ICD-10-CM

## 2020-12-29 DIAGNOSIS — I25.10 CORONARY ARTERY CALCIFICATION: ICD-10-CM

## 2020-12-29 DIAGNOSIS — E53.8 B12 DEFICIENCY: ICD-10-CM

## 2020-12-29 DIAGNOSIS — I10 ESSENTIAL HYPERTENSION: ICD-10-CM

## 2020-12-29 DIAGNOSIS — Z13.6 CARDIOVASCULAR SCREENING; LDL GOAL LESS THAN 100: ICD-10-CM

## 2020-12-29 LAB
ALBUMIN UR-MCNC: NEGATIVE MG/DL
APPEARANCE UR: CLEAR
BACTERIA #/AREA URNS HPF: ABNORMAL /HPF
BILIRUB UR QL STRIP: NEGATIVE
COLOR UR AUTO: YELLOW
ERYTHROCYTE [DISTWIDTH] IN BLOOD BY AUTOMATED COUNT: 14.8 % (ref 10–15)
GLUCOSE UR STRIP-MCNC: 500 MG/DL
HBA1C MFR BLD: 8.4 % (ref 0–5.6)
HCT VFR BLD AUTO: 47 % (ref 40–53)
HGB BLD-MCNC: 15.3 G/DL (ref 13.3–17.7)
HGB UR QL STRIP: ABNORMAL
KETONES UR STRIP-MCNC: NEGATIVE MG/DL
LEUKOCYTE ESTERASE UR QL STRIP: NEGATIVE
MCH RBC QN AUTO: 30.7 PG (ref 26.5–33)
MCHC RBC AUTO-ENTMCNC: 32.6 G/DL (ref 31.5–36.5)
MCV RBC AUTO: 94 FL (ref 78–100)
NITRATE UR QL: NEGATIVE
NON-SQ EPI CELLS #/AREA URNS LPF: ABNORMAL /LPF
PH UR STRIP: 5 PH (ref 5–7)
PLATELET # BLD AUTO: 159 10E9/L (ref 150–450)
RBC # BLD AUTO: 4.98 10E12/L (ref 4.4–5.9)
RBC #/AREA URNS AUTO: ABNORMAL /HPF
SOURCE: ABNORMAL
SP GR UR STRIP: 1.02 (ref 1–1.03)
UROBILINOGEN UR STRIP-ACNC: 0.2 EU/DL (ref 0.2–1)
VIT B12 SERPL-MCNC: 674 PG/ML (ref 193–986)
WBC # BLD AUTO: 6.4 10E9/L (ref 4–11)
WBC #/AREA URNS AUTO: ABNORMAL /HPF

## 2020-12-29 PROCEDURE — 82043 UR ALBUMIN QUANTITATIVE: CPT | Performed by: INTERNAL MEDICINE

## 2020-12-29 PROCEDURE — 82306 VITAMIN D 25 HYDROXY: CPT | Performed by: INTERNAL MEDICINE

## 2020-12-29 PROCEDURE — 82607 VITAMIN B-12: CPT | Performed by: INTERNAL MEDICINE

## 2020-12-29 PROCEDURE — 99397 PER PM REEVAL EST PAT 65+ YR: CPT | Performed by: INTERNAL MEDICINE

## 2020-12-29 PROCEDURE — 80061 LIPID PANEL: CPT | Performed by: INTERNAL MEDICINE

## 2020-12-29 PROCEDURE — 84443 ASSAY THYROID STIM HORMONE: CPT | Performed by: INTERNAL MEDICINE

## 2020-12-29 PROCEDURE — 80053 COMPREHEN METABOLIC PANEL: CPT | Performed by: INTERNAL MEDICINE

## 2020-12-29 PROCEDURE — 85027 COMPLETE CBC AUTOMATED: CPT | Performed by: INTERNAL MEDICINE

## 2020-12-29 PROCEDURE — 36415 COLL VENOUS BLD VENIPUNCTURE: CPT | Performed by: INTERNAL MEDICINE

## 2020-12-29 PROCEDURE — 81001 URINALYSIS AUTO W/SCOPE: CPT | Performed by: INTERNAL MEDICINE

## 2020-12-29 PROCEDURE — 83036 HEMOGLOBIN GLYCOSYLATED A1C: CPT | Performed by: INTERNAL MEDICINE

## 2020-12-29 ASSESSMENT — MIFFLIN-ST. JEOR: SCORE: 1878.93

## 2020-12-29 ASSESSMENT — ACTIVITIES OF DAILY LIVING (ADL): CURRENT_FUNCTION: NO ASSISTANCE NEEDED

## 2020-12-29 NOTE — PROGRESS NOTES
"SUBJECTIVE:   Te Yoder is a 70 year old male who presents for Preventive Visit.    Patient has been advised of split billing requirements and indicates understanding: Yes   Are you in the first 12 months of your Medicare coverage?  No    Healthy Habits:    In general, how would you rate your overall health?  Fair    Frequency of exercise:: Walks.    Duration of exercise:  30-45 minutes    Do you usually eat at least 4 servings of fruit and vegetables a day, include whole grains    & fiber and avoid regularly eating high fat or \"junk\" foods?  No    Taking medications regularly:  Yes    Barriers to taking medications:  None    Medication side effects:  None    Ability to successfully perform activities of daily living:  No assistance needed    Home Safety:  No safety concerns identified    Hearing Impairment:  No hearing concerns    In the past 6 months, have you been bothered by leaking of urine?  No    In general, how would you rate your overall mental or emotional health?  Good      PHQ-2 Total Score:    Additional concerns today:  No    Do you feel safe in your environment? Yes    Have you ever done Advance Care Planning? (For example, a Health Directive, POLST, or a discussion with a medical provider or your loved ones about your wishes):       Fall risk     Cognitive Screening   1) Repeat 3 items (Leader, Season, Table)    2) Clock draw: NORMAL  3) 3 item recall: Recalls 2 objects   Results: NORMAL clock, 1-2 items recalled: COGNITIVE IMPAIRMENT LESS LIKELY    Mini-CogTM Copyright EDWIN Marmolejo. Licensed by the author for use in [facility] [place]; reprinted with permission (bruce@.[facility]). All rights reserved.      Do you have sleep apnea, excessive snoring or daytime drowsiness?: no    Reviewed and updated as needed this visit by clinical staff  Tobacco  Allergies  Meds              Reviewed and updated as needed this visit by Provider                Social History     Tobacco Use     Smoking status: " Never Smoker     Smokeless tobacco: Never Used   Substance Use Topics     Alcohol use: Yes     Alcohol/week: 0.0 standard drinks     Comment: 4 drinks per week     If you drink alcohol do you typically have >3 drinks per day or >7 drinks per week? No    No flowsheet data found.  AODM  Ophthalmologist 720  Blood sugar 122      Current providers sharing in care for this patient include:   Patient Care Team:  Delta Kim MD as PCP - General (Internal Medicine)  Ramakrishna Lombardi (Ophthalmology)  MARK Arnold MD as MD (INTERNAL MEDICINE - ENDOCRINOLOGY, DIABETES & METABOLISM)  Delta Kim MD as Assigned PCP  Tosin Gillette [facility] as Pharmacist (Pharmacist)  Marques Beach MD as Assigned Surgical Provider    The following health maintenance items are reviewed in Epic and correct as of today:  Health Maintenance   Topic Date Due     MEDICARE ANNUAL WELLNESS VISIT  10/20/2015     AORTIC ANEURYSM SCREENING (SYSTEM ASSIGNED)  10/20/2015     ADVANCE CARE PLANNING  07/16/2017     A1C  01/06/2021     EYE EXAM  05/15/2021     FALL RISK ASSESSMENT  06/01/2021     BMP  07/06/2021     LIPID  07/06/2021     MICROALBUMIN  07/06/2021     DIABETIC FOOT EXAM  07/06/2021     DTAP/TDAP/TD IMMUNIZATION (2 - Td) 07/16/2022     COLORECTAL CANCER SCREENING  11/25/2023     HEPATITIS C SCREENING  Completed     PHQ-2  Completed     INFLUENZA VACCINE  Completed     Pneumococcal Vaccine: 65+ Years  Completed     ZOSTER IMMUNIZATION  Completed     Pneumococcal Vaccine: Pediatrics (0 to 5 Years) and At-Risk Patients (6 to 64 Years)  Aged Out     IPV IMMUNIZATION  Aged Out     MENINGITIS IMMUNIZATION  Aged Out     Current Outpatient Medications   Medication Sig Dispense Refill     Cholecalciferol (VITAMIN D3) 5000 UNITS CHEW Take 1 capsule by mouth daily        Continuous Blood Gluc Sensor (FREESTYLE MASOOD 14 DAY SENSOR) MISC 1 Device every 14 days       dapagliflozin (FARXIGA) 10 MG TABS tablet Take 10 mg by mouth daily        glipiZIDE (GLUCOTROL XL) 5 MG 24 hr tablet Take 5 mg by mouth daily       hydrochlorothiazide (MICROZIDE) 12.5 MG capsule TAKE TWO CAPSULES BY MOUTH DAILY  180 capsule 1     insulin lispro (HUMALOG VIAL) 100 UNIT/ML vial To be used with VGo 40       lisinopril (ZESTRIL) 20 MG tablet Take 1 tablet (20 mg) by mouth two times daily 180 tablet 0     metFORMIN (GLUCOPHAGE) 1000 MG tablet TAKE ONE TABLET BY MOUTH TWICE DAILY WITH MEALS 180 tablet 0     metoprolol succinate ER (TOPROL-XL) 50 MG 24 hr tablet Take 1 tablet (50 mg) by mouth daily 90 tablet 3     rivaroxaban ANTICOAGULANT (XARELTO ANTICOAGULANT) 20 MG TABS tablet TAKE ONE TABLET BY MOUTH ONE TIME DAILY WITH DINNER 90 tablet 3     rosuvastatin (CRESTOR) 20 MG tablet Take 1 tablet (20 mg) by mouth daily. 90 tablet 3     vitamin B-12 (CYANOCOBALAMIN) 2500 MCG sublingual tablet Take 2,500 mcg by mouth daily       wearable insulin delivery device ([employer] 40) kit 1 pod by In Vitro route Insulin delivery device to be changed every 24 hours - 7-8 clicks (delivering 2 units each) with each meal       ASPIRIN NOT PRESCRIBED, INTENTIONAL, Reported on 3/8/2017       order for DME b knee high compression stockings - 15-20 mm Hg 2 Device 1     order for DME DREAMSTATION  5-15CM/H20  NASAL COMFORT BLUE       order for DME Equipment being ordered: diabetic shoes 1 Device 1     Allergies   Allergen Reactions     Simvastatin Other (See Comments)     Muscle aches, elevated CK levels         Review of Systems  CONSTITUTIONAL: NEGATIVE for fever, chills, change in weight  INTEGUMENTARY/SKIN: NEGATIVE for worrisome rashes, moles or lesions  EYES: NEGATIVE for vision changes or irritation  ENT/MOUTH: NEGATIVE for ear, mouth and throat problems  RESP: NEGATIVE for significant cough or SOB  BREAST: NEGATIVE for masses, tenderness or discharge  CV: NEGATIVE for chest pain, palpitations or peripheral edema,  GI: NEGATIVE for nausea, abdominal pain, heartburn, or change in bowel  "habits  : NEGATIVE for frequency, dysuria, or hematuria  MUSCULOSKELETAL: NEGATIVE for significant arthralgias or myalgia, left middle trigger finger  NEURO: NEGATIVE for weakness, dizziness or paresthesias  ENDOCRINE: NEGATIVE for temperature intolerance, skin/hair changes  HEME: NEGATIVE for bleeding problems  PSYCHIATRIC: NEGATIVE for changes in mood or affect    OBJECTIVE:   /72   Pulse 82   Temp 98.8  F (37.1  C) (Oral)   Ht 1.727 m (5' 8\")   Wt 114.4 kg (252 lb 4.8 oz)   SpO2 99%   BMI 38.36 kg/m   Estimated body mass index is 38.36 kg/m  as calculated from the following:    Height as of this encounter: 1.727 m (5' 8\").    Weight as of this encounter: 114.4 kg (252 lb 4.8 oz).  Physical Exam  GENERAL: healthy, alert and no distress  EYES: Eyes grossly normal to inspection, PERRL and conjunctivae and sclerae normal  HENT: ear canals and TM's normal, nose and mouth without ulcers or lesions  NECK: no adenopathy, no asymmetry, masses, or scars and thyroid normal to palpation  RESP: lungs clear to auscultation - no rales, rhonchi or wheezes  CV: regular rate and rhythm, normal S1 S2, no S3 or S4, no murmur, click or rub, no peripheral edema and peripheral pulses strong  ABDOMEN: soft, nontender, no hepatosplenomegaly, no masses and bowel sounds normal  MS: no gross musculoskeletal defects noted, no edema  SKIN: no suspicious lesions or rashes  NEURO: Normal strength and tone, mentation intact and speech normal  PSYCH: mentation appears normal, affect normal/bright        ASSESSMENT / PLAN:   1. Morbid obesity due to excess calories (H)    - Urine Microscopic    2. Persistent atrial fibrillation (H)      3. Diabetes mellitus due to underlying condition with diabetic nephropathy, without long-term current use of insulin (H)    - Hemoglobin A1c  - TSH with free T4 reflex  - Albumin Random Urine Quantitative with Creat Ratio    4. Essential hypertension    - UA reflex to Microscopic and Culture  - CBC " "with platelets  - Comprehensive metabolic panel    5. Coronary artery calcification      6. Peripheral polyneuropathy      7. B12 deficiency    - Vitamin B12    8. Cervical radiculopathy      9. Vitamin D deficiency    - Vitamin D Deficiency    10. CARDIOVASCULAR SCREENING; LDL GOAL LESS THAN 100    - Lipid panel reflex to direct LDL Fasting    Patient has been advised of split billing requirements and indicates understanding: Yes  COUNSELING:  Reviewed preventive health counseling, as reflected in patient instructions    Estimated body mass index is 38.36 kg/m  as calculated from the following:    Height as of this encounter: 1.727 m (5' 8\").    Weight as of this encounter: 114.4 kg (252 lb 4.8 oz).        He reports that he has never smoked. He has never used smokeless tobacco.      Appropriate preventive services were discussed with this patient, including applicable screening as appropriate for cardiovascular disease, diabetes, osteopenia/osteoporosis, and glaucoma.  As appropriate for age/gender, discussed screening for colorectal cancer, prostate cancer, breast cancer, and cervical cancer. Checklist reviewing preventive services available has been given to the patient.    Reviewed patients plan of care and provided an AVS. The Basic Care Plan (routine screening as documented in Health Maintenance) for Te meets the Care Plan requirement. This Care Plan has been established and reviewed with the Patient.    Counseling Resources:  ATP IV Guidelines  Pooled Cohorts Equation Calculator  Breast Cancer Risk Calculator  Breast Cancer: Medication to Reduce Risk  FRAX Risk Assessment  ICSI Preventive Guidelines  Dietary Guidelines for Americans, 2010  USDA's MyPlate  ASA Prophylaxis  Lung CA Screening    Delta Kim MD  [facility]    Identified Health Risks:  "

## 2020-12-30 LAB
ALBUMIN SERPL-MCNC: 3.7 G/DL (ref 3.4–5)
ALP SERPL-CCNC: 72 U/L (ref 40–150)
ALT SERPL W P-5'-P-CCNC: 24 U/L (ref 0–70)
ANION GAP SERPL CALCULATED.3IONS-SCNC: 7 MMOL/L (ref 3–14)
AST SERPL W P-5'-P-CCNC: 16 U/L (ref 0–45)
BILIRUB SERPL-MCNC: 0.6 MG/DL (ref 0.2–1.3)
BUN SERPL-MCNC: 23 MG/DL (ref 7–30)
CALCIUM SERPL-MCNC: 9.5 MG/DL (ref 8.5–10.1)
CHLORIDE SERPL-SCNC: 106 MMOL/L (ref 94–109)
CHOLEST SERPL-MCNC: 146 MG/DL
CO2 SERPL-SCNC: 27 MMOL/L (ref 20–32)
CREAT SERPL-MCNC: 1.11 MG/DL (ref 0.66–1.25)
CREAT UR-MCNC: 61 MG/DL
DEPRECATED CALCIDIOL+CALCIFEROL SERPL-MC: 136 UG/L (ref 20–75)
GFR SERPL CREATININE-BSD FRML MDRD: 67 ML/MIN/{1.73_M2}
GLUCOSE SERPL-MCNC: 157 MG/DL (ref 70–99)
HDLC SERPL-MCNC: 34 MG/DL
LDLC SERPL CALC-MCNC: 89 MG/DL
MICROALBUMIN UR-MCNC: 72 MG/L
MICROALBUMIN/CREAT UR: 117.68 MG/G CR (ref 0–17)
NONHDLC SERPL-MCNC: 112 MG/DL
POTASSIUM SERPL-SCNC: 3.9 MMOL/L (ref 3.4–5.3)
PROT SERPL-MCNC: 7.3 G/DL (ref 6.8–8.8)
SODIUM SERPL-SCNC: 140 MMOL/L (ref 133–144)
TRIGL SERPL-MCNC: 114 MG/DL
TSH SERPL DL<=0.005 MIU/L-ACNC: 3.51 MU/L (ref 0.4–4)

## 2021-01-04 DIAGNOSIS — I48.91 ATRIAL FIBRILLATION, UNSPECIFIED TYPE (H): ICD-10-CM

## 2021-01-05 ENCOUNTER — VIRTUAL VISIT (OUTPATIENT)
Dept: PHARMACY | Facility: CLINIC | Age: 71
End: 2021-01-05
Payer: COMMERCIAL

## 2021-01-05 DIAGNOSIS — E63.9 NUTRITIONAL DISORDER: ICD-10-CM

## 2021-01-05 DIAGNOSIS — E78.5 HYPERLIPIDEMIA LDL GOAL <100: ICD-10-CM

## 2021-01-05 DIAGNOSIS — I48.91 ATRIAL FIBRILLATION, UNSPECIFIED TYPE (H): ICD-10-CM

## 2021-01-05 DIAGNOSIS — Z79.4 TYPE 2 DIABETES MELLITUS WITH DIABETIC NEPHROPATHY, WITH LONG-TERM CURRENT USE OF INSULIN (H): Primary | ICD-10-CM

## 2021-01-05 DIAGNOSIS — I10 ESSENTIAL HYPERTENSION: ICD-10-CM

## 2021-01-05 DIAGNOSIS — E11.21 TYPE 2 DIABETES MELLITUS WITH DIABETIC NEPHROPATHY, WITH LONG-TERM CURRENT USE OF INSULIN (H): Primary | ICD-10-CM

## 2021-01-05 PROCEDURE — 99605 MTMS BY PHARM NP 15 MIN: CPT | Mod: TEL | Performed by: PHARMACIST

## 2021-01-05 PROCEDURE — 99607 MTMS BY PHARM ADDL 15 MIN: CPT | Mod: TEL | Performed by: PHARMACIST

## 2021-01-05 NOTE — PROGRESS NOTES
Medication Therapy Management (MTM) Encounter    ASSESSMENT/PLAN:                            Medication Adherence/Access: No issues identified    Diabetes: Needs improvement.  A1c is not at goal <8% (or more aggressive goal <7%).  Would benefit from getting back on track with diet/exercise.  Plan in place to transfer endo care to [place].  Also needs to establish with new primary care physician.    Hypertension/A. Fib: Stable. Patient is meeting BP goal of < 140/90mmHg.      Hyperlipidemia: Stable. Pt is on high intensity statin which is indicated based on 2013 ACC/AHA guidelines for lipid management.      Supplements:  Needs improvement - last Vitamin D level was quite high, would benefit from reducing supplementation dose.    PLAN:   1.  Patient will schedule appointment to establish care with Dr. Tam after Dr. Kim's halfway.  Patient will also schedule endo appointment.  2.  Patient to get back on track with diet/exercise.  3.  Patient to reduce Vitamin D to 1 capsule daily.    Will follow up in 3 months, sooner if needed.    SUBJECTIVE/OBJECTIVE:                           Te Yoder is a 70 year old male called for a follow-up visit. He was referred to me from Dr Brown, he has since established care with Dr. Kim.  Today's visit is a follow-up MTM visit from 5/7/20.     Reason for visit: Comprehensive medication review.    Tobacco: He reports that he has never smoked. He has never used smokeless tobacco.  Alcohol: 1-3 beverages / week    Medication Adherence/Access: no issues reported    Diabetes:  He continues working closely with Dr. Arnold as well as diabetes education through the endo clinic.  Dr. Arnold is retiring and Te is thinking about switching to Dr. England.  Patient is currently taking Farxiga 10mg daily (changed from Jardiance due to insurance preference), glipizide XL 5mg daily, metformin 1000mg BID and Humalog via VGo 40 device.  Pt is not experiencing side  "effects.  SMBG: Patient uses Freestyle Radha which he continues to enjoy using.  He reports BG are stable, doesn't have exact readings to report today.  He uses Sendah Direct reader, does not download onto his computer.  Symptoms of low blood sugar? Sweaty, shaky. Frequency of hypoglycemia? Rarely, typically occurs with over-bolusing   Recent symptoms of high blood sugar? Neuropathy.  Eye exam: up to date  Foot exam: up to date  Microalbumin is not < 30 mg/g. Pt is taking an ACEi/ARB.  Lab Results   Component Value Date    UMALCR 117.68 (H) 12/29/2020   Aspirin: Not taking due to Xarelto use  Diet/Exercise:  He says \"I've been sitting around eating\" and attributes recent elevated A1c to this.  Activity levels have been low.  He's willing/able to work on this.  Lab Results   Component Value Date    A1C 8.4 12/29/2020    A1C 7.3 07/06/2020    A1C 7.5 06/03/2019    A1C 7.3 02/05/2018    A1C 7.0 10/18/2017     GFR Estimate   Date Value Ref Range Status   12/29/2020 67 >60 mL/min/[1.73_m2] Final     Comment:     Non  GFR Calc  Starting 12/18/2018, serum creatinine based estimated GFR (eGFR) will be   calculated using the Chronic Kidney Disease Epidemiology Collaboration   (CKD-EPI) equation.       Hypertension/A. Fib: Current medications include hydrochlorothiazide 25mg daily, lisionpril 20mg BID, metoprolol XL 50mg daily and Xarelto 20mg daily.  Patient does not self-monitor BP.  Patient reports no medication side effects including signs and symptoms bruising/bleeding.  BP Readings from Last 3 Encounters:   12/29/20 111/72   07/06/20 119/64   01/10/20 130/80        Hyperlipidemia: Current therapy includes rosuvastatin 20mg. Patient denies muscle pains or other side effects.  The 10-year ASCVD risk score (Mitramarco a SANTAMARIA Jr., et al., 2013) is: 29.9%    Values used to calculate the score:      Age: 70 years      Sex: Male      Is Non- : No      Diabetic: Yes      Tobacco smoker: No      Systolic " Blood Pressure: 111 mmHg      Is BP treated: Yes      HDL Cholesterol: 34 mg/dL      Total Cholesterol: 146 mg/dL     LDL Cholesterol Calculated   Date Value Ref Range Status   12/29/2020 89 <100 mg/dL Final     Comment:     Desirable:       <100 mg/dl        Supplements: Current supplements include Vitamin D and vitamin B-12 2500mcg daily.  He's unsure what dose of Vitamin D he's been taking, but has been taking 2-3 capsules of this. He denies side effects.   Lab Results   Component Value Date    B12 674 12/29/2020      Vitamin D Deficiency Screening Results:  Lab Results   Component Value Date    VITDT 136 (H) 12/29/2020    VITDT 69 12/28/2017    VITDT 67 12/12/2013      Today's Vitals: There were no vitals taken for this visit.    I spent 30 minutes with this patient today. A copy of the visit note was provided to the patient's primary care provider.    The patient declined a summary of these recommendations.     Tosin Gillette, PharmD, [facility]  Medication Therapy Management Provider  Pager: [phone]     Patient consented to a telehealth visit: yes  Telemedicine Visit Details  Type of service:  Telephone visit  Start Time: 10:34 AM  End Time: 11:04 AM  Originating Location (patient location): [place]  Distant Location (provider location):  [facility]  Mode of Communication:  Telephone      Medication Therapy Recommendations  Nutritional disorder    Current Medication: Nutritional Supplements (VITAMIN D BOOSTER PO)   Rationale: Dose too high - Dosage too high - Safety   Recommendation: Decrease Dose - Reduced Vitamin D to 1 capsule daily   Status: Accepted - no CPA Needed

## 2021-01-08 ENCOUNTER — HOSPITAL ENCOUNTER (OUTPATIENT)
Dept: GENERAL RADIOLOGY | Facility: CLINIC | Age: 71
Discharge: HOME | End: 2021-01-08
Attending: UROLOGY | Admitting: UROLOGY
Payer: COMMERCIAL

## 2021-01-08 DIAGNOSIS — N20.0 CALCULUS OF KIDNEY: ICD-10-CM

## 2021-01-08 PROCEDURE — 74019 RADEX ABDOMEN 2 VIEWS: CPT

## 2021-01-11 DIAGNOSIS — N40.1 BENIGN PROSTATIC HYPERPLASIA WITH LOWER URINARY TRACT SYMPTOMS, SYMPTOM DETAILS UNSPECIFIED: Primary | ICD-10-CM

## 2021-01-15 ENCOUNTER — OFFICE VISIT (OUTPATIENT)
Dept: UROLOGY | Facility: CLINIC | Age: 71
End: 2021-01-15
Payer: COMMERCIAL

## 2021-01-15 VITALS
SYSTOLIC BLOOD PRESSURE: 114 MMHG | DIASTOLIC BLOOD PRESSURE: 70 MMHG | BODY MASS INDEX: 37.77 KG/M2 | HEIGHT: 69 IN | HEART RATE: 101 BPM | WEIGHT: 255 LBS | OXYGEN SATURATION: 98 %

## 2021-01-15 DIAGNOSIS — N40.1 BENIGN PROSTATIC HYPERPLASIA WITH LOWER URINARY TRACT SYMPTOMS, SYMPTOM DETAILS UNSPECIFIED: ICD-10-CM

## 2021-01-15 LAB
ALBUMIN UR-MCNC: NEGATIVE MG/DL
APPEARANCE UR: CLEAR
BILIRUB UR QL STRIP: NEGATIVE
COLOR UR AUTO: YELLOW
GLUCOSE UR STRIP-MCNC: >=1000 MG/DL
HGB UR QL STRIP: ABNORMAL
KETONES UR STRIP-MCNC: NEGATIVE MG/DL
LEUKOCYTE ESTERASE UR QL STRIP: NEGATIVE
NITRATE UR QL: NEGATIVE
PH UR STRIP: 5 PH (ref 5–7)
SOURCE: ABNORMAL
SP GR UR STRIP: 1.01 (ref 1–1.03)
UROBILINOGEN UR STRIP-ACNC: 0.2 EU/DL (ref 0.2–1)

## 2021-01-15 PROCEDURE — 81003 URINALYSIS AUTO W/O SCOPE: CPT | Performed by: UROLOGY

## 2021-01-15 PROCEDURE — 99213 OFFICE O/P EST LOW 20 MIN: CPT | Performed by: UROLOGY

## 2021-01-15 ASSESSMENT — PAIN SCALES - GENERAL: PAINLEVEL: NO PAIN (0)

## 2021-01-15 ASSESSMENT — MIFFLIN-ST. JEOR: SCORE: 1899.11

## 2021-01-15 NOTE — PROGRESS NOTES
Te Yoder is a 70-year-old male with no family history of prostate cancer.  He has had normal PSAs in the past.  He is voiding comfortably and has had no dysuria or hematuria.  He has no stones on follow-up KUB.  His urinalysis is unremarkable.  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  No major surgeries  Medications: Aspirin, Farxiga, Glucotrol XL, hydrochlorothiazide, insulin, lisinopril, Metformin, metoprolol, vitamin D, Xarelto, Crestor, vitamin B12  Allergies: Simvastatin  Review of systems: As above  Exam: Alert and oriented, normal appearance, normal vital signs.  Normal respirations, neuro grossly intact.  Normal sphincter tone, no rectal mass or impaction, benign and symmetric prostate that is small, normal seminal vesicles  Assessment: No recurrence of stones, small prostate and no evidence for prostate cancer  Plan: UA, JACQUELINE yearly.  No PSA unless palpable abnormality.  KUB if symptoms

## 2021-01-15 NOTE — LETTER
1/15/2021       RE: Te Yoder  [address]  [place] [address]     Dear Colleague,    Thank you for referring your patient, Te Yoder, to the [facility] [place] at [facility]. Please see a copy of my visit note below.    Te Yodre is a 70-year-old male with no family history of prostate cancer.  He has had normal PSAs in the past.  He is voiding comfortably and has had no dysuria or hematuria.  He has no stones on follow-up KUB.  His urinalysis is unremarkable.  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  No major surgeries  Medications: Aspirin, Farxiga, Glucotrol XL, hydrochlorothiazide, insulin, lisinopril, Metformin, metoprolol, vitamin D, Xarelto, Crestor, vitamin B12  Allergies: Simvastatin  Review of systems: As above  Exam: Alert and oriented, normal appearance, normal vital signs.  Normal respirations, neuro grossly intact.  Normal sphincter tone, no rectal mass or impaction, benign and symmetric prostate that is small, normal seminal vesicles  Assessment: No recurrence of stones, small prostate and no evidence for prostate cancer  Plan: UA, JACQUELINE yearly.  No PSA unless palpable abnormality.  KUB if symptoms    Again, thank you for allowing me to participate in the care of your patient.      Sincerely,    Marques Beach MD

## 2021-02-02 ENCOUNTER — COMMUNICATION - HEALTHEAST (OUTPATIENT)
Dept: FAMILY MEDICINE | Facility: CLINIC | Age: 71
End: 2021-02-02

## 2021-02-02 DIAGNOSIS — I48.19 PERSISTENT ATRIAL FIBRILLATION (H): ICD-10-CM

## 2021-02-03 NOTE — TELEPHONE ENCOUNTER
Metoprolol ER 50 mg tablet    Summary: Take 1 tablet (50 mg) by mouth daily, Disp-90 tablet, R-3, E-Prescribe  Due for an annual physical and labs     Dose, Route, Frequency: 50 mg, Oral, DAILY  Start: 12/27/2019  Ord/Sold: 12/27/2019

## 2021-02-04 RX ORDER — METOPROLOL SUCCINATE 50 MG/1
50 TABLET, EXTENDED RELEASE ORAL DAILY
Qty: 90 TABLET | Refills: 2 | Status: SHIPPED | OUTPATIENT
Start: 2021-02-04 | End: 2021-11-01

## 2021-02-13 DIAGNOSIS — I10 ESSENTIAL HYPERTENSION WITH GOAL BLOOD PRESSURE LESS THAN 140/90: ICD-10-CM

## 2021-02-15 RX ORDER — LISINOPRIL 20 MG/1
TABLET ORAL
Qty: 180 TABLET | Refills: 0 | Status: SHIPPED | OUTPATIENT
Start: 2021-02-15 | End: 2021-06-01

## 2021-04-12 DIAGNOSIS — E11.9 TYPE 2 DIABETES MELLITUS WITHOUT COMPLICATION, WITH LONG-TERM CURRENT USE OF INSULIN (H): ICD-10-CM

## 2021-04-12 DIAGNOSIS — Z79.4 TYPE 2 DIABETES MELLITUS WITHOUT COMPLICATION, WITH LONG-TERM CURRENT USE OF INSULIN (H): ICD-10-CM

## 2021-04-15 ENCOUNTER — TRANSFERRED RECORDS (OUTPATIENT)
Dept: HEALTH INFORMATION MANAGEMENT | Facility: CLINIC | Age: 71
End: 2021-04-15

## 2021-04-15 LAB — HBA1C MFR BLD: 9.2 % (ref 4–6)

## 2021-04-16 ENCOUNTER — TRANSFERRED RECORDS (OUTPATIENT)
Dept: HEALTH INFORMATION MANAGEMENT | Facility: CLINIC | Age: 71
End: 2021-04-16

## 2021-05-12 ENCOUNTER — OFFICE VISIT (OUTPATIENT)
Dept: PHARMACY | Facility: CLINIC | Age: 71
End: 2021-05-12
Payer: COMMERCIAL

## 2021-05-12 VITALS
BODY MASS INDEX: 37.76 KG/M2 | WEIGHT: 252 LBS | HEART RATE: 88 BPM | DIASTOLIC BLOOD PRESSURE: 65 MMHG | SYSTOLIC BLOOD PRESSURE: 111 MMHG

## 2021-05-12 DIAGNOSIS — Z79.4 TYPE 2 DIABETES MELLITUS WITH DIABETIC NEPHROPATHY, WITH LONG-TERM CURRENT USE OF INSULIN (H): Primary | ICD-10-CM

## 2021-05-12 DIAGNOSIS — E63.9 NUTRITIONAL DISORDER: ICD-10-CM

## 2021-05-12 DIAGNOSIS — I48.91 ATRIAL FIBRILLATION, UNSPECIFIED TYPE (H): ICD-10-CM

## 2021-05-12 DIAGNOSIS — E78.5 HYPERLIPIDEMIA LDL GOAL <100: ICD-10-CM

## 2021-05-12 DIAGNOSIS — E11.21 TYPE 2 DIABETES MELLITUS WITH DIABETIC NEPHROPATHY, WITH LONG-TERM CURRENT USE OF INSULIN (H): Primary | ICD-10-CM

## 2021-05-12 DIAGNOSIS — R52 INTERMITTENT PAIN: ICD-10-CM

## 2021-05-12 DIAGNOSIS — I10 ESSENTIAL HYPERTENSION: ICD-10-CM

## 2021-05-12 DIAGNOSIS — Z71.85 VACCINE COUNSELING: ICD-10-CM

## 2021-05-12 PROCEDURE — 99207 PR NO CHARGE LOS: CPT | Performed by: PHARMACIST

## 2021-05-12 RX ORDER — SEMAGLUTIDE 1.34 MG/ML
0.5 INJECTION, SOLUTION SUBCUTANEOUS WEEKLY
COMMUNITY
End: 2022-03-21

## 2021-05-12 RX ORDER — SENNOSIDES 8.6 MG
1300 CAPSULE ORAL EVERY 8 HOURS PRN
COMMUNITY

## 2021-05-12 NOTE — PATIENT INSTRUCTIONS
Recommendations from today's MTM visit:                                                    MTM (medication therapy management) is a service provided by a clinical pharmacist designed to help you get the most of out of your medicines.   Today we reviewed what your medicines are for, how to know if they are working, that your medicines are safe and how to make your medicine regimen as easy as possible.      1.  Aim for an average of Vitamin D 1000 international unit(s) daily.    2.  Talk with the CDE at endocrinology about the low blood sugars.  We may need to reduce the VGo to VGo 20.      Follow-up: Return in 14 weeks (on 8/18/2021) for Routine Visit at 2:30pm here in clinic.    It was great to speak with you today.  I value your experience and would be very thankful for your time with providing feedback on our clinic survey. You may receive a survey via email or text message in the next few days.     To schedule another MTM appointment, please call the clinic directly or you may call the MTM scheduling line at [phone] or toll-free at [phone].     My Clinical Pharmacist's contact information:                                                      Please feel free to contact me with any questions or concerns you have.      Tosin Gillette PharmD, [facility]  Medication Therapy Management Provider  Pager: [phone]     Lisseth Tinoco PharmD  Medication Therapy Management Resident  Pager: [phone]

## 2021-05-12 NOTE — PROGRESS NOTES
Medication Therapy Management (MTM) Encounter    ASSESSMENT:                            Medication Adherence/Access: No issues identified    Type 2 Diabetes: Patient is not meeting A1c goal of < 7%. Self monitoring of blood glucose is not at goal of fasting  mg/dL and post prandial < 180 mg/dL, has actually been quite low the last week.  He's not using bolus clicks with VGo - may need to reduce to VGo 20, particularly as Ozempic dose is escalated.  Hopefully he can get off insulin therapy completely to help with weight loss.      Hypertension/A. Fib: Stable. Patient is meeting blood pressure goal of < 130/80mmHg.     Hyperlipidemia: Stable.  Patient is on high intensity statin which is indicated based on 2019 ACC/AHA guidelines for lipid management.       Pain: Stable.    Supplements:  Last Vitamin D level was quite high, would benefit from averaging to about 1000 international unit(s) of Vitamin D per day.    Immunizations:  Stable.    PLAN:                            1.  Aim for an average of Vitamin D 1000 international unit(s) daily.  2.  Talk with the CDE at endocrinology about the low blood sugars.  We may need to reduce the VGo to VGo 20.      Follow-up: Return in 14 weeks (on 8/18/2021) for Routine Visit at 2:30pm here in clinic.    SUBJECTIVE/OBJECTIVE:                          Te Yoder is a 70 year old male coming in for a follow-up visit. He was referred to me from Dr. Brown, was most recently seeing Dr. Kim and has an appointment scheduled to establish care with Dr. Tam on 6/1.  Today's visit is a follow-up MTM visit from 1/5/2021.    Reason for visit: Routine f/up.    Tobacco: He reports that he has never smoked. He has never used smokeless tobacco.  Alcohol: 1-3 beverages / week    Medication Adherence/Access: no issues reported    Diabetes:  Patient continues working closely with endocrinology - is now seeing Dr. Chairez.  He's had several medication adjustments since our last visit.   "He's currently taking metformin 1000mg twice daily, Farxiga 10mg daily, Ozempic 0.25mg weekly and Humalog via VGo 20.  He has not been using bolus \"clicks\" with VGo due to hypoglycemia (see below). Pt is not experiencing side effects.  SMBG: Patient uses Freestyle Radha which he continues to enjoy using.          Daily logs:          Symptoms of low blood sugar? Sweaty, shaky. Frequency of hypoglycemia? He's been having lots more hypoglycemia this last week.    Recent symptoms of high blood sugar? Neuropathy.  Eye exam: up to date  Foot exam: up to date  Microalbumin is not < 30 mg/g. Pt is taking an ACEi/ARB.  Lab Results   Component Value Date    UMALCR 117.68 (H) 12/29/2020   Aspirin: Not taking due to Xarelto use  Diet/Exercise:  He's back to working this summer, so gets some physical activity there.  Lab Results   Component Value Date    A1C 9.2 04/15/2021    A1C 8.4 12/29/2020    A1C 7.3 07/06/2020    A1C 7.5 06/03/2019    A1C 7.3 02/05/2018     GFR Estimate   Date Value Ref Range Status   12/29/2020 67 >60 mL/min/[1.73_m2] Final     Comment:     Non  GFR Calc  Starting 12/18/2018, serum creatinine based estimated GFR (eGFR) will be   calculated using the Chronic Kidney Disease Epidemiology Collaboration   (CKD-EPI) equation.       Hypertension/A. Fib: Current medications include hydrochlorothiazide 25mg daily, lisionpril 20mg twice daily, metoprolol XL 50mg daily and Xarelto 20mg daily.  Patient does not self-monitor BP.  Patient reports no medication side effects including signs and symptoms bruising/bleeding.  BP Readings from Last 3 Encounters:   01/15/21 114/70   12/29/20 111/72   07/06/20 119/64         Hyperlipidemia: Current therapy includes rosuvastatin 20mg. Patient denies muscle pains or other side effects.  The 10-year ASCVD risk score (Mitra SANTAMARIA Jr., et al., 2013) is: 31.1%    Values used to calculate the score:      Age: 70 years      Sex: Male      Is Non- : " No      Diabetic: Yes      Tobacco smoker: No      Systolic Blood Pressure: 114 mmHg      Is BP treated: Yes      HDL Cholesterol: 34 mg/dL      Total Cholesterol: 146 mg/dL     Recent Labs   Lab Test 12/29/20  1020 07/06/20  1053 01/30/15  0808 01/30/15  0808   CHOL 146 117   < > 123   HDL 34* 31*   < > 32*   LDL 89 65   < > 68   TRIG 114 107   < > 113   CHOLHDLRATIO  --   --   --  3.8    < > = values in this interval not displayed.     Pain:  Te has been taking acetaminophen 1300mg every morning, and an additional dose during the day if needed.  He finds this effective and denies side effects.    Supplements: Current supplements include Vitamin D and vitamin B-12 2500mcg daily.  He's unsure what dose of Vitamin D he's been taking. He denies side effects.   Lab Results   Component Value Date    B12 674 12/29/2020      Vitamin D Deficiency Screening Results:  Lab Results   Component Value Date    VITDT 136 (H) 12/29/2020    VITDT 69 12/28/2017    VITDT 67 12/12/2013      Immunizations:  Te has received 2 COVID-19 vaccine doses (Moderna - 1/10/21 and 2/7/21).  He received high dose influenza vaccine fall 2020.  He has received Pneumovax since turning 65 and has received Prevnar.  He's received 2 doses of Shingrix.  Last tetanus was in 2012.    Today's Vitals: /65   Pulse 88   Wt 252 lb (114.3 kg)   BMI 37.76 kg/m    ----------------    I spent 60 minutes with this patient today. A copy of the visit note was provided to the patient's primary care provider.    The patient was given a summary of these recommendations.     Tosin Gillette, PharmD, [facility]  Medication Therapy Management Provider  Pager: [phone]       Medication Therapy Recommendations  Diabetes mellitus due to underlying condition with diabetic nephropathy, without long-term current use of insulin (H)    Current Medication: insulin lispro (HUMALOG VIAL) 100 UNIT/ML vial   Rationale: Dose too high - Dosage too high - Safety   Recommendation:  Decrease Dose - Recommended talking with endocrinology about reducing Humalog administered via VGo   Status: Contact Provider - Awaiting Response         Nutritional disorder    Current Medication: Vitamin D, Cholecalciferol, 25 MCG (1000 UT) TABS   Rationale: Dose too high - Dosage too high - Safety   Recommendation: Decrease Dose - Recommended reducing Vitamin D to 1000 IU daily   Status: Accepted - no CPA Needed

## 2021-05-16 DIAGNOSIS — Z79.4 TYPE 2 DIABETES MELLITUS WITHOUT COMPLICATION, WITH LONG-TERM CURRENT USE OF INSULIN (H): ICD-10-CM

## 2021-05-16 DIAGNOSIS — E11.9 TYPE 2 DIABETES MELLITUS WITHOUT COMPLICATION, WITH LONG-TERM CURRENT USE OF INSULIN (H): ICD-10-CM

## 2021-05-18 NOTE — TELEPHONE ENCOUNTER
Rx pended - routing to Dr. Tam for signature (patient has appt scheduled to establish care on 6/1).  Patient is working with endocrinology on adjustments to other diabetes medications.  I'm unable to sign this since Te hasn't established with Dr. Tma yet.    Tosin Gillette, PharmD, [facility]  Medication Therapy Management Provider  Pager: [phone]

## 2021-05-18 NOTE — TELEPHONE ENCOUNTER
Tosin Last visit with you 5/12/21.    Can you please refill this as is appropriate.    Please reset Qty and refills as appropriate.    Thank you.    Sola Zavaleta RN  [facility]

## 2021-06-01 ENCOUNTER — OFFICE VISIT (OUTPATIENT)
Dept: FAMILY MEDICINE | Facility: CLINIC | Age: 71
End: 2021-06-01
Payer: COMMERCIAL

## 2021-06-01 VITALS
BODY MASS INDEX: 37.47 KG/M2 | HEIGHT: 69 IN | HEART RATE: 87 BPM | DIASTOLIC BLOOD PRESSURE: 65 MMHG | WEIGHT: 253 LBS | TEMPERATURE: 97.5 F | OXYGEN SATURATION: 97 % | SYSTOLIC BLOOD PRESSURE: 111 MMHG

## 2021-06-01 DIAGNOSIS — I48.91 ATRIAL FIBRILLATION, UNSPECIFIED TYPE (H): ICD-10-CM

## 2021-06-01 DIAGNOSIS — I10 ESSENTIAL HYPERTENSION WITH GOAL BLOOD PRESSURE LESS THAN 140/90: ICD-10-CM

## 2021-06-01 DIAGNOSIS — I25.10 CORONARY ARTERY CALCIFICATION: ICD-10-CM

## 2021-06-01 DIAGNOSIS — E53.8 B12 DEFICIENCY: ICD-10-CM

## 2021-06-01 DIAGNOSIS — R93.89 ABNORMAL FINDINGS ON DIAGNOSTIC IMAGING OF OTHER SPECIFIED BODY STRUCTURES: ICD-10-CM

## 2021-06-01 DIAGNOSIS — E83.09 OTHER DISORDERS OF COPPER METABOLISM (H): ICD-10-CM

## 2021-06-01 DIAGNOSIS — G62.9 PERIPHERAL POLYNEUROPATHY: ICD-10-CM

## 2021-06-01 DIAGNOSIS — E08.21 DIABETES MELLITUS DUE TO UNDERLYING CONDITION WITH DIABETIC NEPHROPATHY, WITHOUT LONG-TERM CURRENT USE OF INSULIN (H): Primary | ICD-10-CM

## 2021-06-01 LAB
BASOPHILS # BLD AUTO: 0 10E9/L (ref 0–0.2)
BASOPHILS NFR BLD AUTO: 0.3 %
DIFFERENTIAL METHOD BLD: NORMAL
EOSINOPHIL # BLD AUTO: 0.2 10E9/L (ref 0–0.7)
EOSINOPHIL NFR BLD AUTO: 2.1 %
ERYTHROCYTE [DISTWIDTH] IN BLOOD BY AUTOMATED COUNT: 14 % (ref 10–15)
HBA1C MFR BLD: 8.6 % (ref 0–5.6)
HCT VFR BLD AUTO: 45.7 % (ref 40–53)
HGB BLD-MCNC: 15.1 G/DL (ref 13.3–17.7)
LYMPHOCYTES # BLD AUTO: 1.3 10E9/L (ref 0.8–5.3)
LYMPHOCYTES NFR BLD AUTO: 17.4 %
MCH RBC QN AUTO: 31.1 PG (ref 26.5–33)
MCHC RBC AUTO-ENTMCNC: 33 G/DL (ref 31.5–36.5)
MCV RBC AUTO: 94 FL (ref 78–100)
MONOCYTES # BLD AUTO: 0.6 10E9/L (ref 0–1.3)
MONOCYTES NFR BLD AUTO: 7.8 %
NEUTROPHILS # BLD AUTO: 5.5 10E9/L (ref 1.6–8.3)
NEUTROPHILS NFR BLD AUTO: 72.4 %
PLATELET # BLD AUTO: 157 10E9/L (ref 150–450)
RBC # BLD AUTO: 4.86 10E12/L (ref 4.4–5.9)
WBC # BLD AUTO: 7.7 10E9/L (ref 4–11)

## 2021-06-01 PROCEDURE — 80053 COMPREHEN METABOLIC PANEL: CPT | Performed by: INTERNAL MEDICINE

## 2021-06-01 PROCEDURE — 85025 COMPLETE CBC W/AUTO DIFF WBC: CPT | Performed by: INTERNAL MEDICINE

## 2021-06-01 PROCEDURE — 99214 OFFICE O/P EST MOD 30 MIN: CPT | Performed by: INTERNAL MEDICINE

## 2021-06-01 PROCEDURE — 36415 COLL VENOUS BLD VENIPUNCTURE: CPT | Performed by: INTERNAL MEDICINE

## 2021-06-01 PROCEDURE — 82525 ASSAY OF COPPER: CPT | Mod: 90 | Performed by: INTERNAL MEDICINE

## 2021-06-01 PROCEDURE — 82746 ASSAY OF FOLIC ACID SERUM: CPT | Performed by: INTERNAL MEDICINE

## 2021-06-01 PROCEDURE — 83921 ORGANIC ACID SINGLE QUANT: CPT | Performed by: INTERNAL MEDICINE

## 2021-06-01 PROCEDURE — 83036 HEMOGLOBIN GLYCOSYLATED A1C: CPT | Performed by: INTERNAL MEDICINE

## 2021-06-01 PROCEDURE — 99000 SPECIMEN HANDLING OFFICE-LAB: CPT | Performed by: INTERNAL MEDICINE

## 2021-06-01 PROCEDURE — 84443 ASSAY THYROID STIM HORMONE: CPT | Performed by: INTERNAL MEDICINE

## 2021-06-01 RX ORDER — ROSUVASTATIN CALCIUM 20 MG/1
20 TABLET, COATED ORAL DAILY
Qty: 90 TABLET | Refills: 3 | Status: SHIPPED | OUTPATIENT
Start: 2021-06-01 | End: 2022-05-16

## 2021-06-01 RX ORDER — LISINOPRIL 20 MG/1
TABLET ORAL
Qty: 180 TABLET | Refills: 0 | Status: SHIPPED | OUTPATIENT
Start: 2021-06-01 | End: 2021-08-31

## 2021-06-01 ASSESSMENT — MIFFLIN-ST. JEOR: SCORE: 1890.04

## 2021-06-01 NOTE — PATIENT INSTRUCTIONS
Mr. Yoder;  The most critical aspect of your health right now is blood sugar control.  We will assess a hemoglobin A1c today.  Controlling your diabetes will reduce progression of neuropathy and perhaps improve it as well.  I would like to assess a couple of laboratory studies to rule out other causes for neuropathy.    I would like to see you again in 6 months or 3 months dependent on the hemoglobin A1c level.    Regards  Te

## 2021-06-01 NOTE — PROGRESS NOTES
"    Assessment & Plan     Essential hypertension with goal blood pressure less than 140/90  This is a patient with essential hypertension.  His medication will be refilled.  Blood pressure control is superb.  No orthostatic changes  - lisinopril (ZESTRIL) 20 MG tablet; TAKE ONE TABLET BY MOUTH TWICE DAILY    Coronary artery calcification  Stable.  No chest pain.  - rosuvastatin (CRESTOR) 20 MG tablet; Take 1 tablet (20 mg) by mouth daily    Diabetes mellitus due to underlying condition with diabetic nephropathy, without long-term current use of insulin (H)  Hemoglobin A1c has been elevated.  We discussed the importance of blood glucose control particularly in light of his underlying peripheral neuropathy  - Hemoglobin A1c  - CBC with platelets and differential  - Comprehensive metabolic panel (BMP + Alb, Alk Phos, ALT, AST, Total. Bili, TP)    Atrial fibrillation, unspecified type (H)  Stable    B12 deficiency  Stable recent B12 levels normal, nevertheless a methylmalonic acid level will be obtained  Peripheral polyneuropathy  Likely related to diabetes.  Rule out other factors  - TSH with free T4 reflex  - Folate  - Copper level  - Methylmalonic Acid    Abnormal findings on diagnostic imaging of other specified body structures     - TSH with free T4 reflex    Other disorders of copper metabolism   Patient with lower extremity neuropathy of uncertain etiology  - Copper level             BMI:   Estimated body mass index is 37.91 kg/m  as calculated from the following:    Height as of this encounter: 1.74 m (5' 8.5\").    Weight as of this encounter: 114.8 kg (253 lb).   Patient working on diet and exercise.  He is a seasonal worker.  Has lost weight with exercise and exertion this summer    See Patient Instructions    Return in about 6 months (around 12/1/2021) for Routine preventive.    Te Tam MD  [facility] 70 [facility] who presents for the following health " issues     HPI 70-year-old male presents to clinic today to establish care.  He was previously seen by a colleague of mine.    He has a history of diabetes and peripheral neuropathy.  Fortunately he is up-to-date with his eye examination.  Regarding his peripheral neuropathy he still has sensation of the lower extremities.  He has chronic lower extremity edema related to obesity and venous insufficiency.    He denies chest pain or shortness of breath.  Mood is reasonable.  He lives by himself.  He does have a girlfriend.    New Patient/Transfer of Care      Review of Systems   Constitutional, HEENT, cardiovascular, pulmonary, gi and gu systems are negative, except as otherwise noted.      Objective    There were no vitals taken for this visit.  There is no height or weight on file to calculate BMI.  Physical Exam   Alert patient no apparent distress extraocular movements intact no carotid bruits  Lungs are clear  Heart regular rate and rhythm  Lower extremities 1+ edema bilateral venous stasis changes bilateral sensorium is intact to monofilament.  No skin breakdown noted on examination  Affect and mood appropriate    Transferred Records on 04/15/2021   Component Date Value Ref Range Status     Hemoglobin A1C 04/15/2021 9.2* 4.0 - 6.0 % Final

## 2021-06-02 LAB
ALBUMIN SERPL-MCNC: 3.8 G/DL (ref 3.4–5)
ALP SERPL-CCNC: 60 U/L (ref 40–150)
ALT SERPL W P-5'-P-CCNC: 27 U/L (ref 0–70)
ANION GAP SERPL CALCULATED.3IONS-SCNC: 6 MMOL/L (ref 3–14)
AST SERPL W P-5'-P-CCNC: 20 U/L (ref 0–45)
BILIRUB SERPL-MCNC: 0.6 MG/DL (ref 0.2–1.3)
BUN SERPL-MCNC: 23 MG/DL (ref 7–30)
CALCIUM SERPL-MCNC: 9.2 MG/DL (ref 8.5–10.1)
CHLORIDE SERPL-SCNC: 105 MMOL/L (ref 94–109)
CO2 SERPL-SCNC: 28 MMOL/L (ref 20–32)
CREAT SERPL-MCNC: 1.19 MG/DL (ref 0.66–1.25)
FOLATE SERPL-MCNC: 13.6 NG/ML
GFR SERPL CREATININE-BSD FRML MDRD: 61 ML/MIN/{1.73_M2}
GLUCOSE SERPL-MCNC: 132 MG/DL (ref 70–99)
POTASSIUM SERPL-SCNC: 3.9 MMOL/L (ref 3.4–5.3)
PROT SERPL-MCNC: 7.3 G/DL (ref 6.8–8.8)
SODIUM SERPL-SCNC: 139 MMOL/L (ref 133–144)
TSH SERPL DL<=0.005 MIU/L-ACNC: 3.28 MU/L (ref 0.4–4)

## 2021-06-03 RX ORDER — LISINOPRIL 20 MG/1
TABLET ORAL
Qty: 180 TABLET | Refills: 0 | OUTPATIENT
Start: 2021-06-03

## 2021-06-04 LAB — COPPER SERPL-MCNC: 87.4 UG/DL (ref 70–140)

## 2021-06-10 LAB — METHYLMALONATE SERPL-SCNC: 0.16 UMOL/L (ref 0–0.4)

## 2021-07-01 ENCOUNTER — TRANSFERRED RECORDS (OUTPATIENT)
Dept: HEALTH INFORMATION MANAGEMENT | Facility: CLINIC | Age: 71
End: 2021-07-01

## 2021-07-01 LAB — RETINOPATHY: POSITIVE

## 2021-07-29 ENCOUNTER — TRANSFERRED RECORDS (OUTPATIENT)
Dept: HEALTH INFORMATION MANAGEMENT | Facility: CLINIC | Age: 71
End: 2021-07-29

## 2021-07-29 LAB — RETINOPATHY: POSITIVE

## 2021-08-18 ENCOUNTER — OFFICE VISIT (OUTPATIENT)
Dept: PHARMACY | Facility: CLINIC | Age: 71
End: 2021-08-18
Payer: COMMERCIAL

## 2021-08-18 VITALS
HEART RATE: 88 BPM | BODY MASS INDEX: 37.01 KG/M2 | DIASTOLIC BLOOD PRESSURE: 58 MMHG | WEIGHT: 247 LBS | SYSTOLIC BLOOD PRESSURE: 99 MMHG

## 2021-08-18 DIAGNOSIS — E63.9 NUTRITIONAL DISORDER: ICD-10-CM

## 2021-08-18 DIAGNOSIS — I10 ESSENTIAL HYPERTENSION: ICD-10-CM

## 2021-08-18 DIAGNOSIS — I48.91 ATRIAL FIBRILLATION, UNSPECIFIED TYPE (H): ICD-10-CM

## 2021-08-18 DIAGNOSIS — Z79.4 TYPE 2 DIABETES MELLITUS WITH DIABETIC NEPHROPATHY, WITH LONG-TERM CURRENT USE OF INSULIN (H): Primary | ICD-10-CM

## 2021-08-18 DIAGNOSIS — E78.5 HYPERLIPIDEMIA LDL GOAL <100: ICD-10-CM

## 2021-08-18 DIAGNOSIS — E11.21 TYPE 2 DIABETES MELLITUS WITH DIABETIC NEPHROPATHY, WITH LONG-TERM CURRENT USE OF INSULIN (H): Primary | ICD-10-CM

## 2021-08-18 DIAGNOSIS — R52 INTERMITTENT PAIN: ICD-10-CM

## 2021-08-18 PROCEDURE — 99207 PR NO CHARGE LOS: CPT | Performed by: PHARMACIST

## 2021-08-18 NOTE — PROGRESS NOTES
Medication Therapy Management (MTM) Encounter    ASSESSMENT:                            Medication Adherence/Access: No issues identified    Type 2 Diabetes: Patient is not meeting A1c goal of < 7%. Self monitoring of blood glucose is not at goal of fasting  mg/dL and post prandial < 180 mg/dL, has had persistent hypoglycemia for a few days in the last 2 weeks.  May need to reduce to VGo 20, particularly as Ozempic dose is escalated.       Hypertension/A. Fib: Stable. Patient is meeting blood pressure goal of < 130/80mmHg; blood pressure is on the low side today, he is asymptomatic.  Will continue to monitor; he may be a bit dehydrated after working outside all day today.     Hyperlipidemia: Stable.  Patient is on high intensity statin which is indicated based on 2019 ACC/AHA guidelines for lipid management.       Pain: Stable.    Supplements:  Stable.    PLAN:                            1.  If the low blood sugars continue - please let the diabetes educator with Dr. Chairez know.  We may need to reduce the VGo to VGo 20 and increase Ozempic to 1mg.    Follow-up: Return in 13 weeks (on 11/17/2021) for diabetes follow-up.    SUBJECTIVE/OBJECTIVE:                          Te Yoder is a 70 year old male coming in for a follow-up visit. He was referred to me from Dr. Brown, has since established care with Dr. Tam.  Today's visit is a follow-up MTM visit from 5/12/21.     Reason for visit: Routine f/up.    Tobacco: He reports that he has never smoked. He has never used smokeless tobacco.  Alcohol: 1-3 beverages / week      Medication Adherence/Access: no issues reported    Diabetes:  Patient continues working closely with endocrinology - is now seeing Dr. Chairez.  He's currently taking metformin 1000mg twice daily, Farxiga 10mg daily, Ozempic 0.5mg weekly (he's been on this dose for 3 weeks) and Humalog via VGo 30.  Pt is not experiencing side effects.  SMBG: Patient uses Freestyle Radha which he continues to  enjoy using.                Symptoms of low blood sugar? Sweaty, shaky. Frequency of hypoglycemia? Rarely, but as above did have 3 days of persistent hypoglycemia 8/9-8/11.  He doesn't believe anything was out of the ordinary during that time.  Recent symptoms of high blood sugar? Neuropathy.  Eye exam: up to date  Foot exam: due  Microalbumin is not < 30 mg/g. Pt is taking an ACEi/ARB.  Lab Results   Component Value Date    UMALCR 117.68 (H) 12/29/2020   Aspirin: Not taking due to Xarelto use  Diet/Exercise:  He's back to working this summer, so gets some physical activity there.  Lab Results   Component Value Date    A1C 8.6 06/01/2021    A1C 9.2 04/15/2021    A1C 8.4 12/29/2020    A1C 7.3 07/06/2020    A1C 7.5 06/03/2019     GFR Estimate   Date Value Ref Range Status   06/01/2021 61 >60 mL/min/[1.73_m2] Final     Comment:     Non  GFR Calc  Starting 12/18/2018, serum creatinine based estimated GFR (eGFR) will be   calculated using the Chronic Kidney Disease Epidemiology Collaboration   (CKD-EPI) equation.       Hypertension/A. Fib: Current medications include hydrochlorothiazide 25mg daily, lisinopril 20mg twice daily, metoprolol XL 50mg daily and Xarelto 20mg daily.  Patient does not self-monitor BP.  Patient reports no medication side effects including signs and symptoms bruising/bleeding.  BP Readings from Last 3 Encounters:   08/18/21 99/58   06/01/21 111/65   05/12/21 111/65          Hyperlipidemia: Current therapy includes rosuvastatin 20mg. Patient denies muscle pains or other side effects.  The 10-year ASCVD risk score (Mitra HINA Jr., et al., 2013) is: 25.1%    Values used to calculate the score:      Age: 70 years      Sex: Male      Is Non- : No      Diabetic: Yes      Tobacco smoker: No      Systolic Blood Pressure: 99 mmHg      Is BP treated: Yes      HDL Cholesterol: 34 mg/dL      Total Cholesterol: 146 mg/dL     Recent Labs   Lab Test 12/29/20  1020  07/06/20  1053 06/10/15  0000 01/30/15  0808   CHOL 146 117  --  123   HDL 34* 31*  --  32*   LDL 89 65   < > 68   TRIG 114 107  --  113   CHOLHDLRATIO  --   --   --  3.8    < > = values in this interval not displayed.      Pain:  Te has been taking acetaminophen 1300mg every morning, and an additional dose during the day if needed (rarely).  He finds this effective and denies side effects.    Supplements: Current supplements include Vitamin D and vitamin B-12 2500mcg, both now 3x/week.  He denies side effects.   Lab Results   Component Value Date    B12 674 12/29/2020      Vitamin D Deficiency Screening Results:  Lab Results   Component Value Date    VITDT 136 (H) 12/29/2020    VITDT 69 12/28/2017    VITDT 67 12/12/2013       Today's Vitals: BP 99/58   Pulse 88   Wt 247 lb (112 kg)   BMI 37.01 kg/m    ----------------    I spent 45 minutes with this patient today. I offer these suggestions for consideration by Dr. Chairez. A copy of the visit note was provided to the patient's primary care and endocrinology provider.    The patient was given a summary of these recommendations.     Tosin Gillette, PharmD, [facility]  Medication Therapy Management Provider  Pager: [phone]      Medication Therapy Recommendations  No medication therapy recommendations to display

## 2021-08-18 NOTE — PATIENT INSTRUCTIONS
Recommendations from today's MTM visit:                                                    MTM (medication therapy management) is a service provided by a clinical pharmacist designed to help you get the most of out of your medicines.   Today we reviewed what your medicines are for, how to know if they are working, that your medicines are safe and how to make your medicine regimen as easy as possible.      1.  If the low blood sugars continue - please let the diabetes educator with Dr. Chairez know.  We may need to reduce the VGo to VGo 20 and increase Ozempic to 1mg.    Follow-up: 3 months, sooner if needed    It was great to speak with you today.  I value your experience and would be very thankful for your time with providing feedback on our clinic survey. You may receive a survey via email or text message in the next few days.     To schedule another MTM appointment, please call the clinic directly or you may call the MTM scheduling line at [phone] or toll-free at [phone].     My Clinical Pharmacist's contact information:                                                      Please feel free to contact me with any questions or concerns you have.      Lainey YañezD, [facility]  Medication Therapy Management Provider  Pager: [phone]     Mary Hudson, Elvia  Medication Therapy Management Resident  Pager: [phone]

## 2021-09-15 ENCOUNTER — TRANSFERRED RECORDS (OUTPATIENT)
Dept: HEALTH INFORMATION MANAGEMENT | Facility: CLINIC | Age: 71
End: 2021-09-15

## 2021-09-15 LAB
ALBUMIN (URINE) MG/SPEC: 44.6 UG/ML
ALBUMIN/CREATININE RATIO: 90 MG/G CREAT (ref 0–29)
CHOLESTEROL (EXTERNAL): 137 MG/DL (ref 100–199)
CREATININE (EXTERNAL): 1.05 MG/DL (ref 0.76–1.27)
CREATININE (URINE): 49.5 MG/DL
GFR ESTIMATED (EXTERNAL): 72 ML/MIN/1.7
GFR ESTIMATED (IF AFRICAN AMERICAN) (EXTERNAL): 83 ML/MIN/1.7
GLUCOSE (EXTERNAL): 184 MG/DL (ref 65–99)
HBA1C MFR BLD: 8.7 % (ref 4–6)
HDLC SERPL-MCNC: 32 MG/DL
LDL CHOLESTEROL CALCULATED (EXTERNAL): 59 MG/DL (ref 0–99)
POTASSIUM (EXTERNAL): 4 MMOL/L (ref 3.5–5.2)
TRIGLYCERIDES (EXTERNAL): 291 MG/DL (ref 0–149)

## 2021-10-24 ENCOUNTER — HEALTH MAINTENANCE LETTER (OUTPATIENT)
Age: 71
End: 2021-10-24

## 2021-10-31 DIAGNOSIS — I48.19 PERSISTENT ATRIAL FIBRILLATION (H): ICD-10-CM

## 2021-11-01 RX ORDER — METOPROLOL SUCCINATE 50 MG/1
50 TABLET, EXTENDED RELEASE ORAL DAILY
Qty: 90 TABLET | Refills: 0 | Status: SHIPPED | OUTPATIENT
Start: 2021-11-01 | End: 2022-03-22

## 2021-11-01 NOTE — TELEPHONE ENCOUNTER
Prescription approved per [facility] Refill Protocol.    Mary OLIVIER RN  EP Triage

## 2021-11-03 DIAGNOSIS — I48.91 ATRIAL FIBRILLATION, UNSPECIFIED TYPE (H): ICD-10-CM

## 2021-11-04 RX ORDER — RIVAROXABAN 20 MG/1
TABLET, FILM COATED ORAL
Qty: 90 TABLET | Refills: 0 | Status: SHIPPED | OUTPATIENT
Start: 2021-11-04 | End: 2022-02-22

## 2021-11-04 NOTE — TELEPHONE ENCOUNTER
Routing refill request to provider for review/approval because:    Creatinine   Date Value Ref Range Status   06/01/2021 1.19 0.66 - 1.25 mg/dL Final     Last office vist: 6/1/2021    Pended 90 day supply & 0 refills. Please Review.    Next office visit:   NOV 11 2021 01:30 PM - PHYSICAL  [facility] - MD Mahi Montana RN  [facility]

## 2021-11-11 ENCOUNTER — OFFICE VISIT (OUTPATIENT)
Dept: FAMILY MEDICINE | Facility: CLINIC | Age: 71
End: 2021-11-11
Payer: COMMERCIAL

## 2021-11-11 VITALS
HEART RATE: 98 BPM | TEMPERATURE: 97.3 F | WEIGHT: 241 LBS | SYSTOLIC BLOOD PRESSURE: 138 MMHG | OXYGEN SATURATION: 100 % | BODY MASS INDEX: 36.53 KG/M2 | RESPIRATION RATE: 17 BRPM | HEIGHT: 68 IN | DIASTOLIC BLOOD PRESSURE: 86 MMHG

## 2021-11-11 DIAGNOSIS — I10 ESSENTIAL HYPERTENSION: ICD-10-CM

## 2021-11-11 DIAGNOSIS — Z12.5 ENCOUNTER FOR SCREENING FOR MALIGNANT NEOPLASM OF PROSTATE: ICD-10-CM

## 2021-11-11 DIAGNOSIS — Z00.00 ENCOUNTER FOR PREVENTIVE CARE: Primary | ICD-10-CM

## 2021-11-11 LAB
BASOPHILS # BLD AUTO: 0 10E3/UL (ref 0–0.2)
BASOPHILS NFR BLD AUTO: 0 %
EOSINOPHIL # BLD AUTO: 0.1 10E3/UL (ref 0–0.7)
EOSINOPHIL NFR BLD AUTO: 2 %
ERYTHROCYTE [DISTWIDTH] IN BLOOD BY AUTOMATED COUNT: 13.9 % (ref 10–15)
FOLATE SERPL-MCNC: 11.1 NG/ML
HCT VFR BLD AUTO: 49.8 % (ref 40–53)
HGB BLD-MCNC: 16.2 G/DL (ref 13.3–17.7)
LYMPHOCYTES # BLD AUTO: 1.3 10E3/UL (ref 0.8–5.3)
LYMPHOCYTES NFR BLD AUTO: 16 %
MCH RBC QN AUTO: 30.7 PG (ref 26.5–33)
MCHC RBC AUTO-ENTMCNC: 32.5 G/DL (ref 31.5–36.5)
MCV RBC AUTO: 95 FL (ref 78–100)
MONOCYTES # BLD AUTO: 0.5 10E3/UL (ref 0–1.3)
MONOCYTES NFR BLD AUTO: 7 %
NEUTROPHILS # BLD AUTO: 5.8 10E3/UL (ref 1.6–8.3)
NEUTROPHILS NFR BLD AUTO: 75 %
PLATELET # BLD AUTO: 155 10E3/UL (ref 150–450)
RBC # BLD AUTO: 5.27 10E6/UL (ref 4.4–5.9)
VIT B12 SERPL-MCNC: 760 PG/ML (ref 193–986)
WBC # BLD AUTO: 7.7 10E3/UL (ref 4–11)

## 2021-11-11 PROCEDURE — 82746 ASSAY OF FOLIC ACID SERUM: CPT | Performed by: INTERNAL MEDICINE

## 2021-11-11 PROCEDURE — 99397 PER PM REEVAL EST PAT 65+ YR: CPT | Performed by: INTERNAL MEDICINE

## 2021-11-11 PROCEDURE — 85025 COMPLETE CBC W/AUTO DIFF WBC: CPT | Performed by: INTERNAL MEDICINE

## 2021-11-11 PROCEDURE — 80053 COMPREHEN METABOLIC PANEL: CPT | Performed by: INTERNAL MEDICINE

## 2021-11-11 PROCEDURE — G0103 PSA SCREENING: HCPCS | Performed by: INTERNAL MEDICINE

## 2021-11-11 PROCEDURE — 82607 VITAMIN B-12: CPT | Performed by: INTERNAL MEDICINE

## 2021-11-11 PROCEDURE — 82465 ASSAY BLD/SERUM CHOLESTEROL: CPT | Performed by: INTERNAL MEDICINE

## 2021-11-11 PROCEDURE — 83036 HEMOGLOBIN GLYCOSYLATED A1C: CPT | Performed by: INTERNAL MEDICINE

## 2021-11-11 PROCEDURE — 36415 COLL VENOUS BLD VENIPUNCTURE: CPT | Performed by: INTERNAL MEDICINE

## 2021-11-11 PROCEDURE — 82550 ASSAY OF CK (CPK): CPT | Performed by: INTERNAL MEDICINE

## 2021-11-11 RX ORDER — HYDROCHLOROTHIAZIDE 12.5 MG/1
CAPSULE ORAL
Qty: 180 CAPSULE | Refills: 2 | Status: SHIPPED | OUTPATIENT
Start: 2021-11-11 | End: 2022-11-08

## 2021-11-11 ASSESSMENT — ENCOUNTER SYMPTOMS
MYALGIAS: 0
ABDOMINAL PAIN: 0
FREQUENCY: 0
NERVOUS/ANXIOUS: 0
DIZZINESS: 0
HEADACHES: 0
PARESTHESIAS: 0
FEVER: 0
WEAKNESS: 0
JOINT SWELLING: 0
SHORTNESS OF BREATH: 0
CONSTIPATION: 0
PALPITATIONS: 0
COUGH: 0
SORE THROAT: 0
EYE PAIN: 0
NAUSEA: 0
HEARTBURN: 0
CHILLS: 0
DYSURIA: 0
ARTHRALGIAS: 0
DIARRHEA: 0
HEMATOCHEZIA: 0
HEMATURIA: 0

## 2021-11-11 ASSESSMENT — MIFFLIN-ST. JEOR: SCORE: 1822.67

## 2021-11-11 ASSESSMENT — ACTIVITIES OF DAILY LIVING (ADL): CURRENT_FUNCTION: NO ASSISTANCE NEEDED

## 2021-11-11 NOTE — PATIENT INSTRUCTIONS
Mr. Yoder;  Overall you are doing pretty good.  Your loss of range of motion of the ankles almost certainly has to do with some arthritis possibly related to diabetes, as well has edema related to your weight and varicose veins.  If you lost 30 to 40 pounds the ankle range of motion would improve dramatically.  Your lower extremity edema would improve as well.    I would like to obtain some lab work today that we will look at kidney function liver function and muscle health.  Given your history of B12 deficiency we will also assess vitamin levels today.    We will see you again next year.    Best regards  Te

## 2021-11-11 NOTE — PROGRESS NOTES
"SUBJECTIVE:   Te Yoder is a 71 year old male who presents for Preventive Visit.    Patient presents for evaluation.  Is a past medical history of atrial fibrillation, hyperlipidemia and type 2 diabetes.    He denies any new symptomatology currently.  Continues to work on diet and exercise.  Cognizant of the importance of this regarding his blood pressure and diabetes and kidney function.      Patient has been advised of split billing requirements and indicates understanding: Yes  Sort of   Are you in the first 12 months of your Medicare coverage?  No    Healthy Habits:     In general, how would you rate your overall health?  Fair    Frequency of exercise:  1 day/week    Duration of exercise:  15-30 minutes    Do you usually eat at least 4 servings of fruit and vegetables a day, include whole grains    & fiber and avoid regularly eating high fat or \"junk\" foods?  No    Taking medications regularly:  Yes    Medication side effects:  None    Ability to successfully perform activities of daily living:  No assistance needed    Home Safety:  No safety concerns identified    Hearing Impairment:  No hearing concerns    In the past 6 months, have you been bothered by leaking of urine? Yes    In general, how would you rate your overall mental or emotional health?  Excellent      PHQ-2 Total Score: 1    Additional concerns today:  No    Do you feel safe in your environment? Yes    Have you ever done Advance Care Planning? (For example, a Health Directive, POLST, or a discussion with a medical provider or your loved ones about your wishes):        Fall risk  Fallen 2 or more times in the past year?: No  Any fall with injury in the past year?: No    Cognitive Screening   1) Repeat 3 items (Leader, Season, Table)    2) Clock draw: NORMAL  3) 3 item recall: Recalls 3 objects  Results: 3 items recalled: COGNITIVE IMPAIRMENT LESS LIKELY    Mini-CogTM Copyright S Francie. Licensed by the author for use in [facility]; " reprinted with permission (soob@.[email or web address]). All rights reserved.      Do you have sleep apnea, excessive snoring or daytime drowsiness?: yes    Reviewed and updated as needed this visit by clinical staff                Reviewed and updated as needed this visit by Provider               Social History     Tobacco Use     Smoking status: Never Smoker     Smokeless tobacco: Never Used   Substance Use Topics     Alcohol use: Yes     Alcohol/week: 0.0 standard drinks     Comment: 4 drinks per week     If you drink alcohol do you typically have >3 drinks per day or >7 drinks per week? No    Alcohol Use 12/29/2020   Prescreen: >3 drinks/day or >7 drinks/week? No               Current providers sharing in care for this patient include:   Patient Care Team:  Te Tam MD as PCP - General (Internal Medicine)  Ramakrishna Lombardi (Ophthalmology)  Tosin Gillette, PharmD as Pharmacist (Pharmacist)  Marques Beach MD as Assigned Surgical Provider  Te Tam MD as Assigned PCP  Moncho Chairez MD as MD (Endocrinology, Diabetes, and Metabolism)    The following health maintenance items are reviewed in Epic and correct as of today:  Health Maintenance Due   Topic Date Due     ANNUAL REVIEW OF HM ORDERS  Never done     AORTIC ANEURYSM SCREENING (SYSTEM ASSIGNED)  Never done     ADVANCE CARE PLANNING  07/16/2017     DIABETIC FOOT EXAM  07/06/2021     COVID-19 Vaccine (3 - Booster for Moderna series) 08/07/2021     INFLUENZA VACCINE (1) 09/01/2021     Lab work is in process          Review of Systems   Constitutional: Negative for chills and fever.   HENT: Negative for congestion, ear pain, hearing loss and sore throat.    Eyes: Negative for pain and visual disturbance.   Respiratory: Negative for cough and shortness of breath.    Cardiovascular: Negative for chest pain, palpitations and peripheral edema.   Gastrointestinal: Negative for abdominal pain, constipation, diarrhea, heartburn, hematochezia and nausea.  "  Genitourinary: Positive for impotence. Negative for dysuria, frequency, genital sores, hematuria, penile discharge and urgency.   Musculoskeletal: Negative for arthralgias, joint swelling and myalgias.   Skin: Negative for rash.   Neurological: Negative for dizziness, weakness, headaches and paresthesias.   Psychiatric/Behavioral: Negative for mood changes. The patient is not nervous/anxious.      Constitutional, HEENT, cardiovascular, pulmonary, gi and gu systems are negative, except as otherwise noted.    OBJECTIVE:   There were no vitals taken for this visit. Estimated body mass index is 37.01 kg/m  as calculated from the following:    Height as of 6/1/21: 1.74 m (5' 8.5\").    Weight as of 8/18/21: 112 kg (247 lb).  Physical Exam  GENERAL: healthy, alert and no distress  EYES: Eyes grossly normal to inspection, PERRL and conjunctivae and sclerae normal  HENT: ear canals and TM's normal, nose and mouth without ulcers or lesions  NECK: no adenopathy, no asymmetry, masses, or scars and thyroid normal to palpation  RESP: lungs clear to auscultation - no rales, rhonchi or wheezes  CV: regular rate and rhythm, normal S1 S2, no S3 or S4, no murmur, click or rub, no peripheral edema and peripheral pulses strong  ABDOMEN: soft, nontender, no hepatosplenomegaly, no masses and bowel sounds normal  MS: no gross musculoskeletal defects noted, no edema  SKIN: no suspicious lesions or rashes  NEURO: Normal strength and tone, mentation intact and speech normal  PSYCH: mentation appears normal, affect normal/bright    Diagnostic Test Results:  Labs reviewed in Epic    ASSESSMENT / PLAN:       ICD-10-CM    1. Encounter for preventive care  Z00.00 Cholesterol     CK total     Comprehensive metabolic panel (BMP + Alb, Alk Phos, ALT, AST, Total. Bili, TP)     CBC with platelets and differential     Vitamin B12     Folate     PSA, screen     Cholesterol     CK total     Comprehensive metabolic panel (BMP + Alb, Alk Phos, ALT, AST, " "Total. Bili, TP)     CBC with platelets and differential     Vitamin B12     Folate     PSA, screen     Hemoglobin A1c     Hemoglobin A1c   2. Essential hypertension  I10 hydrochlorothiazide (MICROZIDE) 12.5 MG capsule   3. Encounter for screening for malignant neoplasm of prostate   Z12.5 PSA, screen     PSA, screen       Patient has been advised of split billing requirements and indicates understanding: Yes  COUNSELING:  Reviewed preventive health counseling, as reflected in patient instructions    Estimated body mass index is 37.01 kg/m  as calculated from the following:    Height as of 6/1/21: 1.74 m (5' 8.5\").    Weight as of 8/18/21: 112 kg (247 lb).    Patient working on diet and exercise.  Type 2 diabetes knows importance of this.    He reports that he has never smoked. He has never used smokeless tobacco.      Appropriate preventive services were discussed with this patient, including applicable screening as appropriate for cardiovascular disease, diabetes, osteopenia/osteoporosis, and glaucoma.  As appropriate for age/gender, discussed screening for colorectal cancer, prostate cancer, breast cancer, and cervical cancer. Checklist reviewing preventive services available has been given to the patient.    Reviewed patients plan of care and provided an AVS. The Basic Care Plan (routine screening as documented in Health Maintenance) for Te meets the Care Plan requirement. This Care Plan has been established and reviewed with the Patient.    Counseling Resources:  ATP IV Guidelines  Pooled Cohorts Equation Calculator  Breast Cancer Risk Calculator  Breast Cancer: Medication to Reduce Risk  FRAX Risk Assessment  ICSI Preventive Guidelines  Dietary Guidelines for Americans, 2010  [employer]'s MyPlate  ASA Prophylaxis  Lung CA Screening    Te Tam MD  [facility]    Identified Health Risks:  "

## 2021-11-12 LAB
ALBUMIN SERPL-MCNC: 3.9 G/DL (ref 3.4–5)
ALP SERPL-CCNC: 74 U/L (ref 40–150)
ALT SERPL W P-5'-P-CCNC: 23 U/L (ref 0–70)
ANION GAP SERPL CALCULATED.3IONS-SCNC: 7 MMOL/L (ref 3–14)
AST SERPL W P-5'-P-CCNC: 12 U/L (ref 0–45)
BILIRUB SERPL-MCNC: 0.6 MG/DL (ref 0.2–1.3)
BUN SERPL-MCNC: 19 MG/DL (ref 7–30)
CALCIUM SERPL-MCNC: 9.2 MG/DL (ref 8.5–10.1)
CHLORIDE BLD-SCNC: 104 MMOL/L (ref 94–109)
CHOLEST SERPL-MCNC: 147 MG/DL
CK SERPL-CCNC: 205 U/L (ref 30–300)
CO2 SERPL-SCNC: 26 MMOL/L (ref 20–32)
CREAT SERPL-MCNC: 1.02 MG/DL (ref 0.66–1.25)
GFR SERPL CREATININE-BSD FRML MDRD: 74 ML/MIN/1.73M2
GLUCOSE BLD-MCNC: 231 MG/DL (ref 70–99)
POTASSIUM BLD-SCNC: 4 MMOL/L (ref 3.4–5.3)
PROT SERPL-MCNC: 7.7 G/DL (ref 6.8–8.8)
PSA SERPL-MCNC: 1.24 UG/L (ref 0–4)
SODIUM SERPL-SCNC: 137 MMOL/L (ref 133–144)

## 2021-11-13 LAB — HBA1C MFR BLD: 8.7 % (ref 0–5.6)

## 2021-11-15 NOTE — PROGRESS NOTES
Medication Therapy Management (MTM) Encounter    ASSESSMENT:                            Medication Adherence/Access: No issues identified    Type 2 Diabetes: Patient is not meeting A1c goal of < 8%. Self monitoring of blood glucose is not at goal of fasting  mg/dL and post prandial < 180 mg/dL. May benefit from Increasing dose of Ozempic to 1 mg, decrease VGo down to 20.    Hypertension/A. Fib: Stable. Patient is meeting blood pressure goal of < 130/80mmHg.    PLAN:                            1.  Talk with Dr. Chairez and his team about increasing Ozempic to the 1mg dose.  We may be able to reduce VGo to the 20.    Follow-up: 3 months    SUBJECTIVE/OBJECTIVE:                          Te Yoder is a 71 year old male coming in for a follow-up visit. He was referred to me from Dr. Brown, has since established care with Dr. Tam.  Today's visit is a follow-up MTM visit from 8/18/21.     Reason for visit: Routine follow-up    Tobacco: He reports that he has never smoked. He has never used smokeless tobacco.  Alcohol: 1-3 beverages/week    Medication Adherence/Access: no issues reported    Type 2 Diabetes:  Patient continues working closely with endocrinology - is now seeing Dr. Chairez.  He's currently taking metformin 1000mg twice daily, Farxiga 10mg daily, Ozempic 0.5 mg weekly and Humalog via VGo 30. Patient is not experiencing side effects.    Blood sugar monitoring: Continuous Glucose Monitor.         Symptoms of low blood sugar? None, but he does have hypoglycemia, usually from over-clicking in the evening.  He was afraid he'd run out of basal insulin so has been changing VGo at that time.  Symptoms of high blood sugar? none  Eye exam: up to date  Foot exam: up to date  Diet/Exercise: He's been trying to get to the gym more so he can pass his fitness test for work next year.  He's currently off season.  Aspirin: Not taking due to age  Statin: Yes: rosuvastatin 20 mg once daily  ACEi/ARB: Yes: lisinopril 20 mg  twice daily.   Urine Albumin:   Lab Results   Component Value Date    UMALCR 117.68 (H) 12/29/2020      Lab Results   Component Value Date    A1C 8.7 11/11/2021    A1C 8.6 06/01/2021    A1C 9.2 04/15/2021    A1C 8.4 12/29/2020    A1C 7.3 07/06/2020    A1C 7.5 06/03/2019     Hypertension/A. Fib: Current medications include Xarelto 20mg daily, hydrochlorothiazide 25mg daily, lisinopril 20mg twice daily and metoprolol XL 50mg daily.  Patient does not self-monitor blood pressure.  Patient reports no current medication side effects.  BP Readings from Last 3 Encounters:   11/17/21 109/69   11/11/21 138/86   08/18/21 99/58     Today's Vitals: /69   Pulse 100   Wt 242 lb 12.8 oz (110.1 kg)   BMI 36.92 kg/m    ----------------    I spent 50 minutes with this patient today. All changes were made via collaborative practice agreement with Te Tam MD. A copy of the visit note was provided to the patient's primary care provider.    The patient was given a summary of these recommendations.     Lainey YañezD, [facility]  Medication Therapy Management Provider  Pager: [phone]     Lainey ContrerasD  Medication Therapy Management Resident  Pager: [phone]     Medication Therapy Recommendations  Diabetes mellitus due to underlying condition with diabetic nephropathy, without long-term current use of insulin (H)    Current Medication: Semaglutide,0.25 or 0.5MG/DOS, (OZEMPIC, 0.25 OR 0.5 MG/DOSE,) 2 MG/1.5ML SOPN   Rationale: Dose too low - Dosage too low - Effectiveness   Recommendation: Increase Dose - Ozempic (1 MG/DOSE) 2 MG/1.5ML Sopn   Status: Contact Provider - Awaiting Response

## 2021-11-17 ENCOUNTER — OFFICE VISIT (OUTPATIENT)
Dept: PHARMACY | Facility: CLINIC | Age: 71
End: 2021-11-17
Payer: COMMERCIAL

## 2021-11-17 VITALS
BODY MASS INDEX: 36.92 KG/M2 | SYSTOLIC BLOOD PRESSURE: 109 MMHG | DIASTOLIC BLOOD PRESSURE: 69 MMHG | WEIGHT: 242.8 LBS | HEART RATE: 100 BPM

## 2021-11-17 DIAGNOSIS — I48.91 ATRIAL FIBRILLATION, UNSPECIFIED TYPE (H): ICD-10-CM

## 2021-11-17 DIAGNOSIS — Z79.4 TYPE 2 DIABETES MELLITUS WITH DIABETIC NEPHROPATHY, WITH LONG-TERM CURRENT USE OF INSULIN (H): Primary | ICD-10-CM

## 2021-11-17 DIAGNOSIS — E11.21 TYPE 2 DIABETES MELLITUS WITH DIABETIC NEPHROPATHY, WITH LONG-TERM CURRENT USE OF INSULIN (H): Primary | ICD-10-CM

## 2021-11-17 DIAGNOSIS — I10 ESSENTIAL HYPERTENSION: ICD-10-CM

## 2021-11-17 PROCEDURE — 99207 PR NO CHARGE LOS: CPT | Performed by: PHARMACIST

## 2021-11-17 NOTE — PATIENT INSTRUCTIONS
Recommendations from today's MTM visit:                                                    MTM (medication therapy management) is a service provided by a clinical pharmacist designed to help you get the most of out of your medicines.   Today we reviewed what your medicines are for, how to know if they are working, that your medicines are safe and how to make your medicine regimen as easy as possible.      1.  Talk with Dr. Chairez and his team about increasing Ozempic to the 1mg dose.  We may be able to reduce VGo to the 20.    2.  If you have a sore/wound - use Vaseline or Aquaphor to keep it moist and promote healing.    Follow-up: Return in about 3 months (around 2/17/2022).    It was great to speak with you today.  I value your experience and would be very thankful for your time with providing feedback on our clinic survey. You may receive a survey via email or text message in the next few days.     To schedule another MTM appointment, please call the clinic directly or you may call the MTM scheduling line at [phone] or toll-free at [phone].     My Clinical Pharmacist's contact information:                                                      Please feel free to contact me with any questions or concerns you have.      Tosin Gillette PharmD, [facility]  Medication Therapy Management Provider  Pager: [phone]     Mary Hudson PharmD  Medication Therapy Management Resident  Pager: [phone]

## 2021-12-09 ENCOUNTER — TRANSFERRED RECORDS (OUTPATIENT)
Dept: HEALTH INFORMATION MANAGEMENT | Facility: CLINIC | Age: 71
End: 2021-12-09
Payer: COMMERCIAL

## 2021-12-09 LAB — RETINOPATHY: POSITIVE

## 2022-01-17 ENCOUNTER — OFFICE VISIT (OUTPATIENT)
Dept: UROLOGY | Facility: CLINIC | Age: 72
End: 2022-01-17
Payer: COMMERCIAL

## 2022-01-17 VITALS
SYSTOLIC BLOOD PRESSURE: 122 MMHG | HEIGHT: 68 IN | BODY MASS INDEX: 38.65 KG/M2 | OXYGEN SATURATION: 99 % | DIASTOLIC BLOOD PRESSURE: 78 MMHG | HEART RATE: 62 BPM | WEIGHT: 255 LBS

## 2022-01-17 DIAGNOSIS — N40.1 BENIGN PROSTATIC HYPERPLASIA WITH LOWER URINARY TRACT SYMPTOMS, SYMPTOM DETAILS UNSPECIFIED: Primary | ICD-10-CM

## 2022-01-17 DIAGNOSIS — N40.1 BENIGN PROSTATIC HYPERPLASIA WITH LOWER URINARY TRACT SYMPTOMS, SYMPTOM DETAILS UNSPECIFIED: ICD-10-CM

## 2022-01-17 DIAGNOSIS — N20.0 NEPHROLITHIASIS: Primary | ICD-10-CM

## 2022-01-17 LAB
ALBUMIN UR-MCNC: NEGATIVE MG/DL
APPEARANCE UR: CLEAR
BILIRUB UR QL STRIP: NEGATIVE
COLOR UR AUTO: YELLOW
GLUCOSE UR STRIP-MCNC: >=1000 MG/DL
HGB UR QL STRIP: ABNORMAL
KETONES UR STRIP-MCNC: NEGATIVE MG/DL
LEUKOCYTE ESTERASE UR QL STRIP: NEGATIVE
NITRATE UR QL: NEGATIVE
PH UR STRIP: 6 [PH] (ref 5–7)
RESIDUAL VOLUME (RV) (EXTERNAL): NORMAL
SP GR UR STRIP: 1.02 (ref 1–1.03)
UROBILINOGEN UR STRIP-ACNC: 1 E.U./DL

## 2022-01-17 PROCEDURE — 99213 OFFICE O/P EST LOW 20 MIN: CPT | Mod: 25 | Performed by: UROLOGY

## 2022-01-17 PROCEDURE — 81003 URINALYSIS AUTO W/O SCOPE: CPT | Mod: QW | Performed by: UROLOGY

## 2022-01-17 PROCEDURE — 51798 US URINE CAPACITY MEASURE: CPT | Performed by: UROLOGY

## 2022-01-17 ASSESSMENT — PAIN SCALES - GENERAL: PAINLEVEL: NO PAIN (0)

## 2022-01-17 ASSESSMENT — MIFFLIN-ST. JEOR: SCORE: 1886.17

## 2022-01-17 NOTE — LETTER
"1/17/2022       RE: Te Yoder  [address]  [facility] [facility] [address]     Dear Colleague,    Thank you for referring your patient, Te Yoder, to the [facility] at [facility]. Please see a copy of my visit note below.    Assessment & Plan   ASSESSMENT and PLAN  71 year old male here for reevaluation of PSA, BPH symptoms and kidney stones.    1. Benign prostatic hyperplasia with lower urinary tract symptoms, symptom details unspecified    No symptoms and low PVR.    JACQUELINE and PSA reassuring for no prostate cancer    Recheck in 1 yr    - UA without Microscopic [[id number]]  - MEASURE POST-VOID RESIDUAL URINE/BLADDER CAPACITY, US NON-IMAGING (14570)  - PSA tumor marker [[id number]]; Future    2. Nephrolithiasis  No stones recently    KUB next year, if normal, will stop checking  - XR KUB; Future        Time spent: 20 minutes spent on the date of the encounter doing chart review, history and exam, documentation and further activities as noted above.    Osiel Taylor MD   Urology  [facility]         Subjective     CHIEF COMPLAINT   It was my pleasure to see Te Yoder  who is a 71 year old male for follow-up of PSA screening, stone follow-up and erectile dysfunction.      HPI   Te Yoder is a very pleasant 71 year old male who presents with a history of PSA screening, stone follow-up, and erectile dysfunction he is previously seen by Dr. Beach.    Since last visit, no changes in urination.  Has not experienced gross hematuria, stone passage or flank pain.    Regarding his kidney stones, he has had negative KUBs for the last 3 years.    Pills not effective for ED.    Labs: PSA November 11, 2021 was 1.24.       Objective     PHYSICAL EXAM  Patient is a 71 year old  male   Vitals: Blood pressure 122/78, pulse 62, height 1.727 m (5' 8\"), weight 115.7 kg (255 lb), SpO2 99 %.  Body mass index is 38.77 kg/m .  General: No " acute distress  : Normal rectal tone, prostate approximately 40 g without any palpable nodules

## 2022-01-17 NOTE — NURSING NOTE
Chief Complaint   Patient presents with     Benign Prostatic Hypertrophy     with lower uriary tract symptoms. Rectal exan today     Kidney stone follow up   Annual follow up. PVR was 0 today.  Norma Breaux LPN

## 2022-01-17 NOTE — PROGRESS NOTES
"Assessment & Plan   ASSESSMENT and PLAN  71 year old male here for reevaluation of PSA, BPH symptoms and kidney stones.    1. Benign prostatic hyperplasia with lower urinary tract symptoms, symptom details unspecified    No symptoms and low PVR.    JACQUELINE and PSA reassuring for no prostate cancer    Recheck in 1 yr    - UA without Microscopic [[id number]]  - MEASURE POST-VOID RESIDUAL URINE/BLADDER CAPACITY, US NON-IMAGING (48693)  - PSA tumor marker [[id number]]; Future    2. Nephrolithiasis  No stones recently    KUB next year, if normal, will stop checking  - XR KUB; Future        Time spent: 20 minutes spent on the date of the encounter doing chart review, history and exam, documentation and further activities as noted above.    Osiel Taylor MD   Urology  [facility]         Subjective     CHIEF COMPLAINT   It was my pleasure to see Te Yoder  who is a 71 year old male for follow-up of PSA screening, stone follow-up and erectile dysfunction.      HPI   Te Yoder is a very pleasant 71 year old male who presents with a history of PSA screening, stone follow-up, and erectile dysfunction he is previously seen by Dr. Beach.    Since last visit, no changes in urination.  Has not experienced gross hematuria, stone passage or flank pain.    Regarding his kidney stones, he has had negative KUBs for the last 3 years.    Pills not effective for ED.    Labs: PSA November 11, 2021 was 1.24.       Objective     PHYSICAL EXAM  Patient is a 71 year old  male   Vitals: Blood pressure 122/78, pulse 62, height 1.727 m (5' 8\"), weight 115.7 kg (255 lb), SpO2 99 %.  Body mass index is 38.77 kg/m .  General: No acute distress  : Normal rectal tone, prostate approximately 40 g without any palpable nodules               "

## 2022-02-10 ENCOUNTER — TRANSFERRED RECORDS (OUTPATIENT)
Dept: HEALTH INFORMATION MANAGEMENT | Facility: CLINIC | Age: 72
End: 2022-02-10
Payer: COMMERCIAL

## 2022-02-10 LAB
ALT SERPL-CCNC: 15 LU/L (ref 0–44)
AST SERPL-CCNC: 15 LU/L (ref 0–40)
CREATININE (EXTERNAL): 1.01 MG/DL (ref 0.76–1.27)
GFR ESTIMATED (EXTERNAL): 74 ML/MIN/1.7
GFR ESTIMATED (IF AFRICAN AMERICAN) (EXTERNAL): 86 ML/MIN/1.7
GLUCOSE (EXTERNAL): 153 MG/DL (ref 65–99)
HBA1C MFR BLD: 9.1 % (ref 4–6)
POTASSIUM (EXTERNAL): 4.1 MMOL/L (ref 3.5–5.2)

## 2022-02-21 DIAGNOSIS — I48.91 ATRIAL FIBRILLATION, UNSPECIFIED TYPE (H): ICD-10-CM

## 2022-02-22 RX ORDER — RIVAROXABAN 20 MG/1
TABLET, FILM COATED ORAL
Qty: 90 TABLET | Refills: 0 | Status: SHIPPED | OUTPATIENT
Start: 2022-02-22 | End: 2022-05-06

## 2022-02-22 NOTE — TELEPHONE ENCOUNTER
Routing refill request to provider for review/approval because:  Labs not current:    Creatinine   Date Value Ref Range Status   11/11/2021 1.02 0.66 - 1.25 mg/dL Final   06/01/2021 1.19 0.66 - 1.25 mg/dL Final    No creatinine clearance on file.  Netta Crowder RN

## 2022-03-01 ENCOUNTER — TRANSFERRED RECORDS (OUTPATIENT)
Dept: HEALTH INFORMATION MANAGEMENT | Facility: CLINIC | Age: 72
End: 2022-03-01
Payer: COMMERCIAL

## 2022-03-20 DIAGNOSIS — I48.19 PERSISTENT ATRIAL FIBRILLATION (H): ICD-10-CM

## 2022-03-21 ENCOUNTER — OFFICE VISIT (OUTPATIENT)
Dept: PHARMACY | Facility: CLINIC | Age: 72
End: 2022-03-21
Payer: COMMERCIAL

## 2022-03-21 VITALS
BODY MASS INDEX: 38.92 KG/M2 | SYSTOLIC BLOOD PRESSURE: 112 MMHG | HEART RATE: 72 BPM | WEIGHT: 256 LBS | DIASTOLIC BLOOD PRESSURE: 67 MMHG

## 2022-03-21 DIAGNOSIS — E78.5 HYPERLIPIDEMIA LDL GOAL <100: ICD-10-CM

## 2022-03-21 DIAGNOSIS — I10 ESSENTIAL HYPERTENSION: ICD-10-CM

## 2022-03-21 DIAGNOSIS — E11.21 TYPE 2 DIABETES MELLITUS WITH DIABETIC NEPHROPATHY, WITH LONG-TERM CURRENT USE OF INSULIN (H): Primary | ICD-10-CM

## 2022-03-21 DIAGNOSIS — Z79.4 TYPE 2 DIABETES MELLITUS WITH DIABETIC NEPHROPATHY, WITH LONG-TERM CURRENT USE OF INSULIN (H): Primary | ICD-10-CM

## 2022-03-21 DIAGNOSIS — I48.91 ATRIAL FIBRILLATION, UNSPECIFIED TYPE (H): ICD-10-CM

## 2022-03-21 DIAGNOSIS — E63.9 NUTRITIONAL DISORDER: ICD-10-CM

## 2022-03-21 DIAGNOSIS — R52 INTERMITTENT PAIN: ICD-10-CM

## 2022-03-21 PROCEDURE — 99607 MTMS BY PHARM ADDL 15 MIN: CPT | Performed by: PHARMACIST

## 2022-03-21 PROCEDURE — 99605 MTMS BY PHARM NP 15 MIN: CPT | Performed by: PHARMACIST

## 2022-03-21 RX ORDER — GLIMEPIRIDE 4 MG/1
8 TABLET ORAL DAILY
COMMUNITY

## 2022-03-21 NOTE — PROGRESS NOTES
Medication Therapy Management (MTM) Encounter    ASSESSMENT:                            Medication Adherence/Access: No issues identified    Type 2 Diabetes: Patient is not meeting A1c goal of < 8%. Self monitoring of blood glucose is not at goal of fasting  mg/dL and post prandial < 180 mg/dL. Radha report shows patient is not meeting goal of 70% time in range ( mg/dL), currently at 41%. Ozempic was recently increased to 1mg weekly. Patient may benefit from increasing dose of glimepiride from 4mg to 6mg daily to decrease BG and A1C. Patient reports issues with diet, would benefit from better adherence to dietary recommendations for DM.    Hypertension/A. Fib: Stable. Patient is meeting blood pressure goal of < 130/80mmHg.    Hyperlipidemia: Stable.     Pain:  Stable.    Supplements:  Stable.    PLAN:                            1.  Encouraged Te to touch base with endocrinology about increasing glimepiride to 6mg daily.    Follow-up: Return in about 3 months (around 6/21/2022) for Follow up.    SUBJECTIVE/OBJECTIVE:                          Te Yoder is a 71 year old male coming in for a follow-up visit.  Today's visit is a follow-up MTM visit from 11/17/2021, serving as an initial visit for 2022.     Reason for visit: Routine follow-up.    Tobacco: He reports that he has never smoked. He has never used smokeless tobacco.  Alcohol: 1-3 beverages / week    Medication Adherence/Access: no issues reported    Type 2 Diabetes:  Patient continues working closely with endocrinology - is now seeing Dr. Chairez and their CDE.  He's currently taking metformin 1000mg twice daily, glimepiride 4mg daily, Farxiga 10mg daily, Ozempic 1 mg weekly, and Lantus 32-34 units daily (was taking 32 units daily but recently has been taking 34 units due to elevated blood sugars). Patient is not experiencing side effects.    Blood sugar monitoring: Continuous Glucose Monitor.       Symptoms of low blood sugar? None, Radha View  "shows BG < 70 mg/dL 0% of the time.   Symptoms of high blood sugar? None. Radha View shows Very High 18% of the time and High 41% of the time.   Eye exam: up to date  Foot exam: up to date  Diet/Exercise: He's been trying to get to the gym more so he can pass his fitness test for work next year.  Patient reported diet has been \"not very good.\"  Aspirin: Not taking due to age and Xarelto use  Statin: Yes: rosuvastatin 20 mg once daily   ACEi/ARB: Yes: lisinopril 20 mg twice daily.   Urine Albumin:   Lab Results   Component Value Date    UMALCR 117.68 (H) 12/29/2020      Lab Results   Component Value Date    A1C 8.7 11/11/2021    A1C 8.6 06/01/2021    A1C 9.2 04/15/2021    A1C 8.4 12/29/2020    A1C 7.3 07/06/2020    A1C 7.5 06/03/2019     Hypertension/A. Fib: Current medications include Xarelto 20mg daily, hydrochlorothiazide 25mg daily, lisinopril 20mg twice daily and metoprolol XL 50mg daily.  Patient does not self-monitor blood pressure.  Patient reports no current medication side effects.  BP Readings from Last 3 Encounters:   03/21/22 112/67   01/17/22 122/78   11/17/21 109/69      Hyperlipidemia: Current therapy includes rosuvastatin 20mg. Patient denies muscle pains or other side effects.  The 10-year ASCVD risk score ([place] SANTAMARIA Jr., et al., 2013) is: 32.3%    Values used to calculate the score:      Age: 71 years      Sex: Male      Is Non- : No      Diabetic: Yes      Tobacco smoker: No      Systolic Blood Pressure: 112 mmHg      Is BP treated: Yes      HDL Cholesterol: 34 mg/dL      Total Cholesterol: 147 mg/dL     Recent Labs   Lab Test 11/11/21  1423 12/29/20  1020 07/06/20  1053 06/10/15  0000 01/30/15  0808   CHOL 147 146 117   < > 123   HDL  --  34* 31*   < > 32*   LDL  --  89 65   < > 68   TRIG  --  114 107   < > 113   CHOLHDLRATIO  --   --   --   --  3.8    < > = values in this interval not displayed.       Pain:  Te has not been taking acetaminophen recently, but has it " available if needed.  He generally only takes this in the summer when he's more active at work.    Supplements: Current supplements include Vitamin D and vitamin B-12 2500mcg, both now 3x/week.  He denies side effects.   Lab Results   Component Value Date    B12 760 11/11/2021      Vitamin D Deficiency Screening Results:  Lab Results   Component Value Date    VITDT 136 (H) 12/29/2020    VITDT 69 12/28/2017    VITDT 67 12/12/2013      Today's Vitals: /67   Pulse 72   Wt 256 lb (116.1 kg)   BMI 38.92 kg/m    ----------------    I spent 30 minutes with this patient today I offer these suggestions for consideration by patient's PCP and endocrinologist. A copy of the visit note was provided to the patient's provider(s).    The patient was given a summary of these recommendations.     Tosin Gillette, PharmD, [facility]  Medication Therapy Management Provider  Pager: [phone]      Medication Therapy Recommendations  Diabetes mellitus due to underlying condition with diabetic nephropathy, without long-term current use of insulin (H)        Current Medication: glimepiride (AMARYL) 4 MG tablet   Rationale: Dose too low - Dosage too low - Effectiveness   Recommendation: Increase Dose   Status: Contact Provider - Awaiting Response

## 2022-03-21 NOTE — PATIENT INSTRUCTIONS
Recommendations from today's MTM visit:                                                    MTM (medication therapy management) is a service provided by a clinical pharmacist designed to help you get the most of out of your medicines.   Today we reviewed what your medicines are for, how to know if they are working, that your medicines are safe and how to make your medicine regimen as easy as possible.    Talk with endocrinology about increasing the dose of Glimepiride to 6mg daily.    Follow-up: Return in about 3 months (around 6/21/2022) for Follow up.    It was great to speak with you today.  I value your experience and would be very thankful for your time with providing feedback on our clinic survey. You may receive a survey via email or text message in the next few days.     To schedule another MTM appointment, please call the clinic directly or you may call the MTM scheduling line at [phone] or toll-free at [phone].     My Clinical Pharmacist's contact information:                                                      Please feel free to contact me with any questions or concerns you have.      Tosin Gillette, Elvia, [facility]  Medication Therapy Management Provider  Pager: [phone]

## 2022-03-21 NOTE — LETTER
"Recommended To-Do List      Prepared on: 3/21/2022     You can get the best results from your medications by completing the items on this \"To-Do List.\"      Bring your To-Do List when you go to your doctor. And, share it with your family or caregivers.    My To-Do List:  What we talked about: What I should do:   Your medication dosage being too low    Talk with Dr. Chairez and/or CHANDRA about increasing glimepiride dose to 6mg daily          What we talked about: What I should do:                 What we talked about: What I should do:                     "

## 2022-03-21 NOTE — LETTER
_  Medication List        Prepared on: 3/21/2022     Bring your Medication List when you go to the doctor, hospital, or   emergency room. And, share it with your family or caregivers.     Note any changes to how you take your medications.  Cross out medications when you no longer use them.    Medication How I take it Why I use it Prescriber   acetaminophen (ACETAMINOPHEN 8 HOUR) 650 MG CR tablet Take 1,300 mg by mouth every 8 hours as needed for mild pain or fever Intermittent Pain Patient Reported   Continuous Blood Gluc Sensor (FREESTYLE MASOOD 14 DAY SENSOR) MISC 1 Device every 14 days Type 2 Diabetes Dr. Chairez   dapagliflozin (FARXIGA) 10 MG TABS tablet Take 10 mg by mouth daily Type 2 Diabetes Dr. Chairez   glimepiride (AMARYL) 4 MG tablet Take 4 mg by mouth daily Type 2 Diabetes Dr. Chairez   hydrochlorothiazide (MICROZIDE) 12.5 MG capsule TAKE TWO CAPSULES BY MOUTH DAILY Essential Hypertension Te Tam MD   insulin glargine (LANTUS PEN) 100 UNIT/ML pen Inject 32-34 Units Subcutaneous daily Type 2 Diabetes Dr. Chairez   lisinopril (ZESTRIL) 20 MG tablet TAKE ONE TABLET BY MOUTH TWICE DAILY  Essential hypertension with goal blood pressure less than 140/90 Te Tam MD   metFORMIN (GLUCOPHAGE) 1000 MG tablet Take 1 tablet (1,000 mg) by mouth 2 times daily (take with meals) Type 2 diabetes mellitus without complication, with long-term current use of insulin (H) Te Tam MD   metoprolol succinate ER (TOPROL-XL) 50 MG 24 hr tablet Take 1 tablet (50 mg) by mouth daily Persistent Atrial Fibrillation (H) Te Tam MD   rosuvastatin (CRESTOR) 20 MG tablet Take 1 tablet (20 mg) by mouth daily Coronary artery calcification Te Tam MD   Semaglutide, 1 MG/DOSE, 4 MG/3ML SOPN Inject 1 mg Subcutaneous once a week Type 2 diabetes Dr. Chairez   vitamin B-12 (CYANOCOBALAMIN) 2500 MCG sublingual tablet Take 2,500 mcg by mouth three times a week  General Health  Self   Vitamin D, Cholecalciferol, 25 MCG (1000  UT) TABS Take 1 tablet by mouth three times a week  General Health  Self   XARELTO ANTICOAGULANT 20 MG TABS tablet TAKE ONE TABLET BY MOUTH ONE TIME DAILY WITH DINNER Atrial fibrillation, unspecified type (H) Te Tam MD         Add new medications, over-the-counter drugs, herbals, vitamins, or  minerals in the blank rows below.    Medication How I take it Why I use it Prescriber                          Allergies:      simvastatin        Side effects I have had:              Other Information:             My notes and questions:

## 2022-03-21 NOTE — LETTER
March 21, 2022  Te MORENO Yoder  [address] [address]    Dear  Beba, [facility]        Thank you for talking with me on Mar 21, 2022 about your health and medications. As a follow-up to our conversation, I have included two documents:      1. Your Recommended To-Do List has steps you should take to get the best results from your medications.  2. Your Medication List will help you keep track of your medications and how to take them.    If you want to talk about these documents, please call Tosin Gillette PharmD at phone: [phone], Monday-Friday 8-4:30pm.    I look forward to working with you and your doctors to make sure your medications work well for you.    Sincerely,    Tosin Gillette PharmD  [facility] Pharmacist, [facility]

## 2022-03-22 RX ORDER — METOPROLOL SUCCINATE 50 MG/1
50 TABLET, EXTENDED RELEASE ORAL DAILY
Qty: 90 TABLET | Refills: 1 | Status: SHIPPED | OUTPATIENT
Start: 2022-03-22 | End: 2022-10-03

## 2022-03-22 NOTE — TELEPHONE ENCOUNTER
"Last OV 11/11/21   Prescription approved per [facility] Refill Protocol.  Dorothy SEAY Triage RN  [facility]       Requested Prescriptions   Pending Prescriptions Disp Refills     metoprolol succinate ER (TOPROL-XL) 50 MG 24 hr tablet [Pharmacy Med Name: Metoprolol Succinate ER Oral Tablet Extended Release 24 Hour 50 MG] 90 tablet 0     Sig: Take 1 tablet (50 mg) by mouth daily       Beta-Blockers Protocol Passed - 3/22/2022  3:08 PM        Passed - Blood pressure under 140/90 in past 12 months     BP Readings from Last 3 Encounters:   03/21/22 112/67   01/17/22 122/78   11/17/21 109/69                 Passed - Patient is age 6 or older        Passed - Recent (12 mo) or future (30 days) visit within the authorizing provider's specialty     Patient has had an office visit with the authorizing provider or a provider within the authorizing providers department within the previous 12 mos or has a future within next 30 days. See \"Patient Info\" tab in inbasket, or \"Choose Columns\" in Meds & Orders section of the refill encounter.              Passed - Medication is active on med list             "

## 2022-05-04 DIAGNOSIS — I10 ESSENTIAL HYPERTENSION WITH GOAL BLOOD PRESSURE LESS THAN 140/90: ICD-10-CM

## 2022-05-04 DIAGNOSIS — E11.9 TYPE 2 DIABETES MELLITUS WITHOUT COMPLICATION, WITH LONG-TERM CURRENT USE OF INSULIN (H): ICD-10-CM

## 2022-05-04 DIAGNOSIS — I48.91 ATRIAL FIBRILLATION, UNSPECIFIED TYPE (H): ICD-10-CM

## 2022-05-04 DIAGNOSIS — Z79.4 TYPE 2 DIABETES MELLITUS WITHOUT COMPLICATION, WITH LONG-TERM CURRENT USE OF INSULIN (H): ICD-10-CM

## 2022-05-06 RX ORDER — RIVAROXABAN 20 MG/1
TABLET, FILM COATED ORAL
Qty: 90 TABLET | Refills: 0 | Status: SHIPPED | OUTPATIENT
Start: 2022-05-06 | End: 2022-09-01

## 2022-05-06 RX ORDER — LISINOPRIL 20 MG/1
TABLET ORAL
Qty: 180 TABLET | Refills: 0 | Status: SHIPPED | OUTPATIENT
Start: 2022-05-06 | End: 2022-11-08

## 2022-05-06 NOTE — TELEPHONE ENCOUNTER
Routing refill request to provider for review/approval because:   Creatinine Clearance greater than 50 ml/min on file in past 3 mos    Jose Thompson RN  [facility] Triage Nurse

## 2022-05-06 NOTE — TELEPHONE ENCOUNTER
Prescription approved per [facility] Refill Protocol.  Jose Thompson RN

## 2022-05-14 DIAGNOSIS — I25.10 CORONARY ARTERY CALCIFICATION: ICD-10-CM

## 2022-05-16 RX ORDER — ROSUVASTATIN CALCIUM 20 MG/1
TABLET, COATED ORAL
Qty: 90 TABLET | Refills: 0 | Status: SHIPPED | OUTPATIENT
Start: 2022-05-16 | End: 2022-11-08

## 2022-05-16 NOTE — TELEPHONE ENCOUNTER
Prescription approved per [facility] Refill Protocol.  Jayshree Camarena RN

## 2022-08-15 ENCOUNTER — OFFICE VISIT (OUTPATIENT)
Dept: PHARMACY | Facility: CLINIC | Age: 72
End: 2022-08-15
Payer: COMMERCIAL

## 2022-08-15 ENCOUNTER — LAB (OUTPATIENT)
Dept: LAB | Facility: CLINIC | Age: 72
End: 2022-08-15
Payer: COMMERCIAL

## 2022-08-15 VITALS
HEART RATE: 80 BPM | OXYGEN SATURATION: 98 % | WEIGHT: 231 LBS | SYSTOLIC BLOOD PRESSURE: 109 MMHG | DIASTOLIC BLOOD PRESSURE: 64 MMHG | BODY MASS INDEX: 35.12 KG/M2

## 2022-08-15 DIAGNOSIS — Z79.4 TYPE 2 DIABETES MELLITUS WITH DIABETIC NEPHROPATHY, WITH LONG-TERM CURRENT USE OF INSULIN (H): ICD-10-CM

## 2022-08-15 DIAGNOSIS — E11.21 TYPE 2 DIABETES MELLITUS WITH DIABETIC NEPHROPATHY, WITH LONG-TERM CURRENT USE OF INSULIN (H): Primary | ICD-10-CM

## 2022-08-15 DIAGNOSIS — Z79.4 TYPE 2 DIABETES MELLITUS WITH DIABETIC NEPHROPATHY, WITH LONG-TERM CURRENT USE OF INSULIN (H): Primary | ICD-10-CM

## 2022-08-15 DIAGNOSIS — I48.91 ATRIAL FIBRILLATION, UNSPECIFIED TYPE (H): ICD-10-CM

## 2022-08-15 DIAGNOSIS — I10 ESSENTIAL HYPERTENSION: ICD-10-CM

## 2022-08-15 DIAGNOSIS — E78.5 HYPERLIPIDEMIA LDL GOAL <100: ICD-10-CM

## 2022-08-15 DIAGNOSIS — R52 INTERMITTENT PAIN: ICD-10-CM

## 2022-08-15 DIAGNOSIS — E11.21 TYPE 2 DIABETES MELLITUS WITH DIABETIC NEPHROPATHY, WITH LONG-TERM CURRENT USE OF INSULIN (H): ICD-10-CM

## 2022-08-15 DIAGNOSIS — E63.9 NUTRITIONAL DISORDER: ICD-10-CM

## 2022-08-15 LAB
ANION GAP SERPL CALCULATED.3IONS-SCNC: 6 MMOL/L (ref 3–14)
BUN SERPL-MCNC: 24 MG/DL (ref 7–30)
CALCIUM SERPL-MCNC: 9.6 MG/DL (ref 8.5–10.1)
CHLORIDE BLD-SCNC: 105 MMOL/L (ref 94–109)
CHOLEST SERPL-MCNC: 118 MG/DL
CO2 SERPL-SCNC: 27 MMOL/L (ref 20–32)
CREAT SERPL-MCNC: 1.26 MG/DL (ref 0.66–1.25)
FASTING STATUS PATIENT QL REPORTED: NO
GFR SERPL CREATININE-BSD FRML MDRD: 61 ML/MIN/1.73M2
GLUCOSE BLD-MCNC: 151 MG/DL (ref 70–99)
HBA1C MFR BLD: 7.2 % (ref 0–5.6)
HDLC SERPL-MCNC: 32 MG/DL
LDLC SERPL CALC-MCNC: 67 MG/DL
NONHDLC SERPL-MCNC: 86 MG/DL
POTASSIUM BLD-SCNC: 4 MMOL/L (ref 3.4–5.3)
SODIUM SERPL-SCNC: 138 MMOL/L (ref 133–144)
TRIGL SERPL-MCNC: 96 MG/DL

## 2022-08-15 PROCEDURE — 82043 UR ALBUMIN QUANTITATIVE: CPT

## 2022-08-15 PROCEDURE — 99607 MTMS BY PHARM ADDL 15 MIN: CPT | Performed by: PHARMACIST

## 2022-08-15 PROCEDURE — 83036 HEMOGLOBIN GLYCOSYLATED A1C: CPT

## 2022-08-15 PROCEDURE — 99606 MTMS BY PHARM EST 15 MIN: CPT | Performed by: PHARMACIST

## 2022-08-15 PROCEDURE — 80048 BASIC METABOLIC PNL TOTAL CA: CPT

## 2022-08-15 PROCEDURE — 80061 LIPID PANEL: CPT

## 2022-08-15 PROCEDURE — 36415 COLL VENOUS BLD VENIPUNCTURE: CPT

## 2022-08-15 RX ORDER — INSULIN LISPRO 100 [IU]/ML
INJECTION, SOLUTION INTRAVENOUS; SUBCUTANEOUS
COMMUNITY

## 2022-08-15 RX ORDER — GLUCAGON 3 MG/1
POWDER NASAL
COMMUNITY
Start: 2022-08-09 | End: 2023-07-26

## 2022-08-15 RX ORDER — PROCHLORPERAZINE 25 MG/1
SUPPOSITORY RECTAL
COMMUNITY

## 2022-08-15 NOTE — PROGRESS NOTES
Medication Therapy Management (MTM) Encounter    ASSESSMENT:                            Medication Adherence/Access: No issues identified    Type 2 Diabetes: Te's last A1c in February was above goal of <7%. His recent Dexcom readings do show 73.6% time in target range, which is above the goal of >70% time, with additional reads higher than target range.  He is due for an A1c and microalbumin recheck and will benefit continued collaboration with endocrinology to manage his diabetes.  I do have concerns about his haphazard use of Humalog, may benefit from changing to a regimen that would be safer with less risk of hypoglycemia.    Hypertension/A. Fib: Stable - Te's blood pressure is below the goal of <130/80 mmHg and he denies dizziness or falls associated with low blood pressures.  Due for BMP.    Hyperlipidemia: Stable - He is currently taking a high intensity statin which is indicated per the 2019 ACC/AHA guidelines for lipid management and would benefit from a lipid panel to reassess hyperlipidemia.    Pain: Stable.     Supplements: Stable.    PLAN:                            1.  Talk with your endocrinology team about increasing Ozempic to the 2mg dose or possibly changing Ozempic to Mounjaro (which is a new diabetes drug).  This may allow us to use less/no Humalog insulin which would be safer in the long run.  2.  Labs today - A1c, kidney function/electrolytes, cholesterol and protein in the urine    Follow-up: Return in about 3 months (around 11/15/2022) for Follow-up Medication Review.    SUBJECTIVE/OBJECTIVE:                          Te Yoder is a 71 year old male coming in for a follow-up visit.  Today's visit is a follow-up MTM visit from 3/21/2022.     Reason for visit: Routine follow-up.    Tobacco: He reports that he has never smoked. He has never used smokeless tobacco.  Alcohol: 1-3 beverages / week    Medication Adherence/Access: no issues reported    Type 2 Diabetes:  Patient continues working  closely with endocrinology - is now seeing Dr. Chairez and their CDE (he reports he's been seeing them regularly last visit was 1-2 weeks ago).  He's currently taking metformin 1000mg twice daily, glimepiride 4mg daily (they did increase to 6mg but he had hypoglycemia so it was reduced back to 4mg), Farxiga 10mg daily, Ozempic 1 mg weekly, and Humalog based on sliding scale with meals (taking 2-3 times per day as a correction).  He was also prescribed Baqsimi by the endocrinologist to use as needed for hypoglycemia, however he has not had to use it. He reports he's off Lantus.  Patient is not experiencing side effects.    Blood sugar monitoring: Continuous Glucose Monitor - now using Dexcom G6      Symptoms of low blood sugar? None   Symptoms of high blood sugar? None.   Eye exam: up to date  Foot exam: up to date  Diet/Exercise: He's been active this summer at work, has lost about 25#  Aspirin: Not taking due to age and Xarelto use  Statin: Yes: rosuvastatin 20 mg once daily   ACEi/ARB: Yes: lisinopril 20 mg twice daily.   Urine Albumin:   Lab Results   Component Value Date    UMALCR 117.68 (H) 12/29/2020      Lab Results   Component Value Date    A1C 8.7 11/11/2021    A1C 8.6 06/01/2021    A1C 9.2 04/15/2021    A1C 8.4 12/29/2020    A1C 7.3 07/06/2020    A1C 7.5 06/03/2019     GFR Estimate   Date Value Ref Range Status   11/11/2021 74 >60 mL/min/1.73m2 Final     Comment:     As of July 11, 2021, eGFR is calculated by the CKD-EPI creatinine equation, without race adjustment. eGFR can be influenced by muscle mass, exercise, and diet. The reported eGFR is an estimation only and is only applicable if the renal function is stable.   06/01/2021 61 >60 mL/min/[1.73_m2] Final     Comment:     Non  GFR Calc  Starting 12/18/2018, serum creatinine based estimated GFR (eGFR) will be   calculated using the Chronic Kidney Disease Epidemiology Collaboration   (CKD-EPI) equation.       Wt Readings from Last 4  Encounters:   08/15/22 231 lb (104.8 kg)   03/21/22 256 lb (116.1 kg)   01/17/22 255 lb (115.7 kg)   11/17/21 242 lb 12.8 oz (110.1 kg)      Hypertension/A. Fib: Current medications include Xarelto 20mg daily, hydrochlorothiazide 25mg daily, lisinopril 20mg twice daily and metoprolol XL 50mg daily.  Patient does not self-monitor blood pressure.  Patient reports no current medication side effects. He denies signs of bruising, bleeding, or dizziness.  BP Readings from Last 3 Encounters:   08/15/22 109/64   03/21/22 112/67   01/17/22 122/78       Hyperlipidemia: Current therapy includes rosuvastatin 20mg. Patient denies muscle pains or other side effects.  The 10-year ASCVD risk score ([place] SANTAMARIA Jr., et al., 2013) is: 31.7%    Values used to calculate the score:      Age: 71 years      Sex: Male      Is Non- : No      Diabetic: Yes      Tobacco smoker: No      Systolic Blood Pressure: 109 mmHg      Is BP treated: Yes      HDL Cholesterol: 32 mg/dL      Total Cholesterol: 147 mg/dL     Recent Labs   Lab Test 11/11/21  1423 09/15/21  1331 12/29/20  1020 07/06/20  1053 06/10/15  0000 01/30/15  0808   CHOL 147  --  146 117   < > 123   HDL  --   --  34* 31*   < > 32*   LDL  --   --  89 65   < > 68   TRIG  --  291* 114 107   < > 113   CHOLHDLRATIO  --   --   --   --   --  3.8    < > = values in this interval not displayed.        Pain:  Te has not been taking acetaminophen recently, but has it available if needed for back pain.  No side effects reported when he does use it.    Supplements: Current supplements include Vitamin D and vitamin B-12 2500mcg, both now once a week at his doctor's recommendations (doses unknown).  He denies side effects.   Lab Results   Component Value Date    B12 760 11/11/2021      Vitamin D Deficiency Screening Results:  Lab Results   Component Value Date    VITDT 136 (H) 12/29/2020    VITDT 69 12/28/2017    VITDT 67 12/12/2013     Today's Vitals: /64   Pulse 80    Wt 231 lb (104.8 kg)   SpO2 98%   BMI 35.12 kg/m    ----------------    I spent 30 minutes with this patient today.  A copy of the visit note was provided to the patient's provider(s).    The patient was given a summary of these recommendations.     Susan Mendoza, PharmD IV Student    Tosin Burres, PharmD, [facility]  Medication Therapy Management Provider  Pager: [phone]      Medication Therapy Recommendations  Diabetes mellitus due to underlying condition with diabetic nephropathy, without long-term current use of insulin (H)    Current Medication: glimepiride (AMARYL) 4 MG tablet   Rationale: Dose too low - Dosage too low - Effectiveness   Recommendation: Increase Dose   Status: Accepted per Provider          Current Medication: insulin lispro (HUMALOG KWIKPEN) 100 UNIT/ML (1 unit dial) KWIKPEN   Rationale: Unsafe medication for the patient - Adverse medication event - Safety   Recommendation: Change Medication - Ozempic (2 MG/DOSE) 8 MG/3ML Sopn   Status: Contact Provider - Awaiting Response

## 2022-08-15 NOTE — PATIENT INSTRUCTIONS
"Recommendations from today's MTM visit:                                                    MTM (medication therapy management) is a service provided by a clinical pharmacist designed to help you get the most of out of your medicines.   Today we reviewed what your medicines are for, how to know if they are working, that your medicines are safe and how to make your medicine regimen as easy as possible.      1.  Talk with your endocrinology team about increasing Ozempic to the 2mg dose or possibly changing Ozempic to Mounjaro (which is a new diabetes drug).  This may allow us to use less/no Humalog insulin which would be safer in the long run.    2.  Labs today - A1c, kidney function/electrolytes, cholesterol and protein in the urine    Follow-up: Return in about 3 months (around 11/15/2022) for Follow-up Medication Review.    It was great speaking with you today.  I value your experience and would be very thankful for your time in providing feedback in our clinic survey. In the next few days, you may receive an email or text message from [employer] with a link to a survey related to your  clinical pharmacist.\"     To schedule another MTM appointment, please call the clinic directly or you may call the MTM scheduling line at [phone] or toll-free at [phone].     My Clinical Pharmacist's contact information:                                                      Please feel free to contact me with any questions or concerns you have.      Tosin Gillette, Elvia, [facility]  Medication Therapy Management Provider  Pager: [phone]    "

## 2022-08-16 DIAGNOSIS — I10 ESSENTIAL HYPERTENSION: Primary | ICD-10-CM

## 2022-08-16 LAB
CREAT UR-MCNC: 69 MG/DL
MICROALBUMIN UR-MCNC: 54 MG/L
MICROALBUMIN/CREAT UR: 78.26 MG/G CR (ref 0–17)

## 2022-08-30 DIAGNOSIS — I48.91 ATRIAL FIBRILLATION, UNSPECIFIED TYPE (H): ICD-10-CM

## 2022-09-01 RX ORDER — RIVAROXABAN 20 MG/1
TABLET, FILM COATED ORAL
Qty: 90 TABLET | Refills: 0 | Status: SHIPPED | OUTPATIENT
Start: 2022-09-01 | End: 2022-11-08

## 2022-09-01 NOTE — TELEPHONE ENCOUNTER
Routing refill request to provider for review/approval because:  Labs not current:  no creatinine clearance  Patient needs to be seen because:  over 6 months.  Netta Crowder RN

## 2022-10-01 DIAGNOSIS — I48.19 PERSISTENT ATRIAL FIBRILLATION (H): ICD-10-CM

## 2022-10-03 RX ORDER — METOPROLOL SUCCINATE 50 MG/1
50 TABLET, EXTENDED RELEASE ORAL DAILY
Qty: 90 TABLET | Refills: 0 | Status: SHIPPED | OUTPATIENT
Start: 2022-10-03 | End: 2022-11-08

## 2022-10-03 NOTE — TELEPHONE ENCOUNTER
Medication filled one time only as pt is due for a follow-up for further refills.  Pharmacy has been notified to inform patient to call clinic and schedule appointment.  Irene Ulloa RN

## 2022-10-15 ENCOUNTER — HEALTH MAINTENANCE LETTER (OUTPATIENT)
Age: 72
End: 2022-10-15

## 2022-11-08 ENCOUNTER — OFFICE VISIT (OUTPATIENT)
Dept: FAMILY MEDICINE | Facility: CLINIC | Age: 72
End: 2022-11-08
Payer: COMMERCIAL

## 2022-11-08 VITALS
HEART RATE: 78 BPM | DIASTOLIC BLOOD PRESSURE: 82 MMHG | RESPIRATION RATE: 19 BRPM | WEIGHT: 229.6 LBS | TEMPERATURE: 98.1 F | OXYGEN SATURATION: 97 % | SYSTOLIC BLOOD PRESSURE: 119 MMHG | HEIGHT: 68 IN | BODY MASS INDEX: 34.8 KG/M2

## 2022-11-08 DIAGNOSIS — E78.5 HYPERLIPIDEMIA LDL GOAL <100: ICD-10-CM

## 2022-11-08 DIAGNOSIS — E11.9 TYPE 2 DIABETES MELLITUS WITHOUT COMPLICATION, WITH LONG-TERM CURRENT USE OF INSULIN (H): Primary | ICD-10-CM

## 2022-11-08 DIAGNOSIS — I25.10 CORONARY ARTERY CALCIFICATION: ICD-10-CM

## 2022-11-08 DIAGNOSIS — N18.2 CKD (CHRONIC KIDNEY DISEASE) STAGE 2, GFR 60-89 ML/MIN: ICD-10-CM

## 2022-11-08 DIAGNOSIS — Z13.6 ENCOUNTER FOR ABDOMINAL AORTIC ANEURYSM (AAA) SCREENING: ICD-10-CM

## 2022-11-08 DIAGNOSIS — I10 PRIMARY HYPERTENSION: ICD-10-CM

## 2022-11-08 DIAGNOSIS — G62.9 PERIPHERAL POLYNEUROPATHY: ICD-10-CM

## 2022-11-08 DIAGNOSIS — E66.01 MORBID OBESITY DUE TO EXCESS CALORIES (H): ICD-10-CM

## 2022-11-08 DIAGNOSIS — I48.19 PERSISTENT ATRIAL FIBRILLATION (H): ICD-10-CM

## 2022-11-08 DIAGNOSIS — Z79.4 TYPE 2 DIABETES MELLITUS WITHOUT COMPLICATION, WITH LONG-TERM CURRENT USE OF INSULIN (H): Primary | ICD-10-CM

## 2022-11-08 DIAGNOSIS — I10 ESSENTIAL HYPERTENSION: ICD-10-CM

## 2022-11-08 DIAGNOSIS — I48.91 ATRIAL FIBRILLATION, UNSPECIFIED TYPE (H): ICD-10-CM

## 2022-11-08 DIAGNOSIS — G47.33 OSA (OBSTRUCTIVE SLEEP APNEA): ICD-10-CM

## 2022-11-08 DIAGNOSIS — I10 ESSENTIAL HYPERTENSION WITH GOAL BLOOD PRESSURE LESS THAN 140/90: ICD-10-CM

## 2022-11-08 PROCEDURE — 90471 IMMUNIZATION ADMIN: CPT | Performed by: INTERNAL MEDICINE

## 2022-11-08 PROCEDURE — 99207 PR FOOT EXAM NO CHARGE: CPT | Performed by: INTERNAL MEDICINE

## 2022-11-08 PROCEDURE — 99214 OFFICE O/P EST MOD 30 MIN: CPT | Mod: 25 | Performed by: INTERNAL MEDICINE

## 2022-11-08 PROCEDURE — 90715 TDAP VACCINE 7 YRS/> IM: CPT | Performed by: INTERNAL MEDICINE

## 2022-11-08 RX ORDER — HYDROCHLOROTHIAZIDE 12.5 MG/1
CAPSULE ORAL
Qty: 180 CAPSULE | Refills: 2 | Status: SHIPPED | OUTPATIENT
Start: 2022-11-08 | End: 2023-10-26

## 2022-11-08 RX ORDER — ROSUVASTATIN CALCIUM 20 MG/1
20 TABLET, COATED ORAL DAILY
Qty: 90 TABLET | Refills: 3 | Status: SHIPPED | OUTPATIENT
Start: 2022-11-08 | End: 2023-10-30

## 2022-11-08 RX ORDER — LISINOPRIL 20 MG/1
20 TABLET ORAL 2 TIMES DAILY
Qty: 180 TABLET | Refills: 0 | Status: SHIPPED | OUTPATIENT
Start: 2022-11-08 | End: 2023-06-22

## 2022-11-08 RX ORDER — METOPROLOL SUCCINATE 50 MG/1
50 TABLET, EXTENDED RELEASE ORAL DAILY
Qty: 90 TABLET | Refills: 3 | Status: SHIPPED | OUTPATIENT
Start: 2022-11-08 | End: 2024-01-24

## 2022-11-08 ASSESSMENT — PAIN SCALES - GENERAL: PAINLEVEL: NO PAIN (0)

## 2022-11-08 NOTE — NURSING NOTE
Prior to immunization administration, verified patients identity using patient s name and date of birth. Please see Immunization Activity for additional information.     Screening Questionnaire for Adult Immunization    Are you sick today?   No   Do you have allergies to medications, food, a vaccine component or latex?   No   Have you ever had a serious reaction after receiving a vaccination?   No   Do you have a long-term health problem with heart, lung, kidney, or metabolic disease (e.g., diabetes), asthma, a blood disorder, no spleen, complement component deficiency, a cochlear implant, or a spinal fluid leak?  Are you on long-term aspirin therapy?   Yes   Do you have cancer, leukemia, HIV/AIDS, or any other immune system problem?   No   Do you have a parent, brother, or sister with an immune system problem?   No   In the past 3 months, have you taken medications that affect  your immune system, such as prednisone, other steroids, or anticancer drugs; drugs for the treatment of rheumatoid arthritis, Crohn s disease, or psoriasis; or have you had radiation treatments?   No   Have you had a seizure, or a brain or other nervous system problem?   No   During the past year, have you received a transfusion of blood or blood    products, or been given immune (gamma) globulin or antiviral drug?   No   For women: Are you pregnant or is there a chance you could become       pregnant during the next month?   No   Have you received any vaccinations in the past 4 weeks?   Yes     Immunization questionnaire was positive for at least one answer.  Notified Dr. Akhtar.        Per orders of Dr. Akhtar, injection of Tdap given by Sheila Rider CMA. Patient instructed to remain in clinic for 15 minutes afterwards, and to report any adverse reaction to me immediately.       Screening performed by Sheila Rider CMA on 11/8/2022 at 12:01 PM.

## 2022-11-08 NOTE — PATIENT INSTRUCTIONS
Please call [facility] at [phone] to schedule your abdominal aortic aneurysm screening ultrasound.

## 2022-11-08 NOTE — PROGRESS NOTES
Assessment & Plan     Type 2 diabetes mellitus without complication, with long-term current use of insulin (H)  Improving control  Recheck in April  Continue follow up with Dr. Chairez  - metFORMIN (GLUCOPHAGE) 1000 MG tablet; Take 1 tablet (1,000 mg) by mouth 2 times daily (take with meals)  - FOOT EXAM    Atrial fibrillation, unspecified type (H)  Rate controlled; on xarelto  - rivaroxaban ANTICOAGULANT (XARELTO ANTICOAGULANT) 20 MG TABS tablet; TAKE ONE TABLET BY MOUTH ONE TIME DAILY WITH DINNER    Morbid obesity due to excess calories (H)  Counseled on diet and exercise interventions to promote weight loss     Peripheral polyneuropathy  Discussed medications to help with symptoms; he declined  Discussed importance of keeping A1c low to prevent progression also limit alcohol    CKD (chronic kidney disease) stage 2, GFR 60-89 ml/min  He had many questions about this and I did my best to answer them  He knows to avoid NSAID  He is on state of the art medication to prevent progression such as farxiga and lisinopril     Primary hypertension  Well controlled    Hyperlipidemia LDL goal <100  On statin therapy ; lipids well controlled in Augsust     ROC (obstructive sleep apnea)      Persistent atrial fibrillation (H)    - metoprolol succinate ER (TOPROL XL) 50 MG 24 hr tablet; Take 1 tablet (50 mg) by mouth daily    Coronary artery calcification    - rosuvastatin (CRESTOR) 20 MG tablet; Take 1 tablet (20 mg) by mouth daily    Essential hypertension    - hydrochlorothiazide (MICROZIDE) 12.5 MG capsule; TAKE TWO CAPSULES BY MOUTH DAILY    Essential hypertension with goal blood pressure less than 140/90    - lisinopril (ZESTRIL) 20 MG tablet; Take 1 tablet (20 mg) by mouth 2 times daily    Encounter for abdominal aortic aneurysm (AAA) screening    - [place] Aorta Medicare AAA Screening; Future      35 minutes spent on the date of the encounter doing chart review, history and exam, documentation and further activities per the  "note       BMI:   Estimated body mass index is 34.91 kg/m  as calculated from the following:    Height as of this encounter: 1.727 m (5' 8\").    Weight as of this encounter: 104.1 kg (229 lb 9.6 oz).   Weight management plan: Discussed healthy diet and exercise guidelines        Return in about 4 months (around 3/8/2023) for Preventive Visit.  Patient instructed to return to clinic or contact us sooner if symptoms worsen or new symptoms develop.     Gonzalez Akhtar MD  [facility] Tiwari is a 72 year old, presenting for the following health issues:  Establish Care      History of Present Illness       Reason for visit:  New doctor meeting    He eats 0-1 servings of fruits and vegetables daily.He consumes 0 sweetened beverage(s) daily.He exercises with enough effort to increase his heart rate 9 or less minutes per day.  He exercises with enough effort to increase his heart rate 3 or less days per week.   He is taking medications regularly.         He has improving diabetes control on ozempic  He has also lost a lot of weight  He sees Dr. Chairez  He feels well, but has a number of questions       Review of Systems         Objective    /82 (BP Location: Left arm, Patient Position: Sitting, Cuff Size: Adult Regular)   Pulse 78   Temp 98.1  F (36.7  C) (Oral)   Resp 19   Ht 1.727 m (5' 8\")   Wt 104.1 kg (229 lb 9.6 oz)   SpO2 97%   BMI 34.91 kg/m    Body mass index is 34.91 kg/m .  Physical Exam   GENERAL: healthy, alert and no distress  RESP: lungs clear to auscultation - no rales, rhonchi or wheezes  CV: The heart has an irregularly irregular rhythm with a normal rate. .   ABDOMEN: Obese, soft, nontender, no hepatosplenomegaly, no masses and bowel sounds normal  MS: no gross musculoskeletal defects noted, no edema  NEURO: Normal strength and tone, mentation intact and speech normal  PSYCH: mentation appears normal, affect normal/bright  Diabetic foot exam: normal DP and PT " pulses, no trophic changes or ulcerative lesions and reduced sensation to 10 g monofilament both feet

## 2022-11-10 ENCOUNTER — ANCILLARY PROCEDURE (OUTPATIENT)
Dept: ULTRASOUND IMAGING | Facility: CLINIC | Age: 72
End: 2022-11-10
Attending: INTERNAL MEDICINE
Payer: COMMERCIAL

## 2022-11-10 DIAGNOSIS — Z13.6 ENCOUNTER FOR ABDOMINAL AORTIC ANEURYSM (AAA) SCREENING: ICD-10-CM

## 2022-11-10 PROCEDURE — 76706 US ABDL AORTA SCREEN AAA: CPT

## 2022-11-10 NOTE — RESULT ENCOUNTER NOTE
The following letter pertains to your most recent diagnostic tests:    -Your abdominal aortic aneurysm screen ultrasound did NOT show evidence for abdominal aortic aneurysm.         Sincerely,    Dr. Akhtar

## 2022-12-06 ENCOUNTER — OFFICE VISIT (OUTPATIENT)
Dept: PHARMACY | Facility: CLINIC | Age: 72
End: 2022-12-06
Payer: COMMERCIAL

## 2022-12-06 VITALS — SYSTOLIC BLOOD PRESSURE: 124 MMHG | BODY MASS INDEX: 34.97 KG/M2 | DIASTOLIC BLOOD PRESSURE: 64 MMHG | WEIGHT: 230 LBS

## 2022-12-06 DIAGNOSIS — E63.9 NUTRITIONAL DISORDER: ICD-10-CM

## 2022-12-06 DIAGNOSIS — I48.91 ATRIAL FIBRILLATION, UNSPECIFIED TYPE (H): ICD-10-CM

## 2022-12-06 DIAGNOSIS — E78.5 HYPERLIPIDEMIA LDL GOAL <100: ICD-10-CM

## 2022-12-06 DIAGNOSIS — I10 ESSENTIAL HYPERTENSION: ICD-10-CM

## 2022-12-06 DIAGNOSIS — E11.21 TYPE 2 DIABETES MELLITUS WITH DIABETIC NEPHROPATHY, WITH LONG-TERM CURRENT USE OF INSULIN (H): Primary | ICD-10-CM

## 2022-12-06 DIAGNOSIS — R52 INTERMITTENT PAIN: ICD-10-CM

## 2022-12-06 DIAGNOSIS — Z79.4 TYPE 2 DIABETES MELLITUS WITH DIABETIC NEPHROPATHY, WITH LONG-TERM CURRENT USE OF INSULIN (H): Primary | ICD-10-CM

## 2022-12-06 PROCEDURE — 99606 MTMS BY PHARM EST 15 MIN: CPT | Performed by: PHARMACIST

## 2022-12-06 PROCEDURE — 99607 MTMS BY PHARM ADDL 15 MIN: CPT | Performed by: PHARMACIST

## 2022-12-06 NOTE — PATIENT INSTRUCTIONS
"Recommendations from today's MTM visit:                                                    MTM (medication therapy management) is a service provided by a clinical pharmacist designed to help you get the most of out of your medicines.   Today we reviewed what your medicines are for, how to know if they are working, that your medicines are safe and how to make your medicine regimen as easy as possible.      I agree it'd be good to get you on 2mg of Ozempic per week if possible.  You could ask Dr. Chairez to try writing the prescription for 2mg once weekly (so you'd take 2 of the 1mg doses per week).  Some insurance plans have been covering this, but not all.    Follow-up: Return in about 3 months (around 3/6/2023) for Follow-up Medication Review.    It was great speaking with you today.  I value your experience and would be very thankful for your time in providing feedback in our clinic survey. In the next few days, you may receive an email or text message from [employer] with a link to a survey related to your  clinical pharmacist.\"     To schedule another MTM appointment, please call the clinic directly or you may call the MTM scheduling line at [phone] or toll-free at [phone].     My Clinical Pharmacist's contact information:                                                      Please feel free to contact me with any questions or concerns you have.      Tosin Gillette, Elvia, [facility]  Medication Therapy Management Provider  Pager: [phone]    "

## 2022-12-06 NOTE — PROGRESS NOTES
Medication Therapy Management (MTM) Encounter    ASSESSMENT:                            Medication Adherence/Access: No issues identified    Type 2 Diabetes: Patient is not meeting A1c goal of < 7%. Patient is not meeting goal of >70% time in target with continuous glucose monitoring.  I agree with increasing Ozempic to 2mg dose if possible (or perhaps changing to Mounjaro if not).    Hypertension/A. Fib: Stable. Patient is meeting blood pressure goal of < 130/80mmHg.     Hyperlipidemia: Stable - He is currently taking a high intensity statin which is indicated per the 2019 ACC/AHA guidelines for lipid management.    Pain: Stable.     Supplements: Stable.    PLAN:                            I agree it'd be good to get you on 2mg of Ozempic per week if possible.  You could ask Dr. Chairez to try writing the prescription for 2mg once weekly (so you'd take 2 of the 1mg doses per week).  Some insurance plans have been covering this, but not all.    Follow-up: Return in about 3 months (around 3/6/2023) for Follow-up Medication Review.    SUBJECTIVE/OBJECTIVE:                          Te Yoder is a 72 year old male coming in for a follow-up visit.  Today's visit is a follow-up MTM visit from 8/15/2022.     Reason for visit: Routine follow-up.    Tobacco: He reports that he has never smoked. He has never used smokeless tobacco.  Alcohol: 1-3 beverages / week    Medication Adherence/Access: no issues reported    Type 2 Diabetes:  Patient continues working closely with endocrinology (Dr. Chairez).  He's currently taking metformin 1000mg twice daily, glimepiride 6mg daily (dose increased since our last visit), Farxiga 10mg daily, Ozempic 1 mg weekly, and Humalog based on sliding scale with meals (taking 2-3 times per day as a correction).  He reports there was discussion about increasing Ozempic to 2mg dose but he couldn't get due to supply issues.  He was also prescribed Baqsimi by the endocrinologist to use as needed for  hypoglycemia, however he has not had to use it.  Patient is not experiencing side effects.    Blood sugar monitoring: Continuous Glucose Monitor - now using Dexcom G6    Symptoms of low blood sugar? None   Symptoms of high blood sugar? None.   Eye exam: up to date  Foot exam: up to date  Diet/Exercise: No changes, he's off work for the winter but has been going to the gym and plans to keep up with this over the winter  Aspirin: Not taking due to age and Xarelto use  Statin: Yes: rosuvastatin 20 mg once daily   ACEi/ARB: Yes: lisinopril 20 mg twice daily.   Urine Albumin:   Lab Results   Component Value Date    UMALCR 78.26 (H) 08/15/2022      Lab Results   Component Value Date    A1C 7.2 08/15/2022    A1C 8.7 11/11/2021    A1C 8.6 06/01/2021    A1C 9.2 04/15/2021    A1C 8.4 12/29/2020    A1C 7.3 07/06/2020    A1C 7.5 06/03/2019     GFR Estimate   Date Value Ref Range Status   08/15/2022 61 >60 mL/min/1.73m2 Final     Comment:     Effective December 21, 2021 eGFRcr in adults is calculated using the 2021 CKD-EPI creatinine equation which includes age and gender (Jf et al., NEJM, DOI: 10.1056/OAAMzd1731782)   06/01/2021 61 >60 mL/min/[1.73_m2] Final     Comment:     Non  GFR Calc  Starting 12/18/2018, serum creatinine based estimated GFR (eGFR) will be   calculated using the Chronic Kidney Disease Epidemiology Collaboration   (CKD-EPI) equation.       Wt Readings from Last 4 Encounters:   12/06/22 230 lb (104.3 kg)   11/08/22 229 lb 9.6 oz (104.1 kg)   08/15/22 231 lb (104.8 kg)   03/21/22 256 lb (116.1 kg)      Hypertension/A. Fib: Current medications include Xarelto 20mg daily, hydrochlorothiazide 25mg daily, lisinopril 20mg twice daily and metoprolol XL 50mg daily.  Patient does not self-monitor blood pressure.  Patient reports no current medication side effects. He denies signs of bruising, bleeding, or dizziness.  BP Readings from Last 3 Encounters:   12/06/22 124/64   11/08/22 119/82   08/15/22  109/64        Hyperlipidemia: Current therapy includes rosuvastatin 20mg. Patient denies muscle pains or other side effects.  The ASCVD Risk score (Jasper DK, et al., 2019) failed to calculate for the following reasons:    The valid total cholesterol range is 130 to 320 mg/dL     Recent Labs   Lab Test 08/15/22  1543 11/11/21  1423 09/15/21  1331 12/29/20  1020 06/10/15  0000 01/30/15  0808   CHOL 118 147  --  146   < > 123   HDL 32*  --   --  34*   < > 32*   LDL 67  --   --  89   < > 68   TRIG 96  --  291* 114   < > 113   CHOLHDLRATIO  --   --   --   --   --  3.8    < > = values in this interval not displayed.         Pain:  Te has not been taking acetaminophen recently, but has it available if needed for back pain.  No side effects reported when he does use it.    Supplements: Current supplements include Vitamin D and vitamin B-12 2500mcg, both now once a week at his doctor's recommendations (doses unknown).  He denies side effects.   Lab Results   Component Value Date    B12 760 11/11/2021      Vitamin D Deficiency Screening Results:  Lab Results   Component Value Date    VITDT 136 (H) 12/29/2020    VITDT 69 12/28/2017    VITDT 67 12/12/2013      Today's Vitals: /64   Wt 230 lb (104.3 kg)   BMI 34.97 kg/m    ----------------    I spent 30 minutes with this patient today. A copy of the visit note was provided to the patient's provider(s).    A summary of these recommendations was given to the patient.    Tosin Gillette, PharmD, BCACP  Medication Therapy Management Provider  Pager: [phone]      Medication Therapy Recommendations  Diabetes mellitus due to underlying condition with diabetic nephropathy, without long-term current use of insulin (H)    Current Medication: Semaglutide, 1 MG/DOSE, 4 MG/3ML SOPN   Rationale: Dose too low - Dosage too low - Effectiveness   Recommendation: Increase Dose - Ozempic (2 MG/DOSE) 8 MG/3ML Sopn   Status: Contact Provider - Awaiting Response

## 2023-01-16 ENCOUNTER — HOSPITAL ENCOUNTER (OUTPATIENT)
Dept: GENERAL RADIOLOGY | Facility: CLINIC | Age: 73
Discharge: HOME | End: 2023-01-16
Attending: UROLOGY | Admitting: UROLOGY
Payer: COMMERCIAL

## 2023-01-16 ENCOUNTER — OFFICE VISIT (OUTPATIENT)
Dept: UROLOGY | Facility: CLINIC | Age: 73
End: 2023-01-16
Payer: COMMERCIAL

## 2023-01-16 VITALS — BODY MASS INDEX: 34.86 KG/M2 | HEIGHT: 68 IN | WEIGHT: 230 LBS

## 2023-01-16 DIAGNOSIS — N40.1 BENIGN PROSTATIC HYPERPLASIA WITH LOWER URINARY TRACT SYMPTOMS, SYMPTOM DETAILS UNSPECIFIED: Primary | ICD-10-CM

## 2023-01-16 DIAGNOSIS — N20.0 NEPHROLITHIASIS: Primary | ICD-10-CM

## 2023-01-16 DIAGNOSIS — N40.1 BENIGN PROSTATIC HYPERPLASIA WITH LOWER URINARY TRACT SYMPTOMS, SYMPTOM DETAILS UNSPECIFIED: ICD-10-CM

## 2023-01-16 DIAGNOSIS — N20.0 NEPHROLITHIASIS: ICD-10-CM

## 2023-01-16 PROBLEM — U07.1 COVID-19 VIRUS INFECTION: Status: ACTIVE | Noted: 2020-12-04

## 2023-01-16 LAB
ALBUMIN UR-MCNC: NEGATIVE MG/DL
APPEARANCE UR: CLEAR
BILIRUB UR QL STRIP: NEGATIVE
COLOR UR AUTO: YELLOW
GLUCOSE UR STRIP-MCNC: 500 MG/DL
HGB UR QL STRIP: ABNORMAL
KETONES UR STRIP-MCNC: NEGATIVE MG/DL
LEUKOCYTE ESTERASE UR QL STRIP: NEGATIVE
NITRATE UR QL: NEGATIVE
PH UR STRIP: 5.5 [PH] (ref 5–7)
PR INTERVAL - MUSE: 11
PSA SERPL-MCNC: 0.95 UG/L (ref 0–4)
SP GR UR STRIP: 1.02 (ref 1–1.03)
UROBILINOGEN UR STRIP-ACNC: 1 E.U./DL

## 2023-01-16 PROCEDURE — 99214 OFFICE O/P EST MOD 30 MIN: CPT | Mod: 25 | Performed by: UROLOGY

## 2023-01-16 PROCEDURE — 36415 COLL VENOUS BLD VENIPUNCTURE: CPT | Performed by: UROLOGY

## 2023-01-16 PROCEDURE — 74018 RADEX ABDOMEN 1 VIEW: CPT

## 2023-01-16 PROCEDURE — 51798 US URINE CAPACITY MEASURE: CPT | Performed by: UROLOGY

## 2023-01-16 PROCEDURE — 81003 URINALYSIS AUTO W/O SCOPE: CPT | Mod: QW | Performed by: UROLOGY

## 2023-01-16 PROCEDURE — 84153 ASSAY OF PSA TOTAL: CPT | Performed by: UROLOGY

## 2023-01-16 RX ORDER — CX-024414 0.2 MG/ML
INJECTION, SUSPENSION INTRAMUSCULAR
COMMUNITY
Start: 2022-04-04 | End: 2023-03-09

## 2023-01-16 RX ORDER — PROCHLORPERAZINE 25 MG/1
SUPPOSITORY RECTAL
COMMUNITY
Start: 2022-12-21

## 2023-01-16 RX ORDER — ALBUTEROL SULFATE 108 UG/1
AEROSOL, METERED RESPIRATORY (INHALATION)
COMMUNITY
Start: 2022-02-22

## 2023-01-16 ASSESSMENT — PAIN SCALES - GENERAL: PAINLEVEL: NO PAIN (0)

## 2023-01-16 NOTE — PROGRESS NOTES
Assessment & Plan   ASSESSMENT and PLAN  72 year old male here for reevaluation of prostate cancer screening, nephrolithiasis, erectile dysfunction.    1.  Mildly enlarged prostate  2.  Prostate cancer screening  No LUTS and PSA stable with normal exam.  Follow-up in 1 to 2 years with PSA    3.  Nephrolithiasis  Been stable without any stones for over a few years, defer further KUBs unless symptomatic    4.  Erectile dysfunction  Discussed that we could repeat a intracavernosal injection trial in the future if the patient wishes.  Last time he did it was with medication from outside the [place] this may have limited efficacy.  If he is interested he will let us know and we will set up teaching in the office.  He is not interested in a penile prosthesis.    Plan:  -Follow-up in 1 to 2 years with PSA and office visit for digital rectal exam    Time spent: 15 minutes spent on the date of the encounter doing chart review, history and exam, documentation and further activities as noted above.    Osiel Taylor MD   Urology  [facility]         Subjective     CHIEF COMPLAINT   follow-up of kidney stones, mildly enlarged proximal and erectile dysfunction.      HPI   Te Yoder is a very pleasant 72 year old male who presents with a history of multiple urologic issues.    Regarding kidney stones, he has not had any issues with stone passage, pain or hematuria in the last year.    Regarding lower urinary tract symptoms, no changes in urination.    Regarding erectile dysfunction, no improvement or worsening over the last year.  Did try ICI in the past from international source and not still interested in pursuing this in the future.    Imaging:  KUB from January 16, 2023 (images personally reviewed): No new nephrolithiasis appreciable, stable pelvic phleboliths.    Labs:  PSA 0.95 ng/dl today    AUA symptom score 1  Quality life mostly satisfied    PVR by ultrasound of 11 mL     Objective     PHYSICAL  "EXAM  Patient is a 72 year old  male   Vitals: Height 1.727 m (5' 8\"), weight 104.3 kg (230 lb).  Body mass index is 34.97 kg/m .  General: No acute distress  JACQUELINE: Normal rectal tone, prostate approximately 40 g in size without concerning nodule         "

## 2023-01-16 NOTE — NURSING NOTE
Chief Complaint   Patient presents with     Benign Prostatic Hypertrophy     With History of Nephrolithiasis. Go over PSA    PVR-11ml  Stephany Brown LPN

## 2023-01-16 NOTE — LETTER
1/16/2023       RE: Te Yoder  [address]  [facility] [id number]     Dear Colleague,    Thank you for referring your patient, Te Yoder, to the [facility] at [facility]. Please see a copy of my visit note below.    Assessment & Plan   ASSESSMENT and PLAN  72 year old male here for reevaluation of prostate cancer screening, nephrolithiasis, erectile dysfunction.    1.  Mildly enlarged prostate  2.  Prostate cancer screening  No LUTS and PSA stable with normal exam.  Follow-up in 1 to 2 years with PSA    3.  Nephrolithiasis  Been stable without any stones for over a few years, defer further KUBs unless symptomatic    4.  Erectile dysfunction  Discussed that we could repeat a intracavernosal injection trial in the future if the patient wishes.  Last time he did it was with medication from outside the [place] this may have limited efficacy.  If he is interested he will let us know and we will set up teaching in the office.  He is not interested in a penile prosthesis.    Plan:  -Follow-up in 1 to 2 years with PSA and office visit for digital rectal exam    Time spent: 15 minutes spent on the date of the encounter doing chart review, history and exam, documentation and further activities as noted above.    Osiel Taylor MD   Urology  [facility]        Subjective     CHIEF COMPLAINT   follow-up of kidney stones, mildly enlarged proximal and erectile dysfunction.      HPI   Te Yoder is a very pleasant 72 year old male who presents with a history of multiple urologic issues.    Regarding kidney stones, he has not had any issues with stone passage, pain or hematuria in the last year.    Regarding lower urinary tract symptoms, no changes in urination.    Regarding erectile dysfunction, no improvement or worsening over the last year.  Did try ICI in the past from international source and not still interested in  "pursuing this in the future.    Imaging:  KUB from January 16, 2023 (images personally reviewed): No new nephrolithiasis appreciable, stable pelvic phleboliths.    Labs:  PSA 0.95 ng/dl today    AUA symptom score 1  Quality life mostly satisfied    PVR by ultrasound of 11 mL    Objective     PHYSICAL EXAM  Patient is a 72 year old  male   Vitals: Height 1.727 m (5' 8\"), weight 104.3 kg (230 lb).  Body mass index is 34.97 kg/m .  General: No acute distress  JACQUELINE: Normal rectal tone, prostate approximately 40 g in size without concerning nodule      "

## 2023-03-09 ENCOUNTER — OFFICE VISIT (OUTPATIENT)
Dept: FAMILY MEDICINE | Facility: CLINIC | Age: 73
End: 2023-03-09
Payer: COMMERCIAL

## 2023-03-09 VITALS
HEIGHT: 68 IN | TEMPERATURE: 97.5 F | SYSTOLIC BLOOD PRESSURE: 105 MMHG | RESPIRATION RATE: 18 BRPM | WEIGHT: 229.7 LBS | HEART RATE: 93 BPM | OXYGEN SATURATION: 97 % | BODY MASS INDEX: 34.81 KG/M2 | DIASTOLIC BLOOD PRESSURE: 66 MMHG

## 2023-03-09 DIAGNOSIS — I25.10 CORONARY ARTERY CALCIFICATION: ICD-10-CM

## 2023-03-09 DIAGNOSIS — E66.01 MORBID OBESITY DUE TO EXCESS CALORIES (H): ICD-10-CM

## 2023-03-09 DIAGNOSIS — N18.2 CKD (CHRONIC KIDNEY DISEASE) STAGE 2, GFR 60-89 ML/MIN: ICD-10-CM

## 2023-03-09 DIAGNOSIS — I10 PRIMARY HYPERTENSION: ICD-10-CM

## 2023-03-09 DIAGNOSIS — E11.21 TYPE 2 DIABETES MELLITUS WITH DIABETIC NEPHROPATHY, WITH LONG-TERM CURRENT USE OF INSULIN (H): ICD-10-CM

## 2023-03-09 DIAGNOSIS — G47.33 OSA (OBSTRUCTIVE SLEEP APNEA): ICD-10-CM

## 2023-03-09 DIAGNOSIS — E78.5 HYPERLIPIDEMIA LDL GOAL <100: ICD-10-CM

## 2023-03-09 DIAGNOSIS — Z79.4 TYPE 2 DIABETES MELLITUS WITH DIABETIC NEPHROPATHY, WITH LONG-TERM CURRENT USE OF INSULIN (H): ICD-10-CM

## 2023-03-09 DIAGNOSIS — I48.91 ATRIAL FIBRILLATION, UNSPECIFIED TYPE (H): ICD-10-CM

## 2023-03-09 DIAGNOSIS — G62.9 PERIPHERAL POLYNEUROPATHY: ICD-10-CM

## 2023-03-09 DIAGNOSIS — Z00.00 ROUTINE GENERAL MEDICAL EXAMINATION AT A HEALTH CARE FACILITY: Primary | ICD-10-CM

## 2023-03-09 LAB — HBA1C MFR BLD: 7.6 % (ref 0–5.6)

## 2023-03-09 PROCEDURE — 36415 COLL VENOUS BLD VENIPUNCTURE: CPT | Performed by: INTERNAL MEDICINE

## 2023-03-09 PROCEDURE — G0439 PPPS, SUBSEQ VISIT: HCPCS | Performed by: INTERNAL MEDICINE

## 2023-03-09 PROCEDURE — 83036 HEMOGLOBIN GLYCOSYLATED A1C: CPT | Performed by: INTERNAL MEDICINE

## 2023-03-09 PROCEDURE — 99214 OFFICE O/P EST MOD 30 MIN: CPT | Mod: 25 | Performed by: INTERNAL MEDICINE

## 2023-03-09 ASSESSMENT — ENCOUNTER SYMPTOMS
DIARRHEA: 0
DYSURIA: 0
JOINT SWELLING: 0
HEADACHES: 0
EYE PAIN: 0
SORE THROAT: 0
NAUSEA: 0
HEMATOCHEZIA: 0
HEARTBURN: 0
NERVOUS/ANXIOUS: 0
MYALGIAS: 0
CHILLS: 0
DIZZINESS: 0
ARTHRALGIAS: 1
WEAKNESS: 0
CONSTIPATION: 0
ABDOMINAL PAIN: 0
PALPITATIONS: 0
SHORTNESS OF BREATH: 0
HEMATURIA: 0
FREQUENCY: 0
PARESTHESIAS: 0
FEVER: 0
COUGH: 0

## 2023-03-09 ASSESSMENT — ACTIVITIES OF DAILY LIVING (ADL): CURRENT_FUNCTION: NO ASSISTANCE NEEDED

## 2023-03-09 ASSESSMENT — PAIN SCALES - GENERAL: PAINLEVEL: NO PAIN (0)

## 2023-03-09 NOTE — RESULT ENCOUNTER NOTE
The following letter pertains to your most recent diagnostic tests:    This number has crept up a bit, but remains at our goal of hemoglobin A1c at least less than 8.  Keep working on reducing diet carbohydrate consumption (sugars and starches) and increasing physical activity.  We should check again in 6 months.           Sincerely,    Dr. Akhtar

## 2023-03-09 NOTE — PROGRESS NOTES
"SUBJECTIVE:   Te is a 72 year old who presents for Preventive Visit.  Patient has been advised of split billing requirements and indicates understanding: Yes  Are you in the first 12 months of your Medicare coverage?  No    Healthy Habits:     In general, how would you rate your overall health?  Good    Frequency of exercise:  1 day/week    Duration of exercise:  15-30 minutes    Do you usually eat at least 4 servings of fruit and vegetables a day, include whole grains    & fiber and avoid regularly eating high fat or \"junk\" foods?  No    Taking medications regularly:  Yes    Medication side effects:  Not applicable    Ability to successfully perform activities of daily living:  No assistance needed    Home Safety:  No safety concerns identified    Hearing Impairment:  No hearing concerns    In the past 6 months, have you been bothered by leaking of urine?  No    In general, how would you rate your overall mental or emotional health?  Good      PHQ-2 Total Score: 0    Additional concerns today:  No      Have you ever done Advance Care Planning? (For example, a Health Directive, POLST, or a discussion with a medical provider or your loved ones about your wishes): No, advance care planning information given to patient to review.  Patient declined advance care planning discussion at this time.       Fall risk  Fallen 2 or more times in the past year?: Yes  Any fall with injury in the past year?: No    Cognitive Screening   1) Repeat 3 items (Leader, Season, Table)    2) Clock draw: NORMAL  3) 3 item recall: Recalls 3 objects  Results: 3 items recalled: COGNITIVE IMPAIRMENT LESS LIKELY    Mini-CogTM Copyright EDWIN Marmolejo. Licensed by the author for use in [facility]; reprinted with permission (bruce@.[email or web address]). All rights reserved.      Do you have sleep apnea, excessive snoring or daytime drowsiness?: yes    Reviewed and updated as needed this visit by clinical staff    Allergies  Meds              Reviewed " and updated as needed this visit by Provider                 Social History     Tobacco Use     Smoking status: Never     Smokeless tobacco: Never   Substance Use Topics     Alcohol use: Yes     Alcohol/week: 0.0 standard drinks     Comment: 4 drinks per week         Alcohol Use 3/9/2023   Prescreen: >3 drinks/day or >7 drinks/week? No         Current providers sharing in care for this patient include:   Patient Care Team:  Gonzalez Akhtar MD as PCP - General (Internal Medicine)  Ramakrishna Lombardi (Ophthalmology)  Tosin Gillette PharmD as Pharmacist (Pharmacist)  Moncho Chairez MD as MD (Endocrinology, Diabetes, and Metabolism)  Osiel Taylor MD as Assigned Surgical Provider  Tosin Gillette PharmD as Assigned MTM Pharmacist  Gonzalez Akhtar MD as Assigned PCP    The following health maintenance items are reviewed in Epic and correct as of today:  Health Maintenance   Topic Date Due     EYE EXAM  12/09/2022     A1C  02/15/2023     BMP  08/15/2023     LIPID  08/15/2023     MICROALBUMIN  08/15/2023     DIABETIC FOOT EXAM  11/08/2023     ANNUAL REVIEW OF HM ORDERS  11/08/2023     COLORECTAL CANCER SCREENING  11/25/2023     MEDICARE ANNUAL WELLNESS VISIT  03/09/2024     FALL RISK ASSESSMENT  03/09/2024     ADVANCE CARE PLANNING  03/09/2028     DTAP/TDAP/TD IMMUNIZATION (3 - Td or Tdap) 11/08/2032     HEPATITIS C SCREENING  Completed     PHQ-2 (once per calendar year)  Completed     INFLUENZA VACCINE  Completed     Pneumococcal Vaccine: 65+ Years  Completed     URINALYSIS  Completed     ZOSTER IMMUNIZATION  Completed     AORTIC ANEURYSM SCREENING (SYSTEM ASSIGNED)  Completed     COVID-19 Vaccine  Completed     IPV IMMUNIZATION  Aged Out     MENINGITIS IMMUNIZATION  Aged Out     Patient Active Problem List   Diagnosis     Impotence of organic origin     Hypertension     Overweight BMI 40-45     Hyperlipidemia LDL goal <100     CKD (chronic kidney disease) stage 2, GFR 60-89 ml/min     ROC (obstructive sleep  apnea)     Advanced directives, counseling/discussion     Low HDL (under 40)     Anemia     B12 deficiency     Phrenic neuropathy     Partial optic atrophy     Atrial fibrillation (H)     Seasonal allergic rhinitis     Peripheral neuropathy     Cervical radiculopathy     Coronary artery calcification     Diabetes mellitus due to underlying condition with diabetic nephropathy, without long-term current use of insulin (H)     Diabetes mellitus, type 2 (H)     COVID-19 virus infection     Past Surgical History:   Procedure Laterality Date     COLONOSCOPY  2003     COLONOSCOPY  11/25/2013    Procedure: COLONOSCOPY;  COLONOSCOPY;  Surgeon: Uday Taylor MD;  Location:  GI       Social History     Tobacco Use     Smoking status: Never     Smokeless tobacco: Never   Substance Use Topics     Alcohol use: Yes     Alcohol/week: 0.0 standard drinks     Comment: 4 drinks per week     Family History   Problem Relation Age of Onset     Heart Disease Father         disease     Arthritis Mother      Cancer - colorectal No family hx of          Current Outpatient Medications   Medication Sig Dispense Refill     acetaminophen (TYLENOL) 650 MG CR tablet Take 1,300 mg by mouth every 8 hours as needed for mild pain or fever       ASPIRIN NOT PRESCRIBED, INTENTIONAL, Reported on 3/8/2017       BAQSIMI ONE PACK 3 MG/DOSE POWD For use with severe hypoglycemia       Continuous Blood Gluc Sensor (DEXCOM G6 SENSOR) MISC Change every 10 days.       Continuous Blood Gluc Transmit (DEXCOM G6 TRANSMITTER) MISC        Cyanocobalamin (VITAMIN B-12 SL) Take by mouth once a week Dose unknown       dapagliflozin (FARXIGA) 10 MG TABS tablet Take 10 mg by mouth daily       glimepiride (AMARYL) 4 MG tablet Take 6 mg by mouth daily       hydrochlorothiazide (MICROZIDE) 12.5 MG capsule TAKE TWO CAPSULES BY MOUTH DAILY 180 capsule 2     insulin lispro (HUMALOG KWIKPEN) 100 UNIT/ML (1 unit dial) KWIKPEN Inject Subcutaneous 3 times daily (before meals)  According to sliding scale       lisinopril (ZESTRIL) 20 MG tablet Take 1 tablet (20 mg) by mouth 2 times daily 180 tablet 0     metFORMIN (GLUCOPHAGE) 1000 MG tablet Take 1 tablet (1,000 mg) by mouth 2 times daily (take with meals) 180 tablet 3     metoprolol succinate ER (TOPROL XL) 50 MG 24 hr tablet Take 1 tablet (50 mg) by mouth daily 90 tablet 3     order for DME DREAMSTATION  5-15CM/H20  NASAL COMFORT BLUE       rivaroxaban ANTICOAGULANT (XARELTO ANTICOAGULANT) 20 MG TABS tablet TAKE ONE TABLET BY MOUTH ONE TIME DAILY WITH DINNER 90 tablet 3     rosuvastatin (CRESTOR) 20 MG tablet Take 1 tablet (20 mg) by mouth daily 90 tablet 3     Semaglutide, 1 MG/DOSE, 4 MG/3ML SOPN Inject 1 mg Subcutaneous once a week       ULTICARE 29G X 12.7MM insulin pen needle USE ONCE DAILY       VITAMIN D (CHOLECALCIFEROL) PO Take by mouth once a week Dose unknown       Allergies   Allergen Reactions     Simvastatin Other (See Comments)     Muscle aches, elevated CK levels       Review of Systems   Constitutional: Negative for chills and fever.   HENT: Negative for congestion, ear pain, hearing loss and sore throat.    Eyes: Negative for pain and visual disturbance.   Respiratory: Negative for cough and shortness of breath.    Cardiovascular: Negative for chest pain, palpitations and peripheral edema.   Gastrointestinal: Negative for abdominal pain, constipation, diarrhea, heartburn, hematochezia and nausea.   Genitourinary: Positive for impotence. Negative for dysuria, frequency, genital sores, hematuria, penile discharge and urgency.   Musculoskeletal: Positive for arthralgias. Negative for joint swelling and myalgias.   Skin: Negative for rash.   Neurological: Negative for dizziness, weakness, headaches and paresthesias.   Psychiatric/Behavioral: Negative for mood changes. The patient is not nervous/anxious.          OBJECTIVE:   /66 (BP Location: Right arm, Patient Position: Sitting, Cuff Size: Adult Large)   Pulse 93    "Temp 97.5  F (36.4  C) (Temporal)   Resp 18   Ht 1.727 m (5' 8\")   Wt 104.2 kg (229 lb 11.2 oz)   SpO2 97%   BMI 34.93 kg/m   Estimated body mass index is 34.93 kg/m  as calculated from the following:    Height as of this encounter: 1.727 m (5' 8\").    Weight as of this encounter: 104.2 kg (229 lb 11.2 oz).  Physical Exam  GENERAL: healthy, alert and no distress  EYES: Eyes grossly normal to inspection, PERRL and conjunctivae and sclerae normal  HENT: ear canals and TM's normal, nose and mouth without ulcers or lesions  NECK: no adenopathy, no asymmetry, masses, or scars and thyroid normal to palpation  RESP: lungs clear to auscultation - no rales, rhonchi or wheezes  CV: regular rate and rhythm, normal S1 S2, no S3 or S4, no murmur, click or rub, no peripheral edema and peripheral pulses strong  ABDOMEN: soft, nontender, no hepatosplenomegaly, no masses and bowel sounds normal  MS: no gross musculoskeletal defects noted, no edema  SKIN: no suspicious lesions or rashes  NEURO: Normal strength and tone, mentation intact and speech normal  PSYCH: mentation appears normal, affect normal/bright  Diabetic foot exam: normal DP and PT pulses, no trophic changes or ulcerative lesions and normal sensory exam    Labs pending     ASSESSMENT / PLAN:       ICD-10-CM    1. Routine general medical examination at a health care facility  Z00.00       2. Type 2 diabetes mellitus with diabetic nephropathy, with long-term current use of insulin (H)  E11.21 HEMOGLOBIN A1C    Z79.4 OFFICE/OUTPT VISIT,EST,LEVL IV      3. Atrial fibrillation, unspecified type (H)  I48.91 OFFICE/OUTPT VISIT,EST,LEVL IV      4. CKD (chronic kidney disease) stage 2, GFR 60-89 ml/min  N18.2 OFFICE/OUTPT VISIT,EST,LEVL IV      5. Morbid obesity due to excess calories (H)  E66.01 OFFICE/OUTPT VISIT,EST,LEVL IV      6. Coronary artery calcification  I25.10 OFFICE/OUTPT VISIT,EST,LEVL IV    I25.84       7. Hyperlipidemia LDL goal <100  E78.5 OFFICE/OUTPT " VISIT,EST,LEVL IV      8. Primary hypertension  I10 OFFICE/OUTPT VISIT,EST,LEVL IV      9. Peripheral polyneuropathy  G62.9 OFFICE/OUTPT VISIT,EST,LEVL IV      10. ROC (obstructive sleep apnea)  G47.33 OFFICE/OUTPT VISIT,EST,LEVL IV      Recheck A1c, he sees Dr. Chairez too  Rate controled, on xarelto for prophylaxis  Renal function was stable 8/2022  Counseled on diet and exercise interventions to promote weight loss   On statin therapy  Blood pressure OK  He had some questions about neuropathy symptom management which I did my best to answer     Patient has been advised of split billing requirements and indicates understanding: Yes      COUNSELING:  Reviewed preventive health counseling, as reflected in patient instructions  Special attention given to:       Consider AAA screening for ages 65-75 and smoking history; done        Regular exercise       Healthy diet/nutrition       Immunizations    Vaccines are up to date              Hepatitis C screening       HIV screening for high risk patient       Consider lung cancer screening for ages 55-80 years (77 for Medicare) and 20 pack-year smoking history ; never smoker       Colon cancer screening; will be due for colonoscopy in November        Prostate cancer screening; PSA was checked by urology         He reports that he has never smoked. He has never used smokeless tobacco.      Appropriate preventive services were discussed with this patient, including applicable screening as appropriate for cardiovascular disease, diabetes, osteopenia/osteoporosis, and glaucoma.  As appropriate for age/gender, discussed screening for colorectal cancer, prostate cancer, breast cancer, and cervical cancer. Checklist reviewing preventive services available has been given to the patient.    Reviewed patients plan of care and provided an AVS. The Basic Care Plan (routine screening as documented in Health Maintenance) for Te meets the Care Plan requirement. This Care Plan has been  established and reviewed with the Patient.      Gonzalez Akhtar MD  [facility]    Identified Health Risks:

## 2023-03-27 ENCOUNTER — TELEPHONE (OUTPATIENT)
Dept: FAMILY MEDICINE | Facility: CLINIC | Age: 73
End: 2023-03-27
Payer: COMMERCIAL

## 2023-03-27 NOTE — LETTER
March 28, 2023         Te Yoder has stable atrial fibrillation and diabetes.  Both conditions are well controlled.  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  He demonstrates adequate balance as well.        Sincerely,    Gonzalez Akhtar MD

## 2023-03-27 NOTE — TELEPHONE ENCOUNTER
Forms/Letter Request    Type of form/letter: Work Occupational Health form-please review, approve, and fax to [phone]    Have you been seen for this request: No    Do we have the form/letter: Yes:     When is form/letter needed by: ASAP    How would you like the form/letter returned: Fax to [phone]    Patient Notified form requests are processed in 3-5 business days:Yes    Could we send this information to you in [employer] or would you prefer to receive a phone call?:   Patient would prefer a phone call   Okay to leave a detailed message?: Yes at Cell number on file:    Telephone Information:   Mobile [phone]

## 2023-03-27 NOTE — TELEPHONE ENCOUNTER
I have not seen the form  Can we have it refaxed  Also, depending on nature of form, visit may be required

## 2023-04-04 NOTE — PROGRESS NOTES
Medication Therapy Management (MTM) Encounter    ASSESSMENT:                            Medication Adherence/Access: Educated on how patient would be able to obtain Paxlovid if he tested positive for COVID in the future.     Type 2 Diabetes: Patient's current A1c is meeting less strict goal of <8% but not stricter goal of <7%, educated on proper administration of Humalog today to help prevent spikes in blood sugars, additionally recommended looking into a device/cooler that patient could keep their insulin pen in when working in the summer in elevated temperatures. Would recommend patient switch from Ozempic to Mounjaro for additional support with blood sugars and weight loss, patient plans to discuss at upcoming follow up with Dr. Chairez or Petar MCKENNA at his current endocrinology clinic.     Afib/Hypertension: Patient's Xarelto dose is not needed to be adjusted with the patient's current kidney function, will continue to monitor. Blood pressure at goal of <130/80.     Hyperlipidemia: Stable, LDL at goal <70.     Pain: Stable.     Supplements: Stable.      PLAN:                            1. I would recommend trying your best to inject the Humalog insulin 5-10 minutes before meals.   2. I would recommend asking Dr. Chairez or Willy MCKENNA about changing from Ozempic to Mounjaro    Follow-up: 3 months, sooner if needed.     SUBJECTIVE/OBJECTIVE:                          Te Yoder is a 72 year old male coming in for a follow-up visit.  Today's visit is a follow-up MTM visit from 12/6/2022.     Reason for visit: Diabetes follow up.     Allergies/ADRs: Reviewed in chart  Past Medical History: Reviewed in chart  Tobacco: He reports that he has never smoked. He has never used smokeless tobacco.  Alcohol: not able to assess today.       Medication Adherence/Access: Patient was interested in talking about Paxlovid and what he would have to do if he ever wanted to get Paxlovid if he tested positive COVID.     Type 2 Diabetes:   Currently taking Farxiga 10mg daily, glimepiride 8mg daily, metformin 1000mg twice daily, Humalog injecting usually after each meal based on a sliding scale 250- usually 15-20 units, metformin 1000mg twice daily, and Ozempic 2mg weekly. Patient is not experiencing side effects.Mentions that when he is working in the summer outside it is hard to have an insulin pen on him and keep it at the correct temperature.   Blood sugar monitoring: Continuous Glucose Monitor. Ranges (per continuous glucose monitor): See below          Symptoms of low blood sugar? none  Symptoms of high blood sugar? none  Eye exam: due  Foot exam: up to date  Diet/Exercise: mostly microwaves meals  Aspirin: Not taking due to age & Xarelto use  Statin: Yes: rosuvastatin 20mg daily  ACEi/ARB: Yes: lisinopril 20mg daily.   Urine Albumin:   Lab Results   Component Value Date    UMALCR 78.26 (H) 08/15/2022      Lab Results   Component Value Date    A1C 7.6 03/09/2023    A1C 7.2 08/15/2022    A1C 8.7 11/11/2021    A1C 8.6 06/01/2021    A1C 9.2 04/15/2021    A1C 8.4 12/29/2020    A1C 7.3 07/06/2020    A1C 7.5 06/03/2019     Afib/Hypertension: Patient is currently taking Xarelto 20mg daily for anticoagulation. Patient reports no current concerns of bruising or bleeding. Patient is also taking hydrochlorothiazide 25mg daily, lisinopril 20mg twice daily, metoprolol succinate 50mg daily.  Patient does not self-monitor blood pressure.  Patient reports no current medication side effects.  BP Readings from Last 3 Encounters:   04/05/23 113/66   03/09/23 105/66   12/06/22 124/64     Hyperlipidemia: Current therapy includes rosuvastatin 20mg daily.  Patient reports no significant myalgias or other side effects.  Recent Labs   Lab Test 08/15/22  1543 11/11/21  1423 09/15/21  1331 12/29/20  1020 06/10/15  0000 01/30/15  0808   CHOL 118 147  --  146   < > 123   HDL 32*  --   --  34*   < > 32*   LDL 67  --   --  89   < > 68   TRIG 96  --  291* 114   < > 113    CHOLHDLRATIO  --   --   --   --   --  3.8    < > = values in this interval not displayed.     Pain: Current therapy includes Acetaminophen 650mg as needed for general pains, usually using more in the summer for backaches when he is working.     Supplements: Current therapy includes vitamin D3 1 tablet daily and vitamin b12 2 tablets daily. Denies side effects, is not sure of the current dosages.     Today's Vitals: /66   Pulse 80   Wt 232 lb 6.4 oz (105.4 kg)   BMI 35.34 kg/m    ----------------  I spent 35 minutes with this patient today. I offer these suggestions for consideration by Dr. Chairez. A copy of the visit note was provided to the patient's provider(s).    A summary of these recommendations was given to the patient.    uSjatha Selby, PharmD  Medication Therapy Management Resident  [phone]    Preceptor cosignature: Te Yoder was seen independently by Dr. Selby. I have reviewed the assessment and plan. Bria Barrett, PharmD, SARINA, BCACP     Medication Therapy Recommendations  Diabetes mellitus due to underlying condition with diabetic nephropathy, without long-term current use of insulin (H)    Current Medication: Semaglutide, 1 MG/DOSE, 4 MG/3ML SOPN   Rationale: Dose too low - Dosage too low - Effectiveness   Recommendation: Increase Dose - Ozempic (2 MG/DOSE) 8 MG/3ML Sopn   Status: Accepted per Provider          Current Medication: insulin lispro (HUMALOG KWIKPEN) 100 UNIT/ML (1 unit dial) KWIKPEN   Rationale: Unsafe medication for the patient - Adverse medication event - Safety   Recommendation: Change Medication - Ozempic (2 MG/DOSE) 8 MG/3ML Sopn   Status: No Longer Relevant          Current Medication: insulin lispro (HUMALOG KWIKPEN) 100 UNIT/ML (1 unit dial) KWIKPEN   Rationale: Does not understand instructions - Adherence - Adherence   Recommendation: Provide Education   Status: Patient Agreed - Adherence/Education         Diabetes mellitus, type 2 (H)    Current  Medication: Semaglutide, 1 MG/DOSE, 4 MG/3ML SOPN   Rationale: More effective medication available - Ineffective medication - Effectiveness   Recommendation: Change Medication - Mounjaro 2.5 MG/0.5ML Sopn   Status: Contact Provider - Awaiting Response            2 medication therapy problems were identified today but only one has been resolved- 1 is awaiting provider response, a previous medication therapy problem was resolved during this visit as well.

## 2023-04-05 ENCOUNTER — OFFICE VISIT (OUTPATIENT)
Dept: PHARMACY | Facility: CLINIC | Age: 73
End: 2023-04-05
Payer: COMMERCIAL

## 2023-04-05 VITALS
HEART RATE: 80 BPM | DIASTOLIC BLOOD PRESSURE: 66 MMHG | SYSTOLIC BLOOD PRESSURE: 113 MMHG | WEIGHT: 232.4 LBS | BODY MASS INDEX: 35.34 KG/M2

## 2023-04-05 DIAGNOSIS — E78.5 HYPERLIPIDEMIA LDL GOAL <100: ICD-10-CM

## 2023-04-05 DIAGNOSIS — R52 INTERMITTENT PAIN: ICD-10-CM

## 2023-04-05 DIAGNOSIS — Z79.4 TYPE 2 DIABETES MELLITUS WITH DIABETIC NEPHROPATHY, WITH LONG-TERM CURRENT USE OF INSULIN (H): Primary | ICD-10-CM

## 2023-04-05 DIAGNOSIS — I48.91 ATRIAL FIBRILLATION, UNSPECIFIED TYPE (H): ICD-10-CM

## 2023-04-05 DIAGNOSIS — I10 ESSENTIAL HYPERTENSION: ICD-10-CM

## 2023-04-05 DIAGNOSIS — Z78.9 TAKES DIETARY SUPPLEMENTS: ICD-10-CM

## 2023-04-05 DIAGNOSIS — E11.21 TYPE 2 DIABETES MELLITUS WITH DIABETIC NEPHROPATHY, WITH LONG-TERM CURRENT USE OF INSULIN (H): Primary | ICD-10-CM

## 2023-04-05 PROCEDURE — 99605 MTMS BY PHARM NP 15 MIN: CPT

## 2023-04-05 PROCEDURE — 99607 MTMS BY PHARM ADDL 15 MIN: CPT

## 2023-04-05 NOTE — PATIENT INSTRUCTIONS
"Recommendations from today's MTM visit:                                                      1. I would recommend trying your best to inject the Humalog insulin 5-10 minutes before meals.   2. I would recommend asking Dr. Chairez or Willy MCKENNA about changing from Ozempic to Mounjaro    Follow-up: 3 months, sooner if needed.     It was great speaking with you today.  I value your experience and would be very thankful for your time in providing feedback in our clinic survey. In the next few days, you may receive an email or text message from [employer] with a link to a survey related to your  clinical pharmacist.\"     To schedule another MTM appointment, please call the clinic directly or you may call the MTM scheduling line at [phone] or toll-free at [phone].     My Clinical Pharmacist's contact information:                                                      Please feel free to contact me with any questions or concerns you have.      Sujatha Selby, PharmD  Medication Therapy Management Resident  [phone]     "

## 2023-04-05 NOTE — LETTER
_  Medication List        Prepared on: Apr 5, 2023     Bring your Medication List when you go to the doctor, hospital, or   emergency room. And, share it with your family or caregivers.     Note any changes to how you take your medications.  Cross out medications when you no longer use them.    Medication How I take it Why I use it Prescriber   acetaminophen (TYLENOL) 650 MG CR tablet Take 1,300 mg by mouth every 8 hours as needed for mild pain or fever  Pain Patient Reported   BAQSIMI ONE PACK 3 MG/DOSE POWD For use with severe hypoglycemia  Low blood sugars HAL MARTIN   Continuous Blood Gluc Sensor (DEXCOM G6 SENSOR) MISC Use to test blood sugars & change every 10 days.  Testing blood sugars HAL MARTIN   Continuous Blood Gluc Transmit (DEXCOM G6 TRANSMITTER) MISC  Use to test blood sugars  Testing blood sugars HAL MARTIN   Cyanocobalamin (VITAMIN B-12 SL) Take 2 tablets by mouth daily Dose unknown  General health Patient Reported   dapagliflozin (FARXIGA) 10 MG TABS tablet Take 10 mg by mouth daily  Type 2 diabetes  HAL MARTIN   glimepiride (AMARYL) 4 MG tablet Take 8 mg by mouth daily  Type 2 diabetes HAL MARTIN   hydrochlorothiazide (MICROZIDE) 12.5 MG capsule TAKE TWO CAPSULES BY MOUTH DAILY Hypertension Gonzalez Akhtar MD   insulin lispro (HUMALOG KWIKPEN) 100 UNIT/ML (1 unit dial) KWIKPEN Inject Subcutaneous 3 times daily (before meals) According to sliding scale  Type 2 diabetes  HAL MARTIN   lisinopril (ZESTRIL) 20 MG tablet Take 1 tablet (20 mg) by mouth 2 times daily Hypertension  Gonzalez Akhtar MD   metFORMIN (GLUCOPHAGE) 1000 MG tablet Take 1 tablet (1,000 mg) by mouth 2 times daily (take with meals) Type 2 diabetes  Gonzalez Akhtar MD   metoprolol succinate ER (TOPROL XL) 50 MG 24 hr tablet Take 1 tablet (50 mg) by mouth daily Atrial Fibrillation Gonzalez Akhtar MD   rivaroxaban ANTICOAGULANT (XARELTO ANTICOAGULANT) 20 MG TABS tablet TAKE ONE  TABLET BY MOUTH ONE TIME DAILY WITH DINNER Atrial fibrillation Gonzalez Akhtar MD   rosuvastatin (CRESTOR) 20 MG tablet Take 1 tablet (20 mg) by mouth daily Coronary artery calcification Gonzalez Akhtar MD   Semaglutide, 1 MG/DOSE, 4 MG/3ML SOPN Inject 2 mg Subcutaneous once a week  Type 2 diabetes HAL MARTIN   VITAMIN D (CHOLECALCIFEROL) PO Take 1 tablet by mouth daily Dose unknown  General health  Patient Reported         Add new medications, over-the-counter drugs, herbals, vitamins, or  minerals in the blank rows below.    Medication How I take it Why I use it Prescriber                                      Allergies:      simvastatin        Side effects I have had:               Other Information:              My notes and questions:

## 2023-04-05 NOTE — LETTER
"Recommended To-Do List      Prepared on: Apr 5, 2023       You can get the best results from your medications by completing the items on this \"To-Do List.\"      Bring your To-Do List when you go to your doctor. And, share it with your family or caregivers.    My To-Do List:  What we talked about: What I should do:   A medication that is not working    Change the medication you are taking from Semaglutide (1 MG/DOSE) (OZEMPIC) to Mounjaro- discuss with Dr. Chairez or Willy.           What we talked about: What I should do:   The importance of taking your medication as intended    Education: try your best to inject the Humalog insulin 5-10 minutes before meals.                  "

## 2023-04-05 NOTE — LETTER
April 8, 2023  Te MORENO [address]  [address] [address]    Dear Eduin Yoder, [facility]     Thank you for talking with me on Apr 5, 2023 about your health and medications. As a follow-up to our conversation, I have included two documents:      1. Your Recommended To-Do List has steps you should take to get the best results from your medications.  2. Your Medication List will help you keep track of your medications and how to take them.    If you want to talk about these documents, please call Dilan Selby RPH at phone: [phone], Monday-Friday 8-4:30pm.    I look forward to working with you and your doctors to make sure your medications work well for you.    Sincerely,  Dilan Selby RPH  [facility] Pharmacist, [facility]

## 2023-06-22 DIAGNOSIS — I10 ESSENTIAL HYPERTENSION WITH GOAL BLOOD PRESSURE LESS THAN 140/90: ICD-10-CM

## 2023-06-22 RX ORDER — LISINOPRIL 20 MG/1
20 TABLET ORAL 2 TIMES DAILY
Qty: 180 TABLET | Refills: 0 | Status: SHIPPED | OUTPATIENT
Start: 2023-06-22 | End: 2023-10-26

## 2023-07-26 ENCOUNTER — LAB (OUTPATIENT)
Dept: LAB | Facility: CLINIC | Age: 73
End: 2023-07-26
Payer: COMMERCIAL

## 2023-07-26 ENCOUNTER — OFFICE VISIT (OUTPATIENT)
Dept: PHARMACY | Facility: CLINIC | Age: 73
End: 2023-07-26
Payer: COMMERCIAL

## 2023-07-26 VITALS — DIASTOLIC BLOOD PRESSURE: 70 MMHG | BODY MASS INDEX: 32.99 KG/M2 | WEIGHT: 217 LBS | SYSTOLIC BLOOD PRESSURE: 118 MMHG

## 2023-07-26 DIAGNOSIS — Z79.4 TYPE 2 DIABETES MELLITUS WITH DIABETIC NEPHROPATHY, WITH LONG-TERM CURRENT USE OF INSULIN (H): Primary | ICD-10-CM

## 2023-07-26 DIAGNOSIS — I48.91 ATRIAL FIBRILLATION, UNSPECIFIED TYPE (H): ICD-10-CM

## 2023-07-26 DIAGNOSIS — I10 PRIMARY HYPERTENSION: ICD-10-CM

## 2023-07-26 DIAGNOSIS — Z78.9 TAKES DIETARY SUPPLEMENTS: ICD-10-CM

## 2023-07-26 DIAGNOSIS — E11.21 TYPE 2 DIABETES MELLITUS WITH DIABETIC NEPHROPATHY, WITH LONG-TERM CURRENT USE OF INSULIN (H): ICD-10-CM

## 2023-07-26 DIAGNOSIS — E78.5 HYPERLIPIDEMIA LDL GOAL <100: ICD-10-CM

## 2023-07-26 DIAGNOSIS — E11.21 TYPE 2 DIABETES MELLITUS WITH DIABETIC NEPHROPATHY, WITH LONG-TERM CURRENT USE OF INSULIN (H): Primary | ICD-10-CM

## 2023-07-26 DIAGNOSIS — R52 PAIN: ICD-10-CM

## 2023-07-26 DIAGNOSIS — E67.3 HYPERVITAMINOSIS D: ICD-10-CM

## 2023-07-26 DIAGNOSIS — Z79.4 TYPE 2 DIABETES MELLITUS WITH DIABETIC NEPHROPATHY, WITH LONG-TERM CURRENT USE OF INSULIN (H): ICD-10-CM

## 2023-07-26 LAB
HBA1C MFR BLD: 7.2 % (ref 0–5.6)
VIT B12 SERPL-MCNC: 1171 PG/ML (ref 232–1245)

## 2023-07-26 PROCEDURE — 82306 VITAMIN D 25 HYDROXY: CPT

## 2023-07-26 PROCEDURE — 99607 MTMS BY PHARM ADDL 15 MIN: CPT | Performed by: PHARMACIST

## 2023-07-26 PROCEDURE — 99606 MTMS BY PHARM EST 15 MIN: CPT | Performed by: PHARMACIST

## 2023-07-26 PROCEDURE — 82607 VITAMIN B-12: CPT

## 2023-07-26 PROCEDURE — 36415 COLL VENOUS BLD VENIPUNCTURE: CPT

## 2023-07-26 PROCEDURE — 83036 HEMOGLOBIN GLYCOSYLATED A1C: CPT

## 2023-07-26 NOTE — PATIENT INSTRUCTIONS
"Recommendations from today's MTM visit:                                                    MTM (medication therapy management) is a service provided by a clinical pharmacist designed to help you get the most of out of your medicines.   Today we reviewed what your medicines are for, how to know if they are working, that your medicines are safe and how to make your medicine regimen as easy as possible.      Labs today - A1c, Vitamin D, Vitamin B12 levels    Follow-up: Return pending lab results.    It was great speaking with you today.  I value your experience and would be very thankful for your time in providing feedback in our clinic survey. In the next few days, you may receive an email or text message from [employer] with a link to a survey related to your  clinical pharmacist.\"     To schedule another MTM appointment, please call the clinic directly or you may call the MTM scheduling line at [phone] or toll-free at [phone].     My Clinical Pharmacist's contact information:                                                      Please feel free to contact me with any questions or concerns you have.      Tosin Gillette, Elvia, [facility]  Medication Therapy Management Provider  Pager: [phone]    " Weekly swingbed OT note  Problem: Occupational Therapy  Goal: Occupational Therapy Goal  Description: STG:  Pt will perform grooming with setup (progressing)  Pt will bathe with Mod A (progressing)  Pt will perform UE dressing with Mod A (progressing)  Pt will perform LE dressing with Mod A (progressing)  Pt will sit EOB x 10 min with SBA assistance (progressing)  Pt will transfer bed/chair/bsc with Min A (progressing)  Pt will perform standing task x 3 min with CGA assistance (progressing)  Pt will tolerate 5 minutes of tx without fatigue (progressing)      LT.Restore to max I with self care and mobility. (Progressing)     Outcome: Ongoing, Progressing

## 2023-07-26 NOTE — PROGRESS NOTES
Medication Therapy Management (MTM) Encounter    ASSESSMENT:                            Medication Adherence/Access: No issues identified    Type 2 Diabetes: Patient is meeting less strict A1c goal of < 8% but not stricter goal of < 7%. Unable to download CGM results today. Discussed the switch from Ozempic to Mounjaro for additional support with blood sugars and weight loss, but patient is satisfied with Ozempic results at this time. Plan to continue Ozempic and obtain A1c today. Due for eye exam, which the patient has scheduled.     Afib/Hypertension: Patient's Xarelto dose is not needed to be adjusted with the patient's current kidney function, will continue to monitor. Blood pressure at goal of <130/80 mmHg.     Hyperlipidemia: Stable. Patient is on a high intensity statin which is indicated per the 2019 ACC/AHA guidelines for lipid management.    Pain: Stable.     Supplements: May benefit from re-checking Vitamin D and Vitamin B12 levels.    PLAN:                            Labs today - A1c, Vitamin D, Vitamin B12 levels    Follow-up: Return pending lab results.    SUBJECTIVE/OBJECTIVE:                          Te oYder is a 72 year old male coming in for a follow-up visit from 4/5/2023.       Reason for visit: Routine follow-up.    Tobacco: He reports that he has never smoked. He has never used smokeless tobacco.  Alcohol: 1-3 beverages / week    Medication Adherence/Access: no issues reported    Type 2 Diabetes:    Farxiga 10mg daily  Glimepiride 8mg daily  Humalog three times daily as needed based on sliding scale - rarely needing to use  Metformin 1000mg twice daily  Ozempic 2mg weekly  Not taking aspirin due to Xarelto use  Patient is not experiencing side effects.  He continues to follow with endocrinology (outside of )  Blood sugar monitoring: Continuous Glucose Monitor Dexcom G6 - unable to download today  Current diabetes symptoms: none, rarely is alerted of hypoglycemia with Dexcom, usually occurs  overnight if it happens.  Diet/Exercise: He's pleased that he's been losing weight - is active at work this summer and appetite has been reduced with higher dose of Ozempic.  Eye exam: due - he has scheduled  Foot exam: up to date  Urine Albumin:   Lab Results   Component Value Date    UMALCR 78.26 (H) 08/15/2022      Lab Results   Component Value Date    A1C 7.2 (H) 07/26/2023       Wt Readings from Last 4 Encounters:   07/26/23 217 lb (98.4 kg)   04/05/23 232 lb 6.4 oz (105.4 kg)   03/09/23 229 lb 11.2 oz (104.2 kg)   01/16/23 230 lb (104.3 kg)      Afib/Hypertension: Patient is currently taking Xarelto 20mg daily for anticoagulation. Patient reports no current concerns of bruising or bleeding. Patient is also taking hydrochlorothiazide 25mg daily, lisinopril 20mg twice daily, metoprolol succinate 50mg daily.  Patient does not self-monitor blood pressure.  Patient reports no current medication side effects.  BP Readings from Last 3 Encounters:   07/26/23 118/70   04/05/23 113/66   03/09/23 105/66      Hyperlipidemia: Current therapy includes rosuvastatin 20mg daily.  Patient reports no significant myalgias or other side effects.  Recent Labs   Lab Test 08/15/22  1543 11/11/21  1423 09/15/21  1331 12/29/20  1020   CHOL 118 147  --  146   HDL 32*  --   --  34*   LDL 67  --   --  89   TRIG 96  --  291* 114      Pain: Current therapy includes Acetaminophen 650-1300mg as needed for general pains, occasionally takes when he's at work.  He finds this effective, denies side effects.    Supplements: Current therapy includes vitamin D3 1 tablet daily and vitamin b12 2 tablets daily. Denies side effects, is not sure of the current dosages.   Vitamin D Deficiency Screening Results:  Lab Results   Component Value Date    VITDT 66 07/26/2023    VITDT 136 (H) 12/29/2020    VITDT 69 12/28/2017    VITDT 67 12/12/2013      Lab Results   Component Value Date    B12 1,171 07/26/2023         Today's Vitals: /70   Wt 217 lb  (98.4 kg)   BMI 32.99 kg/m    ----------------    I spent 30 minutes with this patient today. All changes were made via collaborative practice agreement with Dr. Akhtar. A copy of the visit note was provided to the patient's provider(s).    A summary of these recommendations was given to the patient.    Estela Quigley, PharmD IV Student    Tosin Burres, PharmD, [facility]  Medication Therapy Management Provider  Pager: [phone]      Medication Therapy Recommendations  Diabetes mellitus, type 2 (H)    Current Medication: Semaglutide, 1 MG/DOSE, 4 MG/3ML SOPN   Rationale: More effective medication available - Ineffective medication - Effectiveness   Recommendation: Change Medication - Mounjaro 2.5 MG/0.5ML Sopn   Status: Declined per Patient         Takes dietary supplements    Current Medication: VITAMIN D (CHOLECALCIFEROL) PO   Rationale: Medication requires monitoring - Needs additional monitoring   Recommendation: Order Lab - Vitamin D, Vitamin B12 levels   Status: Accepted per CPA

## 2023-07-27 LAB — DEPRECATED CALCIDIOL+CALCIFEROL SERPL-MC: 66 UG/L (ref 20–75)

## 2023-08-01 LAB — RETINOPATHY: NORMAL

## 2023-08-20 ENCOUNTER — TRANSFERRED RECORDS (OUTPATIENT)
Dept: MULTI SPECIALTY CLINIC | Facility: CLINIC | Age: 73
End: 2023-08-20
Payer: COMMERCIAL

## 2023-09-08 ENCOUNTER — LAB (OUTPATIENT)
Dept: LAB | Facility: CLINIC | Age: 73
End: 2023-09-08
Payer: COMMERCIAL

## 2023-09-08 DIAGNOSIS — I25.10 CORONARY ARTERY CALCIFICATION: Primary | ICD-10-CM

## 2023-09-08 DIAGNOSIS — N18.2 CKD (CHRONIC KIDNEY DISEASE) STAGE 2, GFR 60-89 ML/MIN: ICD-10-CM

## 2023-09-08 DIAGNOSIS — E11.21 TYPE 2 DIABETES MELLITUS WITH DIABETIC NEPHROPATHY, WITH LONG-TERM CURRENT USE OF INSULIN (H): ICD-10-CM

## 2023-09-08 DIAGNOSIS — Z79.4 TYPE 2 DIABETES MELLITUS WITH DIABETIC NEPHROPATHY, WITH LONG-TERM CURRENT USE OF INSULIN (H): ICD-10-CM

## 2023-09-08 LAB
ANION GAP SERPL CALCULATED.3IONS-SCNC: 12 MMOL/L (ref 7–15)
BUN SERPL-MCNC: 28.8 MG/DL (ref 8–23)
CALCIUM SERPL-MCNC: 9.6 MG/DL (ref 8.8–10.2)
CHLORIDE SERPL-SCNC: 102 MMOL/L (ref 98–107)
CHOLEST SERPL-MCNC: 123 MG/DL
CREAT SERPL-MCNC: 1.32 MG/DL (ref 0.67–1.17)
CREAT UR-MCNC: 70.9 MG/DL
DEPRECATED HCO3 PLAS-SCNC: 26 MMOL/L (ref 22–29)
EGFRCR SERPLBLD CKD-EPI 2021: 57 ML/MIN/1.73M2
ERYTHROCYTE [DISTWIDTH] IN BLOOD BY AUTOMATED COUNT: 12.9 % (ref 10–15)
GLUCOSE SERPL-MCNC: 108 MG/DL (ref 70–99)
HBA1C MFR BLD: 6.8 % (ref 0–5.6)
HCT VFR BLD AUTO: 44.4 % (ref 40–53)
HDLC SERPL-MCNC: 33 MG/DL
HGB BLD-MCNC: 14.1 G/DL (ref 13.3–17.7)
LDLC SERPL CALC-MCNC: 67 MG/DL
MCH RBC QN AUTO: 30.1 PG (ref 26.5–33)
MCHC RBC AUTO-ENTMCNC: 31.8 G/DL (ref 31.5–36.5)
MCV RBC AUTO: 95 FL (ref 78–100)
MICROALBUMIN UR-MCNC: 19.2 MG/L
MICROALBUMIN/CREAT UR: 27.08 MG/G CR (ref 0–17)
NONHDLC SERPL-MCNC: 90 MG/DL
PLATELET # BLD AUTO: 140 10E3/UL (ref 150–450)
POTASSIUM SERPL-SCNC: 4 MMOL/L (ref 3.4–5.3)
RBC # BLD AUTO: 4.69 10E6/UL (ref 4.4–5.9)
SODIUM SERPL-SCNC: 140 MMOL/L (ref 136–145)
TRIGL SERPL-MCNC: 113 MG/DL
WBC # BLD AUTO: 6.6 10E3/UL (ref 4–11)

## 2023-09-08 PROCEDURE — 82043 UR ALBUMIN QUANTITATIVE: CPT

## 2023-09-08 PROCEDURE — 36415 COLL VENOUS BLD VENIPUNCTURE: CPT

## 2023-09-08 PROCEDURE — 83036 HEMOGLOBIN GLYCOSYLATED A1C: CPT

## 2023-09-08 PROCEDURE — 80061 LIPID PANEL: CPT

## 2023-09-08 PROCEDURE — 82570 ASSAY OF URINE CREATININE: CPT

## 2023-09-08 PROCEDURE — 85027 COMPLETE CBC AUTOMATED: CPT

## 2023-09-08 PROCEDURE — 80048 BASIC METABOLIC PNL TOTAL CA: CPT

## 2023-09-08 NOTE — RESULT ENCOUNTER NOTE
The following letter pertains to your most recent diagnostic tests:    Good news! The labs look stable and at goal.  We can discuss them in further detail when we meet next week.         Sincerely,    Dr. Akhtar

## 2023-09-11 ENCOUNTER — OFFICE VISIT (OUTPATIENT)
Dept: FAMILY MEDICINE | Facility: CLINIC | Age: 73
End: 2023-09-11
Payer: COMMERCIAL

## 2023-09-11 VITALS
OXYGEN SATURATION: 96 % | DIASTOLIC BLOOD PRESSURE: 62 MMHG | BODY MASS INDEX: 32.63 KG/M2 | SYSTOLIC BLOOD PRESSURE: 101 MMHG | HEART RATE: 82 BPM | RESPIRATION RATE: 18 BRPM | WEIGHT: 214.6 LBS | TEMPERATURE: 97.3 F

## 2023-09-11 DIAGNOSIS — E66.09 CLASS 1 OBESITY DUE TO EXCESS CALORIES WITH SERIOUS COMORBIDITY AND BODY MASS INDEX (BMI) OF 32.0 TO 32.9 IN ADULT: ICD-10-CM

## 2023-09-11 DIAGNOSIS — G47.33 OSA (OBSTRUCTIVE SLEEP APNEA): ICD-10-CM

## 2023-09-11 DIAGNOSIS — G62.9 PERIPHERAL POLYNEUROPATHY: ICD-10-CM

## 2023-09-11 DIAGNOSIS — I10 PRIMARY HYPERTENSION: ICD-10-CM

## 2023-09-11 DIAGNOSIS — E78.5 HYPERLIPIDEMIA LDL GOAL <100: ICD-10-CM

## 2023-09-11 DIAGNOSIS — E11.9 TYPE 2 DIABETES MELLITUS WITHOUT COMPLICATION, WITHOUT LONG-TERM CURRENT USE OF INSULIN (H): Primary | ICD-10-CM

## 2023-09-11 DIAGNOSIS — I73.9 CLAUDICATION (H): ICD-10-CM

## 2023-09-11 DIAGNOSIS — E66.811 CLASS 1 OBESITY DUE TO EXCESS CALORIES WITH SERIOUS COMORBIDITY AND BODY MASS INDEX (BMI) OF 32.0 TO 32.9 IN ADULT: ICD-10-CM

## 2023-09-11 DIAGNOSIS — N18.2 CKD (CHRONIC KIDNEY DISEASE) STAGE 2, GFR 60-89 ML/MIN: ICD-10-CM

## 2023-09-11 PROCEDURE — 90662 IIV NO PRSV INCREASED AG IM: CPT | Performed by: INTERNAL MEDICINE

## 2023-09-11 PROCEDURE — 99214 OFFICE O/P EST MOD 30 MIN: CPT | Mod: 25 | Performed by: INTERNAL MEDICINE

## 2023-09-11 PROCEDURE — G0008 ADMIN INFLUENZA VIRUS VAC: HCPCS | Performed by: INTERNAL MEDICINE

## 2023-09-11 ASSESSMENT — PAIN SCALES - GENERAL: PAINLEVEL: NO PAIN (0)

## 2023-09-11 NOTE — PROGRESS NOTES
"  Assessment & Plan     Type 2 diabetes mellitus without complication, without long-term current use of insulin (H)  Well controlled; continue current regimen; recheck in 6 months     Class 1 obesity due to excess calories with serious comorbidity and body mass index (BMI) of 32.0 to 32.9 in adult  Counseled on diet and exercise interventions to promote weight loss     Primary hypertension  Well controlled     Hyperlipidemia LDL goal <70  On statin therapy; LDL at goal < 70    CKD (chronic kidney disease) stage 2, GFR 60-89 ml/min  Stable     ROC (obstructive sleep apnea)      Peripheral polyneuropathy  Stable     Claudication (H)  Check JAVIER again ; if negative consider vein work up   - US JAVIER Doppler with Exercise Bilateral; Future      No LOS data to display   Time spent by me doing chart review, history and exam, documentation and further activities per the note       BMI:   Estimated body mass index is 32.63 kg/m  as calculated from the following:    Height as of 3/9/23: 1.727 m (5' 8\").    Weight as of this encounter: 97.3 kg (214 lb 9.6 oz).   Weight management plan: Discussed healthy diet and exercise guidelines        Gonzalez Akhtar MD  [facility] Tiwari is a 72 year old, presenting for the following health issues:  Follow Up (Patient is here for a 6 month follow up visit with PCP.)      History of Present Illness       Diabetes:   He presents for follow up of diabetes.   He is checking home blood glucose with a continuous glucose monitor.   He checks blood glucose before meals, after meals, before and after meals and at bedtime.  Blood glucose is sometimes over 200 and sometimes under 70. He is aware of hypoglycemia symptoms including other.    He has no concerns regarding his diabetes at this time.  He is having numbness in feet and weight loss.  The patient has had a diabetic eye exam in the last 12 months. Eye exam performed on 8/ 2023. Location of last eye [facility]        Hyperlipidemia:  He presents for follow up of hyperlipidemia.   He is taking medication to lower cholesterol. He is not having myalgia or other side effects to statin medications.    Hypertension: He presents for follow up of hypertension.  He does not check blood pressure  regularly outside of the clinic. Outpatient blood pressures have not been over 140/90. He does not follow a low salt diet.     He eats 0-1 servings of fruits and vegetables daily.He consumes 0 sweetened beverage(s) daily.He exercises with enough effort to increase his heart rate 60 or more minutes per day.  He exercises with enough effort to increase his heart rate 5 days per week.   He is taking medications regularly.         Calf pain associated with walking on his job   Bilateral   Improves with rest  Present for months  Negative JAVIER in 2017       Review of Systems         Objective    /62 (BP Location: Right arm, Patient Position: Sitting, Cuff Size: Adult Large)   Pulse 82   Temp 97.3  F (36.3  C) (Temporal)   Resp 18   Wt 97.3 kg (214 lb 9.6 oz)   SpO2 96%   BMI 32.63 kg/m    Body mass index is 32.63 kg/m .  Physical Exam   GENERAL: healthy, alert and no distress  RESP: lungs clear to auscultation - no rales, rhonchi or wheezes  CV: Heart with regular rate and rhythm.   ABDOMEN: soft, nontender, no hepatosplenomegaly, no masses and bowel sounds normal  MS: no gross musculoskeletal defects noted, no edema  SKIN:  Chronic venous stasis skin changes bilateral legs   NEURO: Normal strength and tone, mentation intact and speech normal  PSYCH: mentation appears normal, affect normal/bright  Diabetic foot exam: normal DP and PT pulses, no trophic changes or ulcerative lesions, and normal sensory exam

## 2023-09-19 ENCOUNTER — HOSPITAL ENCOUNTER (OUTPATIENT)
Dept: ULTRASOUND IMAGING | Facility: CLINIC | Age: 73
Discharge: HOME | End: 2023-09-19
Attending: INTERNAL MEDICINE | Admitting: INTERNAL MEDICINE
Payer: COMMERCIAL

## 2023-09-19 DIAGNOSIS — I73.9 CLAUDICATION (H): ICD-10-CM

## 2023-09-19 PROCEDURE — 93924 LWR XTR VASC STDY BILAT: CPT

## 2023-09-20 ENCOUNTER — TELEPHONE (OUTPATIENT)
Dept: OTHER | Facility: CLINIC | Age: 73
End: 2023-09-20
Payer: COMMERCIAL

## 2023-09-20 NOTE — RESULT ENCOUNTER NOTE
The following letter pertains to your most recent diagnostic tests:    Overall, the blood flow test results suggest good blood flow to your left leg and good blood flow to your right leg at rest, but the flow to your right leg with activity could not be determined.   As such, I think you may want to consult with a vascular surgeon to see if your leg symptoms are blood flow related and to see if they recommend any additional testing of your artery blood flow or other test to investigate your vein blood returning capacity to see if they think they can help your leg symptoms.  I recommend Dr. Lagos or Dr. Gavin.  I sent their clinic a referral and their schedulers should call you to schedule a consultation appointment.          Sincerely,    Dr. Akhtar

## 2023-09-20 NOTE — TELEPHONE ENCOUNTER
Pt referred to [facility] by Dr. Akhtar for PAD.    Pt needs to be scheduled for NEW PAD consult with Vascular Medicine.  Will route to scheduling to coordinate an appointment at next available.    Appt note: Ref by Dr. Akhtar for PAD; JAVIER in Epic.    Megan Ojeda, KHADIJAHN, RN-[facility] [place]

## 2023-09-20 NOTE — TELEPHONE ENCOUNTER
Patient returned call, inquiring about the type of appointment he would be scheduled for, informed patient of next available appointments. Patient states he will call [facility] back to schedule once he looks at his work schedule the week of October 9th.

## 2023-10-24 ENCOUNTER — TRANSFERRED RECORDS (OUTPATIENT)
Dept: HEALTH INFORMATION MANAGEMENT | Facility: CLINIC | Age: 73
End: 2023-10-24
Payer: COMMERCIAL

## 2023-10-26 DIAGNOSIS — I10 ESSENTIAL HYPERTENSION: ICD-10-CM

## 2023-10-26 DIAGNOSIS — I10 ESSENTIAL HYPERTENSION WITH GOAL BLOOD PRESSURE LESS THAN 140/90: ICD-10-CM

## 2023-10-26 RX ORDER — LISINOPRIL 20 MG/1
20 TABLET ORAL 2 TIMES DAILY
Qty: 180 TABLET | Refills: 0 | Status: SHIPPED | OUTPATIENT
Start: 2023-10-26 | End: 2024-01-08

## 2023-10-26 RX ORDER — HYDROCHLOROTHIAZIDE 12.5 MG/1
CAPSULE ORAL
Qty: 180 CAPSULE | Refills: 2 | Status: SHIPPED | OUTPATIENT
Start: 2023-10-26 | End: 2024-09-16

## 2023-10-27 ENCOUNTER — OFFICE VISIT (OUTPATIENT)
Dept: OTHER | Facility: CLINIC | Age: 73
End: 2023-10-27
Attending: INTERNAL MEDICINE
Payer: COMMERCIAL

## 2023-10-27 VITALS
HEIGHT: 66 IN | SYSTOLIC BLOOD PRESSURE: 113 MMHG | BODY MASS INDEX: 34.2 KG/M2 | HEART RATE: 89 BPM | WEIGHT: 212.8 LBS | OXYGEN SATURATION: 99 % | DIASTOLIC BLOOD PRESSURE: 72 MMHG

## 2023-10-27 DIAGNOSIS — I89.0 LYMPHEDEMA DUE TO VENOUS INSUFFICIENCY: ICD-10-CM

## 2023-10-27 DIAGNOSIS — I83.893 VARICOSE VEINS OF BILATERAL LOWER EXTREMITIES WITH OTHER COMPLICATIONS: Primary | ICD-10-CM

## 2023-10-27 DIAGNOSIS — I87.2 LYMPHEDEMA DUE TO VENOUS INSUFFICIENCY: ICD-10-CM

## 2023-10-27 DIAGNOSIS — I48.20 CHRONIC ATRIAL FIBRILLATION (H): ICD-10-CM

## 2023-10-27 DIAGNOSIS — E11.42 TYPE 2 DIABETES MELLITUS WITH DIABETIC POLYNEUROPATHY, WITH LONG-TERM CURRENT USE OF INSULIN (H): ICD-10-CM

## 2023-10-27 DIAGNOSIS — G47.33 OSA (OBSTRUCTIVE SLEEP APNEA): ICD-10-CM

## 2023-10-27 DIAGNOSIS — I87.303 VENOUS HYPERTENSION OF BOTH LOWER EXTREMITIES: ICD-10-CM

## 2023-10-27 DIAGNOSIS — E11.9 TYPE 2 DIABETES MELLITUS WITHOUT COMPLICATION, WITH LONG-TERM CURRENT USE OF INSULIN (H): ICD-10-CM

## 2023-10-27 DIAGNOSIS — E66.811 CLASS 1 OBESITY WITH SERIOUS COMORBIDITY AND BODY MASS INDEX (BMI) OF 34.0 TO 34.9 IN ADULT, UNSPECIFIED OBESITY TYPE: ICD-10-CM

## 2023-10-27 DIAGNOSIS — I87.2 VENOUS STASIS DERMATITIS OF BOTH LOWER EXTREMITIES: ICD-10-CM

## 2023-10-27 DIAGNOSIS — Z79.4 TYPE 2 DIABETES MELLITUS WITHOUT COMPLICATION, WITH LONG-TERM CURRENT USE OF INSULIN (H): ICD-10-CM

## 2023-10-27 DIAGNOSIS — I25.10 CORONARY ARTERY CALCIFICATION: ICD-10-CM

## 2023-10-27 DIAGNOSIS — Z79.4 TYPE 2 DIABETES MELLITUS WITH DIABETIC POLYNEUROPATHY, WITH LONG-TERM CURRENT USE OF INSULIN (H): ICD-10-CM

## 2023-10-27 PROCEDURE — 99205 OFFICE O/P NEW HI 60 MIN: CPT | Performed by: INTERNAL MEDICINE

## 2023-10-27 PROCEDURE — G0463 HOSPITAL OUTPT CLINIC VISIT: HCPCS | Performed by: INTERNAL MEDICINE

## 2023-10-27 RX ORDER — TRIAMCINOLONE ACETONIDE 1 MG/G
CREAM TOPICAL 2 TIMES DAILY
Qty: 80 G | Refills: 1 | Status: SHIPPED | OUTPATIENT
Start: 2023-10-27 | End: 2024-02-27

## 2023-10-27 NOTE — PROGRESS NOTES
"Patient is here to discuss consult    /75 (BP Location: Right arm, Patient Position: Chair, Cuff Size: Adult Large)   Pulse 89   Ht 5' 6\" (1.676 m)   Wt 212 lb 12.8 oz (96.5 kg)   SpO2 99%   BMI 34.35 kg/m      Questions patient would like addressed today are: N/A.    Refills are needed: No    Has homecare services and agency name:  Melina DILL    "

## 2023-10-27 NOTE — PROGRESS NOTES
[facility] INITIAL VASCULAR MEDICINE CONSULT    ( New patient visit)    PRIMARY HEALTH CARE PROVIDER:  Gonzalez Akhtar MD      REFERRING HEALTH CARE PROVIDER;  Gonzalez Akhtar MD    REASON FOR CONSULT: Evaluation and management of vascular causes of discoloration of bilateral lower extremities and recent mildly abnormal JAVIER      HPI: Te Yoder is a 73 year old year old very pleasant male with multiple medical issues including the chronic atrial fibrillation currently on anticoagulation, nephrolithiasis, CKD, hypertension, hyperlipidemia, morbid obesity BMI greater than 34, type 2 diabetes mellitus gives a history of bilateral lower extremity discoloration for many years and also mild swelling.  He denies any intermittent claudication, rest pain or foot ulcers or leg ulcers.  No previous history of DVT or PE.  He underwent JAVIER recently with results as delineated below noncompressible vessels but improved with exercise and he has a Bi/triphasic waveforms    He is new to this clinic reviewed available extensive records in the epic and updated chart      PAST MEDICAL HISTORY  Past Medical History:   Diagnosis Date    Afferent pupillary defect     Anemia     Atrial fibrillation (H) 12/10/14    EKG done at PMD 12/10/14     B12 deficiency     Calculus of kidney     PSA followed by urology    CKD (chronic kidney disease), stage II     Exotropia     HTN (hypertension)     Hyperlipidaemia     Hyperlipidemia     Microalbuminuria     Morbid obesity (H)     Nonspecific abnormal unspecified cardiovascular function study     Optic atrophy, left eye     LE, mild    Optic disc pallor     LE    ROC (obstructive sleep apnea)     ROC on CPAP     Palpitations     Phrenic neuropathy     Type II or unspecified type diabetes mellitus without mention of complication, not stated as uncontrolled     retinopathy and nephropathy - followed by Dr. Arnold       CURRENT MEDICATIONS  acetaminophen (TYLENOL) 650  MG CR tablet, Take 1,300 mg by mouth every 8 hours as needed for mild pain or fever  ASPIRIN NOT PRESCRIBED, INTENTIONAL,, Reported on 3/8/2017  Continuous Blood Gluc Sensor (DEXCOM G6 SENSOR) MISC, Change every 10 days.  Continuous Blood Gluc Transmit (DEXCOM G6 TRANSMITTER) MISC,   Cyanocobalamin (VITAMIN B-12 SL), Take 2 tablets by mouth daily Dose unknown  dapagliflozin (FARXIGA) 10 MG TABS tablet, Take 10 mg by mouth daily  glimepiride (AMARYL) 4 MG tablet, Take 8 mg by mouth daily  hydrochlorothiazide (MICROZIDE) 12.5 MG capsule, TAKE TWO CAPSULES BY MOUTH DAILY  insulin lispro (HUMALOG KWIKPEN) 100 UNIT/ML (1 unit dial) KWIKPEN, Inject Subcutaneous 3 times daily (before meals) According to sliding scale  lisinopril (ZESTRIL) 20 MG tablet, Take 1 tablet (20 mg) by mouth 2 times daily  metFORMIN (GLUCOPHAGE) 1000 MG tablet, Take 1 tablet (1,000 mg) by mouth 2 times daily (take with meals)  metoprolol succinate ER (TOPROL XL) 50 MG 24 hr tablet, Take 1 tablet (50 mg) by mouth daily  rivaroxaban ANTICOAGULANT (XARELTO ANTICOAGULANT) 20 MG TABS tablet, TAKE ONE TABLET BY MOUTH ONE TIME DAILY WITH DINNER  rosuvastatin (CRESTOR) 20 MG tablet, Take 1 tablet (20 mg) by mouth daily  Semaglutide, 1 MG/DOSE, 4 MG/3ML SOPN, Inject 2 mg Subcutaneous once a week  ULTICARE 29G X 12.7MM insulin pen needle, USE ONCE DAILY  VITAMIN D (CHOLECALCIFEROL) PO, Take 1 tablet by mouth daily Dose unknown    No current facility-administered medications on file prior to visit.      PAST SURGICAL HISTORY:  Past Surgical History:   Procedure Laterality Date    COLONOSCOPY  2003    COLONOSCOPY  11/25/2013    Procedure: COLONOSCOPY;  COLONOSCOPY;  Surgeon: Uday Taylor MD;  Location:  GI       ALLERGIES     Allergies   Allergen Reactions    Simvastatin Other (See Comments)     Muscle aches, elevated CK levels       FAMILY HISTORY  Family History   Problem Relation Age of Onset    Heart Disease Father         disease    Arthritis  Mother     Cancer - colorectal No family hx of        VASCULAR FAMILY HISTORY  1st order relative with atherosclerotic PAD: No  1st order relative with AAA: No  Family history of Familial Hyperlipidemia No  Family History of Hypercoagulable state:No    VASCULAR RISK FACTORS  1. Diabetes:Yes controlled  2. Smoking: has never smoked.  3. HTN: controlled  4.Hyperlipidemia: Yes - controlled      SOCIAL HISTORY  Social History     Socioeconomic History    Marital status: Single     Spouse name: Not on file    Number of children: 0    Years of education: BA    Highest education level: Not on file   Occupational History    Occupation: retired office   Tobacco Use    Smoking status: Never    Smokeless tobacco: Never   Substance and Sexual Activity    Alcohol use: Yes     Alcohol/week: 0.0 standard drinks of alcohol     Comment: 4 drinks per week    Drug use: No    Sexual activity: Yes     Partners: Female   Other Topics Concern    Parent/sibling w/ CABG, MI or angioplasty before 65F 55M? Not Asked   Social History Narrative    Not on file     Social Determinants of Health     Financial Resource Strain: Not on file   Food Insecurity: Not on file   Transportation Needs: Not on file   Physical Activity: Not on file   Stress: Not on file   Social Connections: Not on file   Interpersonal Safety: Not on file   Housing Stability: Not on file       ROS:   General: No change in weight, sleep or appetite.  Normal energy.  No fever or chills  Eyes: Negative for vision changes or eye problems  ENT: No problems with ears, nose or throat.  No difficulty swallowing.  Resp: No coughing, wheezing or shortness of breath  CV: No chest pains or palpitations  GI: No nausea, vomiting,  heartburn, abdominal pain, diarrhea, constipation or change in bowel habits  : No urinary frequency or dysuria, bladder or kidney problems  Musculoskeletal: No significant muscle or joint pains  Neurologic: No headaches, numbness, tingling, weakness, problems  "with balance or coordination  Psychiatric: No problems with anxiety, depression or mental health  Heme/immune/allergy: No history of bleeding or clotting problems or anemia.  No allergies or immune system problems  Endocrine: No history of thyroid disease, diabetes or other endocrine disorders  Skin: No rashes,worrisome lesions or skin problems  Vascular:  Vascular:  Discoloration of both legs for many years  No claudication symptoms  No foot ulcers or leg ulcers  No history of a DVT or PE    EXAM:  /72 (BP Location: Left arm, Patient Position: Chair, Cuff Size: Adult Regular)   Pulse 89   Ht 5' 6\" (1.676 m)   Wt 212 lb 12.8 oz (96.5 kg)   SpO2 99%   BMI 34.35 kg/m    In general, the patient is a pleasant male in no apparent distress.    HEENT: NC/AT.  PERRLA.  EOMI.  Sclerae white, not injected.  Nares clear.  Pharynx without erythema or exudate.  Dentition intact.    Neck: No adenopathy.  No thyromegaly. Carotids +2/2 bilaterally without bruits.  No jugular venous distension.   Heart: RRR. Normal S1, S2 splits physiologically. No murmur, rub, click, or gallop. The PMI is in the 5th ICS in the midclavicular line. There is no heave.    Lungs: CTA.  No ronchi, wheezes, rales.  No dullness to percussion.   Abdomen: Soft, nontender, nondistended. No organomegaly. No AAA.  No bruits.   Extremities: Vascular:  Bilateral lower extremity leg edema consistent with secondary lymphedema due to venous insufficiency  Venous stasis dermatitis in both lower extremities  Good palpable and dopplerable peripheral pulses  Bilateral lower extremity varicose veins CEAP CVI        Labs:  LIPID RESULTS:  Lab Results   Component Value Date    CHOL 123 09/08/2023    CHOL 146 12/29/2020    HDL 33 (L) 09/08/2023    HDL 34 (L) 12/29/2020    LDL 67 09/08/2023    LDL 89 12/29/2020    TRIG 113 09/08/2023    TRIG 291 (A) 09/15/2021    TRIG 114 12/29/2020    CHOLHDLRATIO 3.8 01/30/2015       LIVER ENZYME RESULTS:  Lab Results "   Component Value Date    AST 12 11/11/2021    AST 20 06/01/2021    ALT 23 11/11/2021    ALT 27 06/01/2021       CBC RESULTS:  Lab Results   Component Value Date    WBC 6.6 09/08/2023    WBC 7.7 06/01/2021    RBC 4.69 09/08/2023    RBC 4.86 06/01/2021    HGB 14.1 09/08/2023    HGB 15.1 06/01/2021    HCT 44.4 09/08/2023    HCT 45.7 06/01/2021    MCV 95 09/08/2023    MCV 94 06/01/2021    MCH 30.1 09/08/2023    MCH 31.1 06/01/2021    MCHC 31.8 09/08/2023    MCHC 33.0 06/01/2021    RDW 12.9 09/08/2023    RDW 14.0 06/01/2021     (L) 09/08/2023     06/01/2021       BMP RESULTS:  Lab Results   Component Value Date     09/08/2023     06/01/2021    POTASSIUM 4.0 09/08/2023    POTASSIUM 4.0 08/15/2022    POTASSIUM 3.9 06/01/2021    CHLORIDE 102 09/08/2023    CHLORIDE 105 08/15/2022    CHLORIDE 105 06/01/2021    CO2 26 09/08/2023    CO2 27 08/15/2022    CO2 28 06/01/2021    ANIONGAP 12 09/08/2023    ANIONGAP 6 08/15/2022    ANIONGAP 6 06/01/2021     (H) 09/08/2023     (H) 08/15/2022     (H) 06/01/2021    BUN 28.8 (H) 09/08/2023    BUN 24 08/15/2022    BUN 23 06/01/2021    CR 1.32 (H) 09/08/2023    CR 1.19 06/01/2021    GFRESTIMATED 57 (L) 09/08/2023    GFRESTIMATED 61 06/01/2021    GFRESTBLACK 71 06/01/2021    LEIGH 9.6 09/08/2023    LEIGH 9.2 06/01/2021        A1C RESULTS:  Lab Results   Component Value Date    A1C 6.8 (H) 09/08/2023    A1C 8.6 (H) 06/01/2021       THYROID RESULTS:  Lab Results   Component Value Date    TSH 3.28 06/01/2021       Procedures:     ULTRASOUND ANKLE-BRACHIAL INDEX DOPPLER WITH EXERCISE BILATERAL    9/19/2023 2:51 PM      HISTORY: Claudication (H).     COMPARISON: 3/8/2017     FINDINGS:  Right JAVIER:   PT: 1.17.  DP: 1.24.     Left JAVIER:   PT: 1.13.  DP: 1.12.      Waveforms: Multiphasic bilaterally.     Exercise: The patient was exercised on a treadmill at 1.5 mph at a 10%  incline for 5 minutes total. Minor discomfort at the left ankle that  patient stated  was from his shoe.     Right exercise JAVIER: Noncompressible.  Left exercise JAVIER: 1.38.                                                                      IMPRESSION:   1. Right resting JAVIER is normal without evidence of arterial  insufficiency, and post exercise JAVIER cannot be calculated secondary to  noncompressible vessels.  2. Left resting and exercise ABIs are normal without evidence of  arterial insufficiency.     JAVIER CRITERIA:  >1.4 NC  0.95-1.4 Normal  0.90 - 0.94 Mild  0.5 - 0.89 Moderate  0.2 - 0.49 Severe  <0.2 Critical     HAYDER GARCIA DO             Assessment and Plan:   1. Varicose veins of bilateral lower extremities with other complications  - US Venous Competency Bilateral; Future  - Compression Sleeve/Stocking Order for DME - ONLY FOR DME    2. Venous stasis dermatitis of both lower extremities  - triamcinolone (KENALOG) 0.1 % external cream; Apply topically 2 times daily To the leg area  Dispense: 80 g; Refill: 1    3. Venous hypertension of both lower extremities    4. Lymphedema due to venous insufficiency  - Lymphedema Therapy Referral; Future  - Compression Sleeve/Stocking Order for DME - ONLY FOR DME    5. Class 1 obesity with serious comorbidity and body mass index (BMI) of 34.0 to 34.9 in adult, unspecified obesity type    6. ROC (obstructive sleep apnea)    7. Type 2 diabetes mellitus with diabetic polyneuropathy, with long-term current use of insulin (H)    8. Chronic atrial fibrillation (H)    This is a very pleasant 73-year-old male with multiple medical issues seen for evaluation and management of vascular causes of discoloration of the legs and abnormal JAVIER.  Reviewed his recent JAVIER as delineated above and no clinical evidence of severe arterial insufficiency.  He has no rest pain, nocturnal pain or foot ulcers or leg ulcers.  His main issue is chronic venous insufficiency with venous hypertension and stasis dermatitis and he is obese.  No recent venous competency studies and no  previous venous ablation     I had a lengthy discussion with the patient and I have given education sheet on varicose veins/vein disorders    At present my recommendations,  Arrange bilateral lower extremity venous competency studies  Compression stockings 20 to 30 mmHg knee-high during the daytime and elevate the legs  Arrange referral to see edema therapist for compression, local care, possible Unna boot, edema wear or wraps etc.  Treat with triamcinolone 0.1% cream apply to the legs twice a day  Suggested vein formula 1000 and take 1 pill twice a day for first month then followed by 1 pill a day.  Information and prescription given  Lose weight  Elevate the legs  Follow-up in 3 to 4 months in the office      60 minutes spent on the date of the encounter doing chart review, history and exam, documentation, and further activities as noted above.  He is new to this clinic reviewed available records in the epic and updated chart.  AVS with written instructions given    Thank you for the consultation  Copy of this note to primary care physician  This note was dictated by utilizing Dragon software    Pablo Enrique MD,CONTRERAS,FSVM,FNLA, FACP  Vascular Medicine  Clinical Hypertension Specialist   Clinical Lipidologist

## 2023-10-27 NOTE — PATIENT INSTRUCTIONS
Please see edema therapist for compression, unna boot , wraps etc referral done     Lose weight elevate legs     Use triamcinolone 0.1% cream to leg area twice a day , New Rx sent     Go for venous competency ultrasound study , our staff will schedule     Take Vein formula 1000 one pill twice a day for a month then decrease to one pill a day     Follow up in 3-4 months in my office

## 2023-10-30 RX ORDER — ROSUVASTATIN CALCIUM 20 MG/1
20 TABLET, COATED ORAL DAILY
Qty: 90 TABLET | Refills: 3 | Status: SHIPPED | OUTPATIENT
Start: 2023-10-30

## 2023-10-30 NOTE — TELEPHONE ENCOUNTER
[facility] pharmacist called requesting Rosuvastatin refill. Pt is out of medication.   Routing refill request to provider for review/approval because:  Drug allergy warning

## 2023-11-08 ENCOUNTER — ANCILLARY PROCEDURE (OUTPATIENT)
Dept: VASCULAR ULTRASOUND | Facility: CLINIC | Age: 73
End: 2023-11-08
Attending: INTERNAL MEDICINE
Payer: COMMERCIAL

## 2023-11-08 DIAGNOSIS — I83.893 VARICOSE VEINS OF BILATERAL LOWER EXTREMITIES WITH OTHER COMPLICATIONS: ICD-10-CM

## 2023-11-08 PROCEDURE — 93970 EXTREMITY STUDY: CPT

## 2023-11-08 PROCEDURE — 93970 EXTREMITY STUDY: CPT | Mod: 26 | Performed by: SURGERY

## 2023-11-12 NOTE — RESULT ENCOUNTER NOTE
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

## 2023-12-28 DIAGNOSIS — I48.91 ATRIAL FIBRILLATION, UNSPECIFIED TYPE (H): ICD-10-CM

## 2024-01-02 ENCOUNTER — TRANSFERRED RECORDS (OUTPATIENT)
Dept: MULTI SPECIALTY CLINIC | Facility: CLINIC | Age: 74
End: 2024-01-02

## 2024-01-02 LAB — RETINOPATHY: NORMAL

## 2024-01-07 DIAGNOSIS — I10 ESSENTIAL HYPERTENSION WITH GOAL BLOOD PRESSURE LESS THAN 140/90: ICD-10-CM

## 2024-01-08 RX ORDER — LISINOPRIL 20 MG/1
20 TABLET ORAL 2 TIMES DAILY
Qty: 180 TABLET | Refills: 0 | Status: SHIPPED | OUTPATIENT
Start: 2024-01-08 | End: 2024-07-18

## 2024-01-16 ENCOUNTER — OFFICE VISIT (OUTPATIENT)
Dept: UROLOGY | Facility: CLINIC | Age: 74
End: 2024-01-16
Payer: COMMERCIAL

## 2024-01-16 VITALS
HEART RATE: 83 BPM | BODY MASS INDEX: 33.34 KG/M2 | OXYGEN SATURATION: 97 % | WEIGHT: 220 LBS | DIASTOLIC BLOOD PRESSURE: 73 MMHG | SYSTOLIC BLOOD PRESSURE: 112 MMHG | HEIGHT: 68 IN

## 2024-01-16 DIAGNOSIS — N40.0 BENIGN PROSTATIC HYPERPLASIA WITHOUT LOWER URINARY TRACT SYMPTOMS: ICD-10-CM

## 2024-01-16 DIAGNOSIS — N40.0 BPH (BENIGN PROSTATIC HYPERPLASIA): Primary | ICD-10-CM

## 2024-01-16 DIAGNOSIS — Z12.5 PROSTATE CANCER SCREENING: Primary | ICD-10-CM

## 2024-01-16 LAB
ALBUMIN UR-MCNC: NEGATIVE MG/DL
APPEARANCE UR: CLEAR
BILIRUB UR QL STRIP: NEGATIVE
COLOR UR AUTO: YELLOW
GLUCOSE UR STRIP-MCNC: >=1000 MG/DL
HGB UR QL STRIP: ABNORMAL
KETONES UR STRIP-MCNC: NEGATIVE MG/DL
LEUKOCYTE ESTERASE UR QL STRIP: NEGATIVE
NITRATE UR QL: NEGATIVE
PH UR STRIP: 5.5 [PH] (ref 5–7)
PSA SERPL-MCNC: 1.7 UG/L (ref 0–4)
SP GR UR STRIP: 1.02 (ref 1–1.03)
UROBILINOGEN UR STRIP-ACNC: 2 E.U./DL

## 2024-01-16 PROCEDURE — 84153 ASSAY OF PSA TOTAL: CPT | Performed by: UROLOGY

## 2024-01-16 PROCEDURE — 36415 COLL VENOUS BLD VENIPUNCTURE: CPT | Performed by: UROLOGY

## 2024-01-16 PROCEDURE — 81003 URINALYSIS AUTO W/O SCOPE: CPT | Mod: QW | Performed by: UROLOGY

## 2024-01-16 PROCEDURE — 99213 OFFICE O/P EST LOW 20 MIN: CPT | Performed by: UROLOGY

## 2024-01-16 ASSESSMENT — PAIN SCALES - GENERAL: PAINLEVEL: NO PAIN (0)

## 2024-01-16 NOTE — LETTER
1/16/2024       RE: Te Yoder  [address]  [facility] [address]     Dear Colleague,    Thank you for referring your patient, Te Yoder, to the [facility] at [facility]. Please see a copy of my visit note below.    Assessment & Plan  ASSESSMENT and PLAN  73 year old male here for reevaluation of prostate cancer screening and nephrolithiasis.    1.  Elevated PSA  - PSA tumor marker [[id number]]  - UA without Microscopic [[id number]]; Future  - UA without Microscopic [[id number]]  2. Prostate cancer screening  - PSA, screen; Future  3.  Nephrolithiasis    Patient's PSA at is almost doubled from last year.  Given normal exam, will recheck his PSA in 1 month.  If remains elevated will perform prostate MRI to make sure no sign of cancer.    No further follow-up of nephrolithiasis unless symptomatic.    Plan:  -PSA and visit 1 month    Time spent: 20 minutes spent on the date of the encounter doing chart review, history and exam, documentation and further activities as noted above.    Osiel Taylor MD   Urology  [facility]         Subjective    CHIEF COMPLAINT   follow-up of prostate cancer screening and nephrolithiasis.      HPI   Te Yoder is a very pleasant 73 year old male who presents with a history of prostate cancer screening and nephrolithiasis..    Since last visit, no flank pain, hematuria or concern for stone passage.  No new lower urinary tract symptoms.  No recent sexual activity, saddle activities, or urinary tract infection.    Labs:   Latest Reference Range & Units 11/11/21 14:23 01/16/23 12:54 01/16/24 10:07   PSA 0.00 - 4.00 ug/L 1.24 0.95 1.70      Latest Reference Range & Units 01/16/24 10:47   Color Urine Colorless, Straw, Light Yellow, Yellow  Yellow   Appearance Urine Clear  Clear   Glucose Urine Negative mg/dL >=1000 !   Bilirubin Urine Negative  Negative   Ketones Urine Negative mg/dL  "Negative   Specific Gravity Urine 1.003 - 1.035  1.020   pH Urine 5.0 - 7.0  5.5   Protein Albumin Urine Negative mg/dL Negative   Urobilinogen Urine 0.2, 1.0 E.U./dL 2.0 !   Nitrite Urine Negative  Negative   Blood Urine Negative  Trace !   Leukocyte Esterase Urine Negative  Negative   !: Data is abnormal     Objective    PHYSICAL EXAM  Patient is a 73 year old  male   Vitals: Blood pressure 112/73, pulse 83, height 1.727 m (5' 8\"), weight 99.8 kg (220 lb), SpO2 97%.  Body mass index is 33.45 kg/m .  General: No acute distress  Rectal exam: Normal rectal tone.  Prostate without palpable nodules or concern for malignancy       "

## 2024-01-16 NOTE — PROGRESS NOTES
Assessment & Plan   ASSESSMENT and PLAN  73 year old male here for reevaluation of prostate cancer screening and nephrolithiasis.    1.  Elevated PSA  - PSA tumor marker [[id number]]  - UA without Microscopic [[id number]]; Future  - UA without Microscopic [[id number]]  2. Prostate cancer screening  - PSA, screen; Future  3.  Nephrolithiasis    Patient's PSA at is almost doubled from last year.  Given normal exam, will recheck his PSA in 1 month.  If remains elevated will perform prostate MRI to make sure no sign of cancer.    No further follow-up of nephrolithiasis unless symptomatic.    Plan:  -PSA and visit 1 month    Time spent: 20 minutes spent on the date of the encounter doing chart review, history and exam, documentation and further activities as noted above.    Osiel Taylor MD   Urology  [facility]         Subjective     CHIEF COMPLAINT   follow-up of prostate cancer screening and nephrolithiasis.      HPI   Te Yoder is a very pleasant 73 year old male who presents with a history of prostate cancer screening and nephrolithiasis..    Since last visit, no flank pain, hematuria or concern for stone passage.  No new lower urinary tract symptoms.  No recent sexual activity, saddle activities, or urinary tract infection.    Labs:   Latest Reference Range & Units 11/11/21 14:23 01/16/23 12:54 01/16/24 10:07   PSA 0.00 - 4.00 ug/L 1.24 0.95 1.70      Latest Reference Range & Units 01/16/24 10:47   Color Urine Colorless, Straw, Light Yellow, Yellow  Yellow   Appearance Urine Clear  Clear   Glucose Urine Negative mg/dL >=1000 !   Bilirubin Urine Negative  Negative   Ketones Urine Negative mg/dL Negative   Specific Gravity Urine 1.003 - 1.035  1.020   pH Urine 5.0 - 7.0  5.5   Protein Albumin Urine Negative mg/dL Negative   Urobilinogen Urine 0.2, 1.0 E.U./dL 2.0 !   Nitrite Urine Negative  Negative   Blood Urine Negative  Trace !   Leukocyte Esterase Urine Negative  Negative   !: Data is  "abnormal     Objective     PHYSICAL EXAM  Patient is a 73 year old  male   Vitals: Blood pressure 112/73, pulse 83, height 1.727 m (5' 8\"), weight 99.8 kg (220 lb), SpO2 97%.  Body mass index is 33.45 kg/m .  General: No acute distress  Rectal exam: Normal rectal tone.  Prostate without palpable nodules or concern for malignancy         "

## 2024-01-16 NOTE — NURSING NOTE
Chief Complaint   Patient presents with    Benign Prostatic Hypertrophy     Here in clinic today for a psa draw    Talk about psa today   Everything is going well   Марина Monroy, CMA

## 2024-01-24 ENCOUNTER — TELEPHONE (OUTPATIENT)
Dept: FAMILY MEDICINE | Facility: CLINIC | Age: 74
End: 2024-01-24
Payer: COMMERCIAL

## 2024-01-24 DIAGNOSIS — I48.19 PERSISTENT ATRIAL FIBRILLATION (H): ICD-10-CM

## 2024-01-24 RX ORDER — METOPROLOL SUCCINATE 50 MG/1
50 TABLET, EXTENDED RELEASE ORAL DAILY
Qty: 90 TABLET | Refills: 3 | Status: SHIPPED | OUTPATIENT
Start: 2024-01-24

## 2024-01-24 NOTE — TELEPHONE ENCOUNTER
Reason for Call:  Other prescription    Detailed comments: Pharmacy reached out days ago requesting a refill now, pt. Is calling . He needs the refill of Motopidal. Only has few left.     Phone Number Patient can be reached at: Cell number on file:    Telephone Information:   Mobile [phone]       Best Time: any    Can we leave a detailed message on this number? YES    Call taken on 1/24/2024 at 1:14 PM by Radha Roman

## 2024-01-24 NOTE — TELEPHONE ENCOUNTER
I am a little confused, does he mean metoprolol?  If so, I sent a refill for that medication to his pharmacy

## 2024-01-25 ENCOUNTER — TRANSFERRED RECORDS (OUTPATIENT)
Dept: HEALTH INFORMATION MANAGEMENT | Facility: CLINIC | Age: 74
End: 2024-01-25
Payer: COMMERCIAL

## 2024-01-25 LAB — HBA1C MFR BLD: 7.1 %

## 2024-01-26 ENCOUNTER — TRANSFERRED RECORDS (OUTPATIENT)
Dept: FAMILY MEDICINE | Facility: CLINIC | Age: 74
End: 2024-01-26
Payer: COMMERCIAL

## 2024-02-25 NOTE — PROGRESS NOTES
Assessment & Plan   ASSESSMENT and PLAN  73 year old male here for reevaluation of elevated PSA, however on repeat .    Elevated PSA, resolved  2.  Prostate cancer screening    Discussed that now that PSA has come back down to his baseline, no need for further intervention or testing.    Patient would like to continue prostate cancer screening on a yearly interval.  Discussed AUA guidelines I recommend 2 to 4 years for patient's at low risk including himself.  Also discussed possibility not screening for prostate cancer given the risk of overdiagnosis or false positive testing as he recently experienced.  He is still interested in every year.    Plan:  - 1 year follow-up with PSA and rectal exam    Time spent: 15 minutes spent on the date of the encounter doing chart review, history and exam, documentation and further activities as noted above.    Osiel Taylor MD   Urology  [facility]         Subjective     CHIEF COMPLAINT   follow-up of elevated PSA.      HPI   Te Yoder is a very pleasant 73 year old male who presents with a history of prostate cancer screening and nephrolithiasis.  At last visit, PSA increased from 0.95 to 1.7.    Since last visit, no change in symptoms    Labs:   Latest Reference Range & Units 01/16/23 12:54 01/16/24 10:07 02/27/24 08:12   PSA 0.00 - 4.00 ug/L 0.95 1.70 0.98

## 2024-02-27 ENCOUNTER — OFFICE VISIT (OUTPATIENT)
Dept: UROLOGY | Facility: CLINIC | Age: 74
End: 2024-02-27
Payer: COMMERCIAL

## 2024-02-27 ENCOUNTER — OFFICE VISIT (OUTPATIENT)
Dept: OTHER | Facility: CLINIC | Age: 74
End: 2024-02-27
Attending: INTERNAL MEDICINE
Payer: COMMERCIAL

## 2024-02-27 VITALS
OXYGEN SATURATION: 96 % | HEIGHT: 68 IN | DIASTOLIC BLOOD PRESSURE: 72 MMHG | WEIGHT: 220 LBS | SYSTOLIC BLOOD PRESSURE: 116 MMHG | BODY MASS INDEX: 33.34 KG/M2 | HEART RATE: 92 BPM

## 2024-02-27 VITALS
BODY MASS INDEX: 33.59 KG/M2 | HEART RATE: 90 BPM | HEIGHT: 68 IN | OXYGEN SATURATION: 96 % | SYSTOLIC BLOOD PRESSURE: 110 MMHG | WEIGHT: 221.6 LBS | DIASTOLIC BLOOD PRESSURE: 72 MMHG

## 2024-02-27 DIAGNOSIS — E11.42 TYPE 2 DIABETES MELLITUS WITH DIABETIC POLYNEUROPATHY, WITH LONG-TERM CURRENT USE OF INSULIN (H): ICD-10-CM

## 2024-02-27 DIAGNOSIS — I87.2 LYMPHEDEMA DUE TO VENOUS INSUFFICIENCY: ICD-10-CM

## 2024-02-27 DIAGNOSIS — I48.20 CHRONIC ATRIAL FIBRILLATION (H): ICD-10-CM

## 2024-02-27 DIAGNOSIS — I87.2 VENOUS STASIS DERMATITIS OF BOTH LOWER EXTREMITIES: ICD-10-CM

## 2024-02-27 DIAGNOSIS — Z12.5 PROSTATE CANCER SCREENING: ICD-10-CM

## 2024-02-27 DIAGNOSIS — G47.33 OSA (OBSTRUCTIVE SLEEP APNEA): ICD-10-CM

## 2024-02-27 DIAGNOSIS — I83.893 VARICOSE VEINS OF BILATERAL LOWER EXTREMITIES WITH OTHER COMPLICATIONS: Primary | ICD-10-CM

## 2024-02-27 DIAGNOSIS — I89.0 LYMPHEDEMA DUE TO VENOUS INSUFFICIENCY: ICD-10-CM

## 2024-02-27 DIAGNOSIS — E66.811 CLASS 1 OBESITY WITH SERIOUS COMORBIDITY AND BODY MASS INDEX (BMI) OF 34.0 TO 34.9 IN ADULT, UNSPECIFIED OBESITY TYPE: ICD-10-CM

## 2024-02-27 DIAGNOSIS — I87.303 VENOUS HYPERTENSION OF BOTH LOWER EXTREMITIES: ICD-10-CM

## 2024-02-27 DIAGNOSIS — Z79.4 TYPE 2 DIABETES MELLITUS WITH DIABETIC POLYNEUROPATHY, WITH LONG-TERM CURRENT USE OF INSULIN (H): ICD-10-CM

## 2024-02-27 LAB — PSA SERPL-MCNC: 0.98 UG/L (ref 0–4)

## 2024-02-27 PROCEDURE — 36415 COLL VENOUS BLD VENIPUNCTURE: CPT | Performed by: UROLOGY

## 2024-02-27 PROCEDURE — G0103 PSA SCREENING: HCPCS | Performed by: UROLOGY

## 2024-02-27 PROCEDURE — 99213 OFFICE O/P EST LOW 20 MIN: CPT | Performed by: UROLOGY

## 2024-02-27 PROCEDURE — G0463 HOSPITAL OUTPT CLINIC VISIT: HCPCS | Performed by: INTERNAL MEDICINE

## 2024-02-27 PROCEDURE — 99215 OFFICE O/P EST HI 40 MIN: CPT | Performed by: INTERNAL MEDICINE

## 2024-02-27 PROCEDURE — G2211 COMPLEX E/M VISIT ADD ON: HCPCS | Performed by: INTERNAL MEDICINE

## 2024-02-27 RX ORDER — TRIAMCINOLONE ACETONIDE 1 MG/G
CREAM TOPICAL 2 TIMES DAILY
Qty: 80 G | Refills: 3 | Status: SHIPPED | OUTPATIENT
Start: 2024-02-27 | End: 2024-06-17

## 2024-02-27 ASSESSMENT — PAIN SCALES - GENERAL: PAINLEVEL: NO PAIN (0)

## 2024-02-27 NOTE — PATIENT INSTRUCTIONS
Use compression, elevate legs , DME Rx given      Lose weight elevate legs      Use triamcinolone 0.1% cream to leg area twice a day , New Rx given     Take Vein formula 1000 one pill twice a day for a month then decrease to one pill a day      Follow up in 6 months

## 2024-02-27 NOTE — PROGRESS NOTES
[facility] VASCULAR MEDICINE  FOLLOW UP VISIT      PRIMARY HEALTH CARE PROVIDER:  Gonzalez Akhtar MD      REASON FOR VISIT:    Follow up   Review of venous comp studies   Med refills   Taking vein formula   Evaluation and management of vascular causes of discoloration of bilateral lower extremities       HPI: Te Yoder is a 73 year old year old very pleasant male with multiple medical issues including the chronic atrial fibrillation currently on anticoagulation, nephrolithiasis, CKD, hypertension, hyperlipidemia, morbid obesity BMI greater than 34, type 2 diabetes mellitus gives a history of bilateral lower extremity discoloration for many years and also mild swelling.  He denies any intermittent claudication, rest pain or foot ulcers or leg ulcers.  No previous history of DVT or PE.  He underwent JAVIER recently with results as delineated below noncompressible vessels but improved with exercise and he has a Bi/triphasic waveforms        PAST MEDICAL HISTORY  Past Medical History:   Diagnosis Date    Afferent pupillary defect     Anemia     Atrial fibrillation (H) 12/10/14    EKG done at PMD 12/10/14     B12 deficiency     Calculus of kidney     PSA followed by urology    CKD (chronic kidney disease), stage II     Exotropia     HTN (hypertension)     Hyperlipidaemia     Hyperlipidemia     Microalbuminuria     Morbid obesity (H)     Nonspecific abnormal unspecified cardiovascular function study     Optic atrophy, left eye     LE, mild    Optic disc pallor     LE    ROC (obstructive sleep apnea)     ROC on CPAP     Palpitations     Phrenic neuropathy     Type II or unspecified type diabetes mellitus without mention of complication, not stated as uncontrolled     retinopathy and nephropathy - followed by Dr. Arnold       CURRENT MEDICATIONS  acetaminophen (TYLENOL) 650 MG CR tablet, Take 1,300 mg by mouth every 8 hours as needed for mild pain or fever  ASPIRIN NOT PRESCRIBED,  INTENTIONAL,, Reported on 3/8/2017  Continuous Blood Gluc Sensor (DEXCOM G6 SENSOR) MISC, Change every 10 days.  Continuous Blood Gluc Transmit (DEXCOM G6 TRANSMITTER) MISC,   Cyanocobalamin (VITAMIN B-12 SL), Take 2 tablets by mouth daily Dose unknown  dapagliflozin (FARXIGA) 10 MG TABS tablet, Take 10 mg by mouth daily  glimepiride (AMARYL) 4 MG tablet, Take 8 mg by mouth daily  hydrochlorothiazide (MICROZIDE) 12.5 MG capsule, TAKE TWO CAPSULES BY MOUTH DAILY  insulin lispro (HUMALOG KWIKPEN) 100 UNIT/ML (1 unit dial) KWIKPEN, Inject Subcutaneous 3 times daily (before meals) According to sliding scale  lisinopril (ZESTRIL) 20 MG tablet, Take 1 tablet (20 mg) by mouth 2 times daily  metFORMIN (GLUCOPHAGE) 1000 MG tablet, Take 1 tablet (1,000 mg) by mouth 2 times daily (take with meals)  metoprolol succinate ER (TOPROL XL) 50 MG 24 hr tablet, Take 1 tablet (50 mg) by mouth daily  rivaroxaban ANTICOAGULANT (XARELTO ANTICOAGULANT) 20 MG TABS tablet, TAKE ONE TABLET BY MOUTH ONE TIME DAILY WITH DINNER  rosuvastatin (CRESTOR) 20 MG tablet, Take 1 tablet (20 mg) by mouth daily  Semaglutide, 1 MG/DOSE, 4 MG/3ML SOPN, Inject 2 mg Subcutaneous once a week  ULTICARE 29G X 12.7MM insulin pen needle, USE ONCE DAILY  VITAMIN D (CHOLECALCIFEROL) PO, Take 1 tablet by mouth daily Dose unknown    No current facility-administered medications on file prior to visit.      PAST SURGICAL HISTORY:  Past Surgical History:   Procedure Laterality Date    COLONOSCOPY  2003    COLONOSCOPY  11/25/2013    Procedure: COLONOSCOPY;  COLONOSCOPY;  Surgeon: Uday Taylor MD;  Location:  GI       ALLERGIES     Allergies   Allergen Reactions    Simvastatin Other (See Comments)     Muscle aches, elevated CK levels       FAMILY HISTORY  Family History   Problem Relation Age of Onset    Heart Disease Father         disease    Arthritis Mother     Cancer - colorectal No family hx of        VASCULAR FAMILY HISTORY  1st order relative with  atherosclerotic PAD: No  1st order relative with AAA: No  Family history of Familial Hyperlipidemia No  Family History of Hypercoagulable state:No    VASCULAR RISK FACTORS  1. Diabetes:Yes controlled  2. Smoking: has never smoked.  3. HTN: controlled  4.Hyperlipidemia: Yes - controlled      SOCIAL HISTORY  Social History     Socioeconomic History    Marital status: Single     Spouse name: Not on file    Number of children: 0    Years of education: BA    Highest education level: Not on file   Occupational History    Occupation: retired office   Tobacco Use    Smoking status: Never    Smokeless tobacco: Never   Substance and Sexual Activity    Alcohol use: Yes     Alcohol/week: 0.0 standard drinks of alcohol     Comment: 4 drinks per week    Drug use: No    Sexual activity: Yes     Partners: Female   Other Topics Concern    Parent/sibling w/ CABG, MI or angioplasty before 65F 55M? Not Asked   Social History Narrative    Not on file     Social Determinants of Health     Financial Resource Strain: Not on file   Food Insecurity: Not on file   Transportation Needs: Not on file   Physical Activity: Not on file   Stress: Not on file   Social Connections: Not on file   Interpersonal Safety: Not on file   Housing Stability: Not on file       ROS:   General: No change in weight, sleep or appetite.  Normal energy.  No fever or chills  Eyes: Negative for vision changes or eye problems  ENT: No problems with ears, nose or throat.  No difficulty swallowing.  Resp: No coughing, wheezing or shortness of breath  CV: No chest pains or palpitations  GI: No nausea, vomiting,  heartburn, abdominal pain, diarrhea, constipation or change in bowel habits  : No urinary frequency or dysuria, bladder or kidney problems  Musculoskeletal: No significant muscle or joint pains  Neurologic: No headaches, numbness, tingling, weakness, problems with balance or coordination  Psychiatric: No problems with anxiety, depression or mental  "health  Heme/immune/allergy: No history of bleeding or clotting problems or anemia.  No allergies or immune system problems  Endocrine: No history of thyroid disease, diabetes or other endocrine disorders  Skin: No rashes,worrisome lesions or skin problems  Vascular:  Vascular:  Discoloration of both legs for many years  No claudication symptoms  No foot ulcers or leg ulcers  No history of a DVT or PE    EXAM:  /72 (BP Location: Right arm, Patient Position: Chair, Cuff Size: Adult Large)   Pulse 90   Ht 5' 8\" (1.727 m)   Wt 221 lb 9.6 oz (100.5 kg)   SpO2 96%   BMI 33.69 kg/m    In general, the patient is a pleasant male in no apparent distress.    HEENT: NC/AT.  PERRLA.  EOMI.  Sclerae white, not injected.  Nares clear.  Pharynx without erythema or exudate.  Dentition intact.    Neck: No adenopathy.  No thyromegaly. Carotids +2/2 bilaterally without bruits.  No jugular venous distension.   Heart: RRR. Normal S1, S2 splits physiologically. No murmur, rub, click, or gallop. The PMI is in the 5th ICS in the midclavicular line. There is no heave.    Lungs: CTA.  No ronchi, wheezes, rales.  No dullness to percussion.   Abdomen: Soft, nontender, nondistended. No organomegaly. No AAA.  No bruits.   Extremities: Vascular:  Bilateral lower extremity leg edema consistent with secondary lymphedema due to venous insufficiency  Venous stasis dermatitis in both lower extremities  Good palpable and dopplerable peripheral pulses  Bilateral lower extremity varicose veins CEAP CVI  No foot or leg ulcers         Labs:  LIPID RESULTS:  Lab Results   Component Value Date    CHOL 123 09/08/2023    CHOL 146 12/29/2020    HDL 33 (L) 09/08/2023    HDL 34 (L) 12/29/2020    LDL 67 09/08/2023    LDL 89 12/29/2020    TRIG 113 09/08/2023    TRIG 291 (A) 09/15/2021    TRIG 114 12/29/2020    CHOLHDLRATIO 3.8 01/30/2015       LIVER ENZYME RESULTS:  Lab Results   Component Value Date    AST 12 11/11/2021    AST 20 06/01/2021    ALT 23 " 11/11/2021    ALT 27 06/01/2021       CBC RESULTS:  Lab Results   Component Value Date    WBC 6.6 09/08/2023    WBC 7.7 06/01/2021    RBC 4.69 09/08/2023    RBC 4.86 06/01/2021    HGB 14.1 09/08/2023    HGB 15.1 06/01/2021    HCT 44.4 09/08/2023    HCT 45.7 06/01/2021    MCV 95 09/08/2023    MCV 94 06/01/2021    MCH 30.1 09/08/2023    MCH 31.1 06/01/2021    MCHC 31.8 09/08/2023    MCHC 33.0 06/01/2021    RDW 12.9 09/08/2023    RDW 14.0 06/01/2021     (L) 09/08/2023     06/01/2021       BMP RESULTS:  Lab Results   Component Value Date     09/08/2023     06/01/2021    POTASSIUM 4.0 09/08/2023    POTASSIUM 4.0 08/15/2022    POTASSIUM 3.9 06/01/2021    CHLORIDE 102 09/08/2023    CHLORIDE 105 08/15/2022    CHLORIDE 105 06/01/2021    CO2 26 09/08/2023    CO2 27 08/15/2022    CO2 28 06/01/2021    ANIONGAP 12 09/08/2023    ANIONGAP 6 08/15/2022    ANIONGAP 6 06/01/2021     (H) 09/08/2023     (H) 08/15/2022     (H) 06/01/2021    BUN 28.8 (H) 09/08/2023    BUN 24 08/15/2022    BUN 23 06/01/2021    CR 1.32 (H) 09/08/2023    CR 1.19 06/01/2021    GFRESTIMATED 57 (L) 09/08/2023    GFRESTIMATED 61 06/01/2021    GFRESTBLACK 71 06/01/2021    LEIGH 9.6 09/08/2023    LEIGH 9.2 06/01/2021        A1C RESULTS:  Lab Results   Component Value Date    A1C 6.8 (H) 09/08/2023    A1C 8.6 (H) 06/01/2021       THYROID RESULTS:  Lab Results   Component Value Date    TSH 3.28 06/01/2021       Procedures:     ULTRASOUND ANKLE-BRACHIAL INDEX DOPPLER WITH EXERCISE BILATERAL    9/19/2023 2:51 PM      HISTORY: Claudication (H).     COMPARISON: 3/8/2017     FINDINGS:  Right JAVIER:   PT: 1.17.  DP: 1.24.     Left JAVIER:   PT: 1.13.  DP: 1.12.      Waveforms: Multiphasic bilaterally.     Exercise: The patient was exercised on a treadmill at 1.5 mph at a 10%  incline for 5 minutes total. Minor discomfort at the left ankle that  patient stated was from his shoe.     Right exercise JAVIER: Noncompressible.  Left exercise  JAVIER: 1.38.                                                                      IMPRESSION:   1. Right resting JAVIER is normal without evidence of arterial  insufficiency, and post exercise JAVIER cannot be calculated secondary to  noncompressible vessels.  2. Left resting and exercise ABIs are normal without evidence of  arterial insufficiency.     JAVIER CRITERIA:  >1.4 NC  0.95-1.4 Normal  0.90 - 0.94 Mild  0.5 - 0.89 Moderate  0.2 - 0.49 Severe  <0.2 Critical     HAYDER GARCIA, DO       BILATERAL Venous Insufficiency Ultrasound (Date: 11/08/23)    BILATERAL Lower Extremity          Indication:  Surveillance Bilateral Symptomatic Varicose Veins, Pain, Swelling and Discolor.      Previous: None     Patient History: Swelling and Stasis     Presenting Symptoms:  Swelling, Varicose Veins, Pain, and Stasis     Technique:   Supine and Upright Ultrasound of the Deep and Superficial Veins with Valsalva and Compression Augmentation Maneuvers. Duplex Imaging is performed utilizing gray-scale, Two-dimensional images, color-flow imaging, Doppler waveform analysis, and Spectral doppler imaging done with provacative maneuvers.      Incompetency Criteria:  Deep vein reflux reported when greater than 1000 msec flow reversal. Superficial vein reflux reported when equal to or greater than 600 msec flow reversal.  vein reflux reported as greater than 350 msec flow reversal.      Right  Leg Deep Veins    CFV SFJ PFV PROX FV   PROX FV MID FV DIST POP V. PERON.   V. PTV'S   Compressibility  (FC,PC,NC) FC FC FC FC FC FC FC FC FC   Reflux +   - - - - -   -         Right Leg Saphenous Veins  Location SFJ PROX THIGH KNEE MID CALF SPJ PROX CALF MID CALF   RT GSV (mm) 5.3 5.3 5.2 3.4         Reflux - - - -         RT SSV (mm)         2.9 3.8 4.0   Reflux         - - +         Left Leg Deep Veins    CFV SFJ PFV PROX SFV PROX SFV MID SFV DIST POP V. PERON. V. PTV'S   Compressibility  (FC,PC,NC) FC FC FC FC FC FC FC FC FC   Reflux -   -  - - - -   -         Lt Leg Saphenous Veins   Location SFJ PROX THIGH KNEE MID CALF SPJ PROX CALF MID CALF   LT GSV (mm) 8.4 5.3 4.9 4.1         Reflux - - + -         LT SSV (mm)         3.3 3.9 3.2   Reflux         - - -         Comments: No incompetent  veins visualized.      Impression:       Right Deep Vein Findings: Patent deep venous system with no evidence of DVT and only isolated common femoral vein reflux. The rest of the deep veins are competent.     Left Deep Vein Findings: Patent deep venous system with no evidence of DVT and without deep venous reflux.     Superficial Vein Findings:      Right Greater Saphenous vein: Patent Greater Saphenous Vein without evidence of reflux.     Right Small Saphenous Vein: Patent Small Saphenous vein with Reflux noted in the mid calf with a Maximum diameter of 4 mm  .     Left Greater Saphenous Vein: Patent Greater Saphenous vein with Reflux noted isolated to the knee with a Maximum diameter of 4.9 mm in the refluxing segment. Max diameter is 8.4 at the SFJ and 5.3 in the proximal thigh within the competent segments .     Left Small Saphenous Vein: Patent Small Saphenous Vein without evidence of reflux.     Perforating and Accessory Veins: None     Reference:     Compressibility: FC= Fully compressible, PC= Partially compressible, NC= Non-compressible, NV= Not Visualized     Reflux: (+) Incompetent  (-) Competent, (NV) = Not Visualized     Interpretation criteria:          Duration of Retrograde flow (milliseconds)  Category Deep Veins Superficial Veins  veins   Competent < 1000ms < 500ms < 350ms   Incompetent > 1000ms > 500ms > 350ms             Assessment and Plan:   1. Varicose veins of bilateral lower extremities with other complications    2. Venous stasis dermatitis of both lower extremities  - triamcinolone (KENALOG) 0.1 % external cream; Apply topically 2 times daily To the leg area  Dispense: 80 g; Refill: 1  DME compression stockings     3.  Venous hypertension of both lower extremities    4. Lymphedema due to venous insufficiency      5. Class 1 obesity with serious comorbidity and body mass index (BMI) of 34.0 to 34.9 in adult, unspecified obesity type    6. ROC (obstructive sleep apnea)    7. Type 2 diabetes mellitus with diabetic polyneuropathy, with long-term current use of insulin (H)    8. Chronic atrial fibrillation (H)    This is a very pleasant 73-year-old male with multiple medical issues seen for evaluation and management of vascular causes of discoloration of the legs and abnormal JAVIER.  Reviewed his recent JAVIER as delineated above and no clinical evidence of severe arterial insufficiency.  He has no rest pain, nocturnal pain or foot ulcers or leg ulcers.  His main issue is chronic venous insufficiency with venous hypertension and stasis dermatitis and he is obese.  No recent venous competency studies and no previous venous ablation     I had a lengthy discussion with the patient and I have given education sheet on varicose veins/vein disorders    At present my recommendations,  Arrange bilateral lower extremity venous competency studies  Compression stockings 20 to 30 mmHg knee-high during the daytime and elevate the legs  Arrange referral to see edema therapist for compression, local care, possible Unna boot, edema wear or wraps etc.  Treat with triamcinolone 0.1% cream apply to the legs twice a day  Suggested vein formula 1000 and take 1 pill twice a day for first month then followed by 1 pill a day.  Information and prescription given  Lose weight  Elevate the legs  Follow-up in 6 months in the office      40 minutes spent on the date of the encounter doing chart review, history and exam, documentation, and further activities as noted above.        Copy of this note to primary care physician  This note was dictated by utilizing Dragon software    Pablo Enrique MD,CONTRERAS,FSVM,FNLA, FACP  Vascular Medicine  Clinical Hypertension  Specialist   Clinical Lipidologist

## 2024-02-27 NOTE — NURSING NOTE
Chief Complaint   Patient presents with    Benign Prostatic Hypertrophy    Talk about the psa today   Everything is going well   Марина Monroy, CMA

## 2024-02-27 NOTE — LETTER
2/27/2024       RE: Te Yoder  [address]  [place] [facility] [place] [facility] [address]     Dear Colleague,    Thank you for referring your patient, Te Yoder, to the [facility] at [facility]. Please see a copy of my visit note below.    Assessment & Plan  ASSESSMENT and PLAN  73 year old male here for reevaluation of elevated PSA, however on repeat .    Elevated PSA, resolved  2.  Prostate cancer screening    Discussed that now that PSA has come back down to his baseline, no need for further intervention or testing.    Patient would like to continue prostate cancer screening on a yearly interval.  Discussed AUA guidelines I recommend 2 to 4 years for patient's at low risk including himself.  Also discussed possibility not screening for prostate cancer given the risk of overdiagnosis or false positive testing as he recently experienced.  He is still interested in every year.    Plan:  - 1 year follow-up with PSA and rectal exam    Time spent: 15 minutes spent on the date of the encounter doing chart review, history and exam, documentation and further activities as noted above.    Osiel Taylor MD   Urology  [facility]         Subjective    CHIEF COMPLAINT   follow-up of elevated PSA.      HPI   Te Yoder is a very pleasant 73 year old male who presents with a history of prostate cancer screening and nephrolithiasis.  At last visit, PSA increased from 0.95 to 1.7.    Since last visit, no change in symptoms    Labs:   Latest Reference Range & Units 01/16/23 12:54 01/16/24 10:07 02/27/24 08:12   PSA 0.00 - 4.00 ug/L 0.95 1.70 0.98

## 2024-02-27 NOTE — PROGRESS NOTES
"Patient is here to discuss follow up    /72 (BP Location: Right arm, Patient Position: Chair, Cuff Size: Adult Large)   Pulse 90   Ht 5' 8\" (1.727 m)   Wt 221 lb 9.6 oz (100.5 kg)   SpO2 96%   BMI 33.69 kg/m      Questions patient would like addressed today are: N/A.    Refills are needed: No    Has homecare services and agency name:  Melina DILL    "

## 2024-03-05 SDOH — HEALTH STABILITY: PHYSICAL HEALTH: ON AVERAGE, HOW MANY DAYS PER WEEK DO YOU ENGAGE IN MODERATE TO STRENUOUS EXERCISE (LIKE A BRISK WALK)?: 2 DAYS

## 2024-03-05 SDOH — HEALTH STABILITY: PHYSICAL HEALTH: ON AVERAGE, HOW MANY MINUTES DO YOU ENGAGE IN EXERCISE AT THIS LEVEL?: 10 MIN

## 2024-03-05 ASSESSMENT — SOCIAL DETERMINANTS OF HEALTH (SDOH): HOW OFTEN DO YOU GET TOGETHER WITH FRIENDS OR RELATIVES?: THREE TIMES A WEEK

## 2024-03-12 ENCOUNTER — ORDERS ONLY (AUTO-RELEASED) (OUTPATIENT)
Dept: FAMILY MEDICINE | Facility: CLINIC | Age: 74
End: 2024-03-12

## 2024-03-12 ENCOUNTER — OFFICE VISIT (OUTPATIENT)
Dept: FAMILY MEDICINE | Facility: CLINIC | Age: 74
End: 2024-03-12
Payer: COMMERCIAL

## 2024-03-12 VITALS
TEMPERATURE: 97.5 F | SYSTOLIC BLOOD PRESSURE: 118 MMHG | HEIGHT: 68 IN | WEIGHT: 225 LBS | BODY MASS INDEX: 34.1 KG/M2 | HEART RATE: 85 BPM | DIASTOLIC BLOOD PRESSURE: 75 MMHG | OXYGEN SATURATION: 97 % | RESPIRATION RATE: 20 BRPM

## 2024-03-12 DIAGNOSIS — I10 PRIMARY HYPERTENSION: ICD-10-CM

## 2024-03-12 DIAGNOSIS — Z00.00 ENCOUNTER FOR MEDICARE ANNUAL WELLNESS EXAM: Primary | ICD-10-CM

## 2024-03-12 DIAGNOSIS — Z12.11 SCREEN FOR COLON CANCER: ICD-10-CM

## 2024-03-12 DIAGNOSIS — E78.5 HYPERLIPIDEMIA LDL GOAL <70: ICD-10-CM

## 2024-03-12 DIAGNOSIS — E08.21 DIABETES MELLITUS DUE TO UNDERLYING CONDITION WITH DIABETIC NEPHROPATHY, WITHOUT LONG-TERM CURRENT USE OF INSULIN (H): ICD-10-CM

## 2024-03-12 DIAGNOSIS — I48.91 ATRIAL FIBRILLATION, UNSPECIFIED TYPE (H): ICD-10-CM

## 2024-03-12 PROBLEM — E11.9 DIABETES MELLITUS, TYPE 2 (H): Status: RESOLVED | Noted: 2023-10-27 | Resolved: 2024-03-12

## 2024-03-12 PROBLEM — U07.1 COVID-19 VIRUS INFECTION: Status: RESOLVED | Noted: 2020-12-04 | Resolved: 2024-03-12

## 2024-03-12 LAB — HBA1C MFR BLD: 7.4 % (ref 0–5.6)

## 2024-03-12 PROCEDURE — 36415 COLL VENOUS BLD VENIPUNCTURE: CPT | Performed by: INTERNAL MEDICINE

## 2024-03-12 PROCEDURE — 83036 HEMOGLOBIN GLYCOSYLATED A1C: CPT | Mod: GZ | Performed by: INTERNAL MEDICINE

## 2024-03-12 PROCEDURE — G0439 PPPS, SUBSEQ VISIT: HCPCS | Performed by: INTERNAL MEDICINE

## 2024-03-12 RX ORDER — RESPIRATORY SYNCYTIAL VIRUS VACCINE 120MCG/0.5
0.5 KIT INTRAMUSCULAR ONCE
Qty: 1 EACH | Refills: 0 | Status: CANCELLED | OUTPATIENT
Start: 2024-03-12 | End: 2024-03-12

## 2024-03-12 ASSESSMENT — PAIN SCALES - GENERAL: PAINLEVEL: NO PAIN (0)

## 2024-03-12 NOTE — RESULT ENCOUNTER NOTE
The following letter pertains to your most recent diagnostic tests:    -Your hemoglobin A1c test which averages your blood sugars over the last 3 months returned at 7.4 which is at your goal of hemoglobin A1c at least less than 8.  That being said, the hemoglobin A1c is up from 6.8 last check.  Watching the starches and sugars in your diet can help improve this.        Sincerely,    Dr. Akhtar

## 2024-03-12 NOTE — PATIENT INSTRUCTIONS
Preventive Care Advice   This is general advice given by our system to help you stay healthy. However, your care team may have specific advice just for you. Please talk to your care team about your preventive care needs.  Nutrition  Eat 5 or more servings of fruits and vegetables each day.  Try wheat bread, brown rice and whole grain pasta (instead of white bread, rice, and pasta).  Get enough calcium and vitamin D. Check the label on foods and aim for 100% of the RDA (recommended daily allowance).  Lifestyle  Exercise at least 150 minutes each week   (30 minutes a day, 5 days a week).  Do muscle strengthening activities 2 days a week. These help control your weight and prevent disease.  No smoking.  Wear sunscreen to prevent skin cancer.  Have a dental exam and cleaning every 6 months.  Yearly exams  See your health care team every year to talk about:  Any changes in your health.  Any medicines your care team has prescribed.  Preventive care, family planning, and ways to prevent chronic diseases.  Shots (vaccines)   HPV shots (up to age 26), if you've never had them before.  Hepatitis B shots (up to age 59), if you've never had them before.  COVID-19 shot: Get this shot when it's due.  Flu shot: Get a flu shot every year.  Tetanus shot: Get a tetanus shot every 10 years.  Pneumococcal, hepatitis A, and RSV shots: Ask your care team if you need these based on your risk.  Shingles shot (for age 50 and up).  General health tests  Diabetes screening:  Starting at age 35, Get screened for diabetes at least every 3 years.  If you are younger than age 35, ask your care team if you should be screened for diabetes.  Cholesterol test: At age 39, start having a cholesterol test every 5 years, or more often if advised.  Bone density scan (DEXA): At age 50, ask your care team if you should have this scan for osteoporosis (brittle bones).  Hepatitis C: Get tested at least once in your life.  STIs (sexually transmitted  infections)  Before age 24: Ask your care team if you should be screened for STIs.  After age 24: Get screened for STIs if you're at risk. You are at risk for STIs (including HIV) if:  You are sexually active with more than one person.  You don't use condoms every time.  You or a partner was diagnosed with a sexually transmitted infection.  If you are at risk for HIV, ask about PrEP medicine to prevent HIV.  Get tested for HIV at least once in your life, whether you are at risk for HIV or not.  Cancer screening tests  Cervical cancer screening: If you have a cervix, begin getting regular cervical cancer screening tests at age 21. Most people who have regular screenings with normal results can stop after age 65. Talk about this with your provider.  Breast cancer scan (mammogram): If you've ever had breasts, begin having regular mammograms starting at age 40. This is a scan to check for breast cancer.  Colon cancer screening: It is important to start screening for colon cancer at age 45.  Have a colonoscopy test every 10 years (or more often if you're at risk) Or, ask your provider about stool tests like a FIT test every year or Cologuard test every 3 years.  To learn more about your testing options, visit: https://www.Pager/701236.pdf.  For help making a decision, visit: https://bit.ly/pl54520.  Prostate cancer screening test: If you have a prostate and are age 55 to 69, ask your provider if you would benefit from a yearly prostate cancer screening test.  Lung cancer screening: If you are a current or former smoker age 50 to 80, ask your care team if ongoing lung cancer screenings are right for you.  For informational purposes only. Not to replace the advice of your health care provider. Copyright   2023 [employer]. All rights reserved. Clinically reviewed by the [facility]itions Program. Coreworks 200525 - REV 01/24.    Preventing Falls: Care Instructions  Injuries and health  problems such as trouble walking or poor eyesight can increase your risk of falling. So can some medicines. But there are things you can do to help prevent falls. You can exercise to get stronger. You can also arrange your home to make it safer.    Talk to your doctor about the medicines you take. Ask if any of them increase the risk of falls and whether they can be changed or stopped.   Try to exercise regularly. It can help improve your strength and balance. This can help lower your risk of falling.     Practice fall safety and prevention.    Wear low-heeled shoes that fit well and give your feet good support. Talk to your doctor if you have foot problems that make this hard.  Carry a cellphone or wear a medical alert device that you can use to call for help.  Use stepladders instead of chairs to reach high objects. Don't climb if you're at risk for falls. Ask for help, if needed.  Wear the correct eyeglasses, if you need them.    Make your home safer.    Remove rugs, cords, clutter, and furniture from walkways.  Keep your house well lit. Use night-lights in hallways and bathrooms.  Install and use sturdy handrails on stairways.  Wear nonskid footwear, even inside. Don't walk barefoot or in socks without shoes.    Be safe outside.    Use handrails, curb cuts, and ramps whenever possible.  Keep your hands free by using a shoulder bag or backpack.  Try to walk in well-lit areas. Watch out for uneven ground, changes in pavement, and debris.  Be careful in the winter. Walk on the grass or gravel when sidewalks are slippery. Use de-icer on steps and walkways. Add non-slip devices to shoes.    Put grab bars and nonskid mats in your shower or tub and near the toilet. Try to use a shower chair or bath bench when bathing.   Get into a tub or shower by putting in your weaker leg first. Get out with your strong side first. Have a phone or medical alert device in the bathroom with you.   Where can you learn more?  Go to  "https://www.[employer].net/patiented  Enter G117 in the search box to learn more about \"Preventing Falls: Care Instructions.\"  Current as of: July 18, 2023               Content Version: 13.8    8048-9732 [employer].   Care instructions adapted under license by your healthcare professional. If you have questions about a medical condition or this instruction, always ask your healthcare professional. [employer] disclaims any warranty or liability for your use of this information.      Substance Use Disorder: Care Instructions  Overview     You can improve your life and health by stopping your use of alcohol or drugs. When you don't drink or use drugs, you may feel and sleep better. You may get along better with your family, friends, and coworkers. There are medicines and programs that can help with substance use disorder.  How can you care for yourself at home?  Here are some ways to help you stay sober and prevent relapse.  If you have been given medicine to help keep you sober or reduce your cravings, be sure to take it exactly as prescribed.  Talk to your doctor about programs that can help you stop using drugs or drinking alcohol.  Do not keep alcohol or drugs in your home.  Plan ahead. Think about what you'll say if other people ask you to drink or use drugs. Try not to spend time with people who drink or use drugs.  Use the time and money spent on drinking or drugs to do something that's important to you.  Preventing a relapse  Have a plan to deal with relapse. Learn to recognize changes in your thinking that lead you to drink or use drugs. Get help before you start to drink or use drugs again.  Try to stay away from situations, friends, or places that may lead you to drink or use drugs.  If you feel the need to drink alcohol or use drugs again, seek help right away. Call a trusted friend or family member. Some people get support from organizations such as Narcotics Anonymous or SMART " Recovery or from treatment facilities.  If you relapse, get help as soon as you can. Some people make a plan with another person that outlines what they want that person to do for them if they relapse. The plan usually includes how to handle the relapse and who to notify in case of relapse.  Don't give up. Remember that a relapse doesn't mean that you have failed. Use the experience to learn the triggers that lead you to drink or use drugs. Then quit again. Recovery is a lifelong process. Many people have several relapses before they are able to quit for good.  Follow-up care is a key part of your treatment and safety. Be sure to make and go to all appointments, and call your doctor if you are having problems. It's also a good idea to know your test results and keep a list of the medicines you take.  When should you call for help?   Call 911  anytime you think you may need emergency care. For example, call if you or someone else:    Has overdosed or has withdrawal signs. Be sure to tell the emergency workers that you are or someone else is using or trying to quit using drugs. Overdose or withdrawal signs may include:  Losing consciousness.  Seizure.  Seeing or hearing things that aren't there (hallucinations).     Is thinking or talking about suicide or harming others.   Where to get help 24 hours a day, 7 days a week   If you or someone you know talks about suicide, self-harm, a mental health crisis, a substance use crisis, or any other kind of emotional distress, get help right away. You can:    Call the Suicide and Crisis Lifeline at 988.     Call 4-474-077-TALK (1-439.349.8928).     Text HOME to 577954 to access the Crisis Text Line.   Consider saving these numbers in your phone.  Go to [employer] for more information or to chat online.  Call your doctor now or seek immediate medical care if:    You are having withdrawal symptoms. These may include nausea or vomiting, sweating, shakiness, and anxiety.  "  Watch closely for changes in your health, and be sure to contact your doctor if:    You have a relapse.     You need more help or support to stop.   Where can you learn more?  Go to https://www.[employer].net/patiented  Enter H573 in the search box to learn more about \"Substance Use Disorder: Care Instructions.\"  Current as of: March 21, 2023               Content Version: 13.8    8162-4269 [employer].   Care instructions adapted under license by your healthcare professional. If you have questions about a medical condition or this instruction, always ask your healthcare professional. [employer] disclaims any warranty or liability for your use of this information.      "

## 2024-03-12 NOTE — LETTER
March 12, 2024      Te Yoder  [address] [address]        Dear ,    We are writing to inform you of your test results.    -Your hemoglobin A1c test which averages your blood sugars over the last 3 months returned at 7.4 which is at your goal of hemoglobin A1c at least less than 8.  That being said, the hemoglobin A1c is up from 6.8 last check.  Watching the starches and sugars in your diet can help improve this.       Resulted Orders   HEMOGLOBIN A1C   Result Value Ref Range    Hemoglobin A1C 7.4 (H) 0.0 - 5.6 %      Comment:      Normal <5.7%   Prediabetes 5.7-6.4%    Diabetes 6.5% or higher     Note: Adopted from ADA consensus guidelines.       If you have any questions or concerns, please call the clinic at the number listed above.       Sincerely,      Gonzalez Akhtar MD

## 2024-03-20 ENCOUNTER — OFFICE VISIT (OUTPATIENT)
Dept: PHARMACY | Facility: CLINIC | Age: 74
End: 2024-03-20
Payer: COMMERCIAL

## 2024-03-20 VITALS — DIASTOLIC BLOOD PRESSURE: 60 MMHG | BODY MASS INDEX: 34.36 KG/M2 | WEIGHT: 226 LBS | SYSTOLIC BLOOD PRESSURE: 112 MMHG

## 2024-03-20 DIAGNOSIS — E78.5 HYPERLIPIDEMIA LDL GOAL <100: ICD-10-CM

## 2024-03-20 DIAGNOSIS — R52 PAIN: ICD-10-CM

## 2024-03-20 DIAGNOSIS — I48.91 ATRIAL FIBRILLATION, UNSPECIFIED TYPE (H): ICD-10-CM

## 2024-03-20 DIAGNOSIS — I10 PRIMARY HYPERTENSION: ICD-10-CM

## 2024-03-20 DIAGNOSIS — Z79.4 TYPE 2 DIABETES MELLITUS WITH DIABETIC NEPHROPATHY, WITH LONG-TERM CURRENT USE OF INSULIN (H): Primary | ICD-10-CM

## 2024-03-20 DIAGNOSIS — E11.21 TYPE 2 DIABETES MELLITUS WITH DIABETIC NEPHROPATHY, WITH LONG-TERM CURRENT USE OF INSULIN (H): Primary | ICD-10-CM

## 2024-03-20 DIAGNOSIS — Z78.9 TAKES DIETARY SUPPLEMENTS: ICD-10-CM

## 2024-03-20 DIAGNOSIS — R21 RASH: ICD-10-CM

## 2024-03-20 PROCEDURE — 99605 MTMS BY PHARM NP 15 MIN: CPT | Performed by: PHARMACIST

## 2024-03-20 PROCEDURE — 99607 MTMS BY PHARM ADDL 15 MIN: CPT | Performed by: PHARMACIST

## 2024-03-20 NOTE — PROGRESS NOTES
Medication Therapy Management (MTM) Encounter    ASSESSMENT:                            Medication Adherence/Access: No issues identified    Diabetes:   Stable.  Patient is meeting A1c goal of < 8%.    Hypertension/A. Fib:   Stable. Patient is meeting blood pressure goal of < 130/80mmHg.    Hyperlipidemia:   Stable.      Pain:  Stable.    Rash:  Stable.    Supplements:  Stable.     PLAN:                            Continue current medication regimen.     Follow-up: Return in about 6 months (around 9/20/2024) for Follow-up Medication Review.    SUBJECTIVE/OBJECTIVE:                          Te Yoder is a 73 year old male coming in for a follow-up visit.       Reason for visit: Routine follow-up.    Tobacco: He reports that he has never smoked. He has never used smokeless tobacco.  Alcohol: 1-3 beverages / week    Medication Adherence/Access: no issues reported    Diabetes   Farxiga 10mg daily  Glimepiride 8mg daily  Humalog three times daily based on sliding scale - generally taking 15-25 units as needed based on elevated blood sugar (uses this to correct blood sugar vs proactively although encouraged to take prior to meals in the past)  Metformin 1000mg twice daily   Ozempic 2mg weekly  Not taking aspirin due to Xarelto use  Patient is not experiencing side effects.  Blood sugar monitoring: Continuous Glucose Monitor   Current diabetes symptoms: none  Diet/Exercise: Has been going to the gym to stay active while he's not working over the winter.  Has his physical testing for work at the end of March, will work for the summer assuming he passes     Eye exam is up to date  Foot exam is up to date  Urine Albumin:   Lab Results   Component Value Date    UMALCR 27.08 (H) 09/08/2023      Lab Results   Component Value Date    A1C 7.4 (H) 03/12/2024     Hypertension /A. Fib:  Xarelto 20mg daily  Hydrochlorothiazide 25mg daily  Lisinopril 20mg twice daily   Metoprolol succinate 50mg daily  Patient reports no current  medication side effects  Patient does not self-monitor blood pressure.       BP Readings from Last 3 Encounters:   03/12/24 118/75   02/27/24 110/72   02/27/24 116/72     Pulse Readings from Last 3 Encounters:   03/12/24 85   02/27/24 90   02/27/24 92     Hyperlipidemia   Rosuvastatin 20mg daily  Patient reports no significant myalgias or other side effects.     Recent Labs   Lab Test 09/08/23  1032 08/15/22  1543   CHOL 123 118   HDL 33* 32*   LDL 67 67   TRIG 113 96     Pain:  Acetaminophen 650-1300mg as needed - occasionally takes when working in the summer  He finds this effective and denies side effects    Rash:  Triamcinolone 0.1% cream as needed   He reports this is effective, no side effects reported.    Supplements   Cinnamon 2-3 capsules daily - dose unknown  Chromium picolinate 1 capsule daily  Lymphatic Support 1000 2 capsules daily - recommended by Dr. Enrique  Vitamin B12 daily - dose unknown  Vitamin D daily - dose unknown  No reported issues at this time.         Today's Vitals: /60   Wt 226 lb (102.5 kg)   BMI 34.36 kg/m    ----------------    I spent 50 minutes with this patient today. A copy of the visit note was provided to the patient's provider(s).    A summary of these recommendations was given to the patient.    Tosin Gillette, PharmD, [facility]  Medication Therapy Management Provider  [phone]      Medication Therapy Recommendations  No medication therapy recommendations to display

## 2024-03-20 NOTE — LETTER
March 20, 2024  Te Yoder  [address] [place] [id number]    Dear  Yoder, [facility]     Thank you for talking with me on Mar 20, 2024 about your health and medications. As a follow-up to our conversation, I have included two documents:      Your Recommended To-Do List has steps you should take to get the best results from your medications.  Your Medication List will help you keep track of your medications and how to take them.    If you want to talk about these documents, please call Tosin Gillette PharmD at phone: [phone], Monday-Friday 8-4:30pm.    I look forward to working with you and your doctors to make sure your medications work well for you.    Sincerely,  Tosin Gillette PharmD  [facility] Pharmacist, [facility]

## 2024-03-20 NOTE — LETTER
"Recommended To-Do List      Prepared on: 03/20/2024       You can get the best results from your medications by completing the items on this \"To-Do List.\"      Bring your To-Do List when you go to your doctor. And, share it with your family or caregivers.    My To-Do List:  What we talked about: What I should do:   What my medicines are for, how to know if my medicines are working, made sure my medicines are safe for me and reviewed how to take my medicines.    Take my medicines every day               "

## 2024-03-20 NOTE — PATIENT INSTRUCTIONS
"Recommendations from today's MTM visit:                                                    MTM (medication therapy management) is a service provided by a clinical pharmacist designed to help you get the most of out of your medicines.   Today we reviewed what your medicines are for, how to know if they are working, that your medicines are safe and how to make your medicine regimen as easy as possible.      Continue current medication regimen.     Follow-up: Return in about 6 months (around 9/20/2024) for Follow-up Medication Review.    It was great speaking with you today.  I value your experience and would be very thankful for your time in providing feedback in our clinic survey. In the next few days, you may receive an email or text message from [employer] with a link to a survey related to your  clinical pharmacist.\"     To schedule another MTM appointment, please call the clinic directly or you may call the MTM scheduling line at [phone] or toll-free at [phone].     My Clinical Pharmacist's contact information:                                                      Please feel free to contact me with any questions or concerns you have.      Tosin Gillette, [place]via, [facility]  Medication Therapy Management Provider  [phone]    "

## 2024-03-20 NOTE — LETTER
_  Medication List        Prepared on: 03/20/2024     Bring your Medication List when you go to the doctor, hospital, or   emergency room. And, share it with your family or caregivers.     Note any changes to how you take your medications.  Cross out medications when you no longer use them.    Medication How I take it Why I use it Prescriber   acetaminophen (TYLENOL) 650 MG CR tablet Take 1,300 mg by mouth every 8 hours as needed for mild pain or fever Pain Self   Chromium Picolinate (CHROMIUM PICOLATE PO) Take 1 capsule by mouth daily General Health  Self   CINNAMON PO Take 2-3 capsules by mouth daily General Health  Self   Continuous Blood Gluc Sensor (DEXCOM G6 SENSOR) MISC Change every 10 days. Type 2 diabetes Patient Reported   Continuous Blood Gluc Transmit (DEXCOM G6 TRANSMITTER) MISC   Type 2 diabetes Patient Reported   Cyanocobalamin (VITAMIN B-12 SL) Take 2 tablets by mouth daily Dose unknown General Health  Self   dapagliflozin (FARXIGA) 10 MG TABS tablet Take 10 mg by mouth daily Type 2 diabetes Patient Reported   glimepiride (AMARYL) 4 MG tablet Take 8 mg by mouth daily Type 2 diabetes Patient Reported   hydrochlorothiazide (MICROZIDE) 12.5 MG capsule TAKE TWO CAPSULES BY MOUTH DAILY Essential Hypertension Gonzalez Akhtar MD   insulin lispro (HUMALOG KWIKPEN) 100 UNIT/ML (1 unit dial) KWIKPEN Inject Subcutaneous 3 times daily (before meals) According to sliding scale Type 2 diabetes Patient Reported   lisinopril (ZESTRIL) 20 MG tablet Take 1 tablet (20 mg) by mouth 2 times daily Essential hypertension with goal blood pressure less than 140/90 Gonzalez Akhtar MD   metFORMIN (GLUCOPHAGE) 1000 MG tablet Take 1 tablet (1,000 mg) by mouth 2 times daily (take with meals) Type 2 diabetes mellitus without complication, with long-term current use of insulin (H) Gonzalez Akhtar MD   metoprolol succinate ER (TOPROL XL) 50 MG 24 hr tablet Take 1 tablet (50 mg) by mouth daily Persistent Atrial Fibrillation (H) Gonzalez  SHELLY Akhtar MD   rivaroxaban ANTICOAGULANT (XARELTO ANTICOAGULANT) 20 MG TABS tablet TAKE ONE TABLET BY MOUTH ONE TIME DAILY WITH DINNER Atrial fibrillation, unspecified type (H) Alexandr Bowen PA-C   rosuvastatin (CRESTOR) 20 MG tablet Take 1 tablet (20 mg) by mouth daily Coronary artery calcification Gonzalez Akhtar MD   Semaglutide, 1 MG/DOSE, 4 MG/3ML SOPN Inject 2 mg Subcutaneous once a week Type 2 diabetes Patient Reported   triamcinolone (KENALOG) 0.1 % external cream Apply topically 2 times daily To the leg area Venous stasis dermatitis of both lower extremities Pablo Enrique MD   UNABLE TO FIND Take 2 capsules by mouth daily MEDICATION NAME: Lymphatic Support 1000 General Health  Self   VITAMIN D (CHOLECALCIFEROL) PO Take 1 tablet by mouth daily Dose unknown General Health  Self         Add new medications, over-the-counter drugs, herbals, vitamins, or  minerals in the blank rows below.    Medication How I take it Why I use it Prescriber                                      Allergies:      simvastatin        Side effects I have had:               Other Information:              My notes and questions:

## 2024-03-24 LAB — NONINV COLON CA DNA+OCC BLD SCRN STL QL: NEGATIVE

## 2024-04-20 DIAGNOSIS — E11.9 TYPE 2 DIABETES MELLITUS WITHOUT COMPLICATION, WITH LONG-TERM CURRENT USE OF INSULIN (H): ICD-10-CM

## 2024-04-20 DIAGNOSIS — Z79.4 TYPE 2 DIABETES MELLITUS WITHOUT COMPLICATION, WITH LONG-TERM CURRENT USE OF INSULIN (H): ICD-10-CM

## 2024-05-09 ENCOUNTER — TRANSFERRED RECORDS (OUTPATIENT)
Dept: HEALTH INFORMATION MANAGEMENT | Facility: CLINIC | Age: 74
End: 2024-05-09
Payer: COMMERCIAL

## 2024-06-11 ENCOUNTER — TELEPHONE (OUTPATIENT)
Dept: OTHER | Facility: CLINIC | Age: 74
End: 2024-06-11
Payer: COMMERCIAL

## 2024-06-11 DIAGNOSIS — I87.2 VENOUS STASIS DERMATITIS OF BOTH LOWER EXTREMITIES: ICD-10-CM

## 2024-06-12 RX ORDER — TRIAMCINOLONE ACETONIDE 1 MG/G
CREAM TOPICAL
Qty: 80 G | Refills: 0 | OUTPATIENT
Start: 2024-06-12

## 2024-06-15 DIAGNOSIS — L85.3 DRY SKIN: ICD-10-CM

## 2024-06-17 RX ORDER — TRIAMCINOLONE ACETONIDE 1 MG/G
CREAM TOPICAL 2 TIMES DAILY
Qty: 80 G | Refills: 1 | Status: SHIPPED | OUTPATIENT
Start: 2024-06-17

## 2024-06-17 NOTE — TELEPHONE ENCOUNTER
[facility]    Who is the name of the provider?:  GA HANSON   What is the location you see this provider at/preferred location?: [place]  Person calling / Facility: Te Yoder  Phone number:  [phone] (home)   Nurse call back needed:  N/A     Reason for call:  Patient called requesting refill for triamicinole    Pharmacy location:  [id number] - [place], [place] - [address]  Outside Imaging: n/a   Can we leave a detailed message on this number?  YES     6/17/2024, 11:51 AM

## 2024-06-18 RX ORDER — AMMONIUM LACTATE 12 G/100G
CREAM TOPICAL 2 TIMES DAILY PRN
Qty: 385 G | Refills: 0 | Status: SHIPPED | OUTPATIENT
Start: 2024-06-18

## 2024-07-18 DIAGNOSIS — I10 ESSENTIAL HYPERTENSION WITH GOAL BLOOD PRESSURE LESS THAN 140/90: ICD-10-CM

## 2024-07-18 RX ORDER — LISINOPRIL 20 MG/1
20 TABLET ORAL 2 TIMES DAILY
Qty: 180 TABLET | Refills: 0 | Status: SHIPPED | OUTPATIENT
Start: 2024-07-18 | End: 2024-07-29

## 2024-07-28 DIAGNOSIS — I10 ESSENTIAL HYPERTENSION WITH GOAL BLOOD PRESSURE LESS THAN 140/90: ICD-10-CM

## 2024-07-29 RX ORDER — LISINOPRIL 20 MG/1
20 TABLET ORAL 2 TIMES DAILY
Qty: 180 TABLET | Refills: 2 | Status: SHIPPED | OUTPATIENT
Start: 2024-07-29

## 2024-07-30 ENCOUNTER — OFFICE VISIT (OUTPATIENT)
Dept: PHARMACY | Facility: CLINIC | Age: 74
End: 2024-07-30
Payer: COMMERCIAL

## 2024-07-30 VITALS — WEIGHT: 219 LBS | DIASTOLIC BLOOD PRESSURE: 64 MMHG | BODY MASS INDEX: 33.3 KG/M2 | SYSTOLIC BLOOD PRESSURE: 112 MMHG

## 2024-07-30 DIAGNOSIS — E78.5 HYPERLIPIDEMIA LDL GOAL <100: ICD-10-CM

## 2024-07-30 DIAGNOSIS — I48.91 ATRIAL FIBRILLATION, UNSPECIFIED TYPE (H): ICD-10-CM

## 2024-07-30 DIAGNOSIS — R21 RASH: ICD-10-CM

## 2024-07-30 DIAGNOSIS — I10 PRIMARY HYPERTENSION: ICD-10-CM

## 2024-07-30 DIAGNOSIS — Z78.9 TAKES DIETARY SUPPLEMENTS: ICD-10-CM

## 2024-07-30 DIAGNOSIS — R52 PAIN: ICD-10-CM

## 2024-07-30 DIAGNOSIS — Z79.4 TYPE 2 DIABETES MELLITUS WITH DIABETIC NEPHROPATHY, WITH LONG-TERM CURRENT USE OF INSULIN (H): Primary | ICD-10-CM

## 2024-07-30 DIAGNOSIS — E11.21 TYPE 2 DIABETES MELLITUS WITH DIABETIC NEPHROPATHY, WITH LONG-TERM CURRENT USE OF INSULIN (H): Primary | ICD-10-CM

## 2024-07-30 PROCEDURE — 99607 MTMS BY PHARM ADDL 15 MIN: CPT | Performed by: PHARMACIST

## 2024-07-30 PROCEDURE — 99606 MTMS BY PHARM EST 15 MIN: CPT | Performed by: PHARMACIST

## 2024-07-30 NOTE — PROGRESS NOTES
Medication Therapy Management (MTM) Encounter    ASSESSMENT:                            Medication Adherence/Access: No issues identified    Diabetes:   Stable.  Patient is meeting A1c goal of < 8%.    Hypertension/A. Fib:   Stable. Patient is meeting blood pressure goal of < 130/80mmHg.    Hyperlipidemia:   Stable. LDL is at goal <70mg/dL per ADA guidelines.    Pain:  Stable.    Rash:  Stable.    Supplements:  Stable.     PLAN:                            Continue current medication regimen.     Follow-up: Return in about 3 months (around 10/30/2024) for Follow-up Medication Review.    SUBJECTIVE/OBJECTIVE:                          Te Yoder is a 73 year old male seen for a follow-up visit.       Reason for visit: Routine follow-up.    Tobacco: He reports that he has never smoked. He has never used smokeless tobacco.  Alcohol: 1-3 beverages / week    Medication Adherence/Access: no issues reported    Diabetes   Farxiga 10mg daily  Glimepiride 8mg daily  Humalog three times daily based on sliding scale - generally taking 15-25 units as needed based on elevated blood sugar (uses this to correct blood sugar vs proactively although encouraged to take prior to meals in the past, he acknowledges he's been missing some doses and attributes elevated blood sugars to this)  Metformin 1000mg twice daily   Ozempic 2mg weekly  Not taking aspirin due to Xarelto use  Has upcoming appointment with endocrinology.  Patient is not experiencing side effects.  Blood sugar monitoring: Continuous Glucose Monitor     Current diabetes symptoms: none, feels lows last week were a sensor issue  Diet/Exercise: He's been working this summer, as a result he's been more active.   Eye exam in the last 12 months? Yes- Date of last eye exam: 1/2/2024,  Location: Dr. Kinney at [facility]  Foot exam is up to date  Lab Results   Component Value Date    A1C 7.4 03/12/2024    A1C 6.8 09/08/2023     Hypertension /A. Fib:  Xarelto 20mg  daily  Hydrochlorothiazide 25mg daily  Lisinopril 20mg twice daily   Metoprolol succinate 50mg daily  Patient reports no current medication side effects  Patient does not self-monitor blood pressure.     BP Readings from Last 3 Encounters:   07/30/24 112/64   03/20/24 112/60   03/12/24 118/75      Hyperlipidemia   Rosuvastatin 20mg daily  Patient reports no significant myalgias or other side effects.     Pain:  Acetaminophen 650-1300mg as needed - occasionally takes when working in the summer  He finds this effective and denies side effects     Rash:  Amlactin cream twice daily as needed   Triamcinolone 0.1% cream as needed   He reports this is effective, no side effects reported.    Supplements   Chromium picolinate 1 capsule daily  Cinnamon 2-3 capsules daily - dose unknown  Lymphatic Support 1000 2 capsules daily - recommended by Dr. Enrique  Vitamin B12 daily - dose unknown  Vitamin D daily - dose unknown  No reported issues at this time.      Today's Vitals: /64   Wt 219 lb (99.3 kg)   BMI 33.30 kg/m    ----------------    I spent 30 minutes with this patient today. A copy of the visit note was provided to the patient's provider(s).    A summary of these recommendations was given to the patient.    Tosin Gillette, PharmD, [facility]  Medication Therapy Management Provider  [phone]      Medication Therapy Recommendations  No medication therapy recommendations to display

## 2024-07-30 NOTE — PATIENT INSTRUCTIONS
"Recommendations from today's MTM visit:                                                    MTM (medication therapy management) is a service provided by a clinical pharmacist designed to help you get the most of out of your medicines.   Today we reviewed what your medicines are for, how to know if they are working, that your medicines are safe and how to make your medicine regimen as easy as possible.      Continue current medication regimen.     Follow-up: Return in about 3 months (around 10/30/2024) for Follow-up Medication Review.    It was great speaking with you today.  I value your experience and would be very thankful for your time in providing feedback in our clinic survey. In the next few days, you may receive an email or text message from [employer] with a link to a survey related to your  clinical pharmacist.\"     To schedule another MTM appointment, please call the clinic directly or you may call the MTM scheduling line at [phone] or toll-free at [phone].     My Clinical Pharmacist's contact information:                                                      Please feel free to contact me with any questions or concerns you have.      Tosin Gillette, Elvia, [facility]  Medication Therapy Management Provider  [phone]    "

## 2024-08-27 ENCOUNTER — TRANSFERRED RECORDS (OUTPATIENT)
Dept: HEALTH INFORMATION MANAGEMENT | Facility: CLINIC | Age: 74
End: 2024-08-27
Payer: COMMERCIAL

## 2024-08-27 LAB — RETINOPATHY: NEGATIVE

## 2024-09-09 ENCOUNTER — TRANSFERRED RECORDS (OUTPATIENT)
Dept: HEALTH INFORMATION MANAGEMENT | Facility: CLINIC | Age: 74
End: 2024-09-09
Payer: COMMERCIAL

## 2024-09-12 ENCOUNTER — OFFICE VISIT (OUTPATIENT)
Dept: FAMILY MEDICINE | Facility: CLINIC | Age: 74
End: 2024-09-12
Payer: COMMERCIAL

## 2024-09-12 VITALS
BODY MASS INDEX: 33.06 KG/M2 | HEART RATE: 90 BPM | TEMPERATURE: 97.7 F | DIASTOLIC BLOOD PRESSURE: 64 MMHG | RESPIRATION RATE: 18 BRPM | SYSTOLIC BLOOD PRESSURE: 101 MMHG | OXYGEN SATURATION: 95 % | WEIGHT: 217.4 LBS

## 2024-09-12 DIAGNOSIS — I48.91 ATRIAL FIBRILLATION, UNSPECIFIED TYPE (H): ICD-10-CM

## 2024-09-12 DIAGNOSIS — I10 HYPERTENSION, UNSPECIFIED TYPE: ICD-10-CM

## 2024-09-12 DIAGNOSIS — E78.5 HYPERLIPIDEMIA LDL GOAL <70: ICD-10-CM

## 2024-09-12 DIAGNOSIS — Z79.4 TYPE 2 DIABETES MELLITUS WITHOUT COMPLICATION, WITH LONG-TERM CURRENT USE OF INSULIN (H): Primary | ICD-10-CM

## 2024-09-12 DIAGNOSIS — I25.10 CORONARY ARTERY CALCIFICATION: ICD-10-CM

## 2024-09-12 DIAGNOSIS — Z13.89 SCREENING FOR DIABETIC PERIPHERAL NEUROPATHY: ICD-10-CM

## 2024-09-12 DIAGNOSIS — E11.9 TYPE 2 DIABETES MELLITUS WITHOUT COMPLICATION, WITH LONG-TERM CURRENT USE OF INSULIN (H): Primary | ICD-10-CM

## 2024-09-12 LAB
ERYTHROCYTE [DISTWIDTH] IN BLOOD BY AUTOMATED COUNT: 13 % (ref 10–15)
HBA1C MFR BLD: 7.4 % (ref 0–5.6)
HCT VFR BLD AUTO: 43.1 % (ref 40–53)
HGB BLD-MCNC: 13.7 G/DL (ref 13.3–17.7)
MCH RBC QN AUTO: 30 PG (ref 26.5–33)
MCHC RBC AUTO-ENTMCNC: 31.8 G/DL (ref 31.5–36.5)
MCV RBC AUTO: 95 FL (ref 78–100)
PLATELET # BLD AUTO: 159 10E3/UL (ref 150–450)
RBC # BLD AUTO: 4.56 10E6/UL (ref 4.4–5.9)
WBC # BLD AUTO: 7.1 10E3/UL (ref 4–11)

## 2024-09-12 PROCEDURE — 80061 LIPID PANEL: CPT | Performed by: INTERNAL MEDICINE

## 2024-09-12 PROCEDURE — 85027 COMPLETE CBC AUTOMATED: CPT | Performed by: INTERNAL MEDICINE

## 2024-09-12 PROCEDURE — 99214 OFFICE O/P EST MOD 30 MIN: CPT | Performed by: INTERNAL MEDICINE

## 2024-09-12 PROCEDURE — 83036 HEMOGLOBIN GLYCOSYLATED A1C: CPT | Performed by: INTERNAL MEDICINE

## 2024-09-12 PROCEDURE — 82570 ASSAY OF URINE CREATININE: CPT | Performed by: INTERNAL MEDICINE

## 2024-09-12 PROCEDURE — 82043 UR ALBUMIN QUANTITATIVE: CPT | Performed by: INTERNAL MEDICINE

## 2024-09-12 PROCEDURE — 80048 BASIC METABOLIC PNL TOTAL CA: CPT | Performed by: INTERNAL MEDICINE

## 2024-09-12 PROCEDURE — 36415 COLL VENOUS BLD VENIPUNCTURE: CPT | Performed by: INTERNAL MEDICINE

## 2024-09-12 PROCEDURE — 99207 PR FOOT EXAM NO CHARGE: CPT | Performed by: INTERNAL MEDICINE

## 2024-09-12 ASSESSMENT — PAIN SCALES - GENERAL: PAINLEVEL: NO PAIN (0)

## 2024-09-12 NOTE — PATIENT INSTRUCTIONS
You should get the flu shot, new COVID shot and RSV (Respiratory Syncytial Virus) vaccine at a pharmacy.

## 2024-09-12 NOTE — PROGRESS NOTES
"  Assessment & Plan     Type 2 diabetes mellitus without complication, with long-term current use of insulin (H)  I spent the majority of her extended visit today discussing diet interventions that might help him with his glycemic control and to help him lose weight  - CBC with platelets; Future  - Albumin Random Urine Quantitative with Creat Ratio; Future  - CBC with platelets  - Albumin Random Urine Quantitative with Creat Ratio    Atrial fibrillation, unspecified type (H)  Rate seems controlled, on anticoagulant for stroke prophylaxis    Hypertension, unspecified type  Well-controlled, continue current medications, check labs  - BASIC METABOLIC PANEL; Future  - BASIC METABOLIC PANEL    Hyperlipidemia LDL goal <70  On statin therapy, rechecking    Coronary artery calcification  Goal LDL less than 70 due to the presence of coronary calcification  - Lipid panel reflex to direct LDL Non-fasting; Future  - Lipid panel reflex to direct LDL Non-fasting    Screening for diabetic peripheral neuropathy    - FOOT EXAM  NO CHARGE [37432.114]          BMI  Estimated body mass index is 33.06 kg/m  as calculated from the following:    Height as of 3/12/24: 1.727 m (5' 8\").    Weight as of this encounter: 98.6 kg (217 lb 6.4 oz).   Weight management plan: Discussed healthy diet and exercise guidelines      FUTURE APPOINTMENTS:       - Follow-up for annual visit or as needed    Hernando Tiwari is a 73 year old, presenting for the following health issues:  Diabetes    HPI         Nice man with diabetes, atrial fibrillation, hypertension, hyperlipidemia, coronary artery calcification, neuropathy, sleep apnea, obesity    Here for follow-up of these conditions.    He had a number of questions regarding vaccines and his diet and exercise practices.    No new specific symptoms      Objective    /64 (BP Location: Right arm, Patient Position: Sitting, Cuff Size: Adult Regular)   Pulse 90   Temp 97.7  F (36.5  C) (Temporal)   " Resp 18   Wt 98.6 kg (217 lb 6.4 oz)   SpO2 95%   BMI 33.06 kg/m    Body mass index is 33.06 kg/m .  Physical Exam   GENERAL: alert and no distress  NECK: no adenopathy, no asymmetry, masses, or scars  RESP: lungs clear to auscultation - no rales, rhonchi or wheezes  CV: Heart with regular rate and rhythm.   ABDOMEN: soft, nontender, no hepatosplenomegaly, no masses and bowel sounds normal  MS: no gross musculoskeletal defects noted, no edema  NEURO: Normal strength and tone, mentation intact and speech normal  PSYCH: mentation appears normal, affect normal/bright  Diabetic foot exam: normal DP and PT pulses, no trophic changes or ulcerative lesions, and reduce sensation to 10 g monofilament symmetric and stocking distribution            Signed Electronically by: Gonzalez Akhtar MD

## 2024-09-13 LAB
ANION GAP SERPL CALCULATED.3IONS-SCNC: 11 MMOL/L (ref 7–15)
BUN SERPL-MCNC: 33.1 MG/DL (ref 8–23)
CALCIUM SERPL-MCNC: 9.6 MG/DL (ref 8.8–10.4)
CHLORIDE SERPL-SCNC: 105 MMOL/L (ref 98–107)
CHOLEST SERPL-MCNC: 121 MG/DL
CREAT SERPL-MCNC: 1.23 MG/DL (ref 0.67–1.17)
CREAT UR-MCNC: 61.7 MG/DL
EGFRCR SERPLBLD CKD-EPI 2021: 62 ML/MIN/1.73M2
FASTING STATUS PATIENT QL REPORTED: NO
FASTING STATUS PATIENT QL REPORTED: NO
GLUCOSE SERPL-MCNC: 145 MG/DL (ref 70–99)
HCO3 SERPL-SCNC: 25 MMOL/L (ref 22–29)
HDLC SERPL-MCNC: 33 MG/DL
LDLC SERPL CALC-MCNC: 44 MG/DL
MICROALBUMIN UR-MCNC: 24.3 MG/L
MICROALBUMIN/CREAT UR: 39.38 MG/G CR (ref 0–17)
NONHDLC SERPL-MCNC: 88 MG/DL
POTASSIUM SERPL-SCNC: 4.2 MMOL/L (ref 3.4–5.3)
SODIUM SERPL-SCNC: 141 MMOL/L (ref 135–145)
TRIGL SERPL-MCNC: 219 MG/DL

## 2024-09-13 NOTE — RESULT ENCOUNTER NOTE
"The following letter pertains to your most recent diagnostic tests:    -There is a trace amount of protein in the urine from diabetes.  However, the lisinopril and farxiga that you are taking are the best medications to make sure this problem does not worsen over time.      The metabolic panel shows stable kidney function and electrolytes.     -The cholesterol panels shows high triglycerides.  Reducing sugars and starches in the diet like we discussed yesterday can lower triglycerides.  Increasing physical activity can improve levels of \"good cholesterol\" HDL.      -Your hemoglobin A1c test which averages your blood sugars over the last 3 months returned at 7.4 which is at your goal of hemoglobin A1c at least less than 8.     -Your complete blood counts including your hemoglobin returned normal for you.           Bottom line:  Overall, these results look OK for you.  Improving your diet and increasing activity are the keys to lower triglycerides and improving \"good cholesterol\" HDL.  This will help your blood sugar control too.            Sincerely,    Dr. Akhtar"

## 2024-09-16 DIAGNOSIS — I10 ESSENTIAL HYPERTENSION: ICD-10-CM

## 2024-09-16 RX ORDER — HYDROCHLOROTHIAZIDE 12.5 MG/1
CAPSULE ORAL
Qty: 180 CAPSULE | Refills: 3 | Status: SHIPPED | OUTPATIENT
Start: 2024-09-16

## 2024-10-13 DIAGNOSIS — I25.10 CORONARY ARTERY CALCIFICATION: ICD-10-CM

## 2024-10-14 RX ORDER — ROSUVASTATIN CALCIUM 20 MG/1
20 TABLET, COATED ORAL DAILY
Qty: 90 TABLET | Refills: 0 | Status: SHIPPED | OUTPATIENT
Start: 2024-10-14 | End: 2024-10-30

## 2024-10-14 NOTE — TELEPHONE ENCOUNTER
Prescription approved per [facility] Refill Protocol.  Irene Ulloa RN  [facility]

## 2024-10-21 ENCOUNTER — OFFICE VISIT (OUTPATIENT)
Dept: DERMATOLOGY | Facility: CLINIC | Age: 74
End: 2024-10-21
Payer: COMMERCIAL

## 2024-10-21 DIAGNOSIS — L82.1 SEBORRHEIC KERATOSES: ICD-10-CM

## 2024-10-21 DIAGNOSIS — Z00.00 ENCOUNTER FOR MEDICARE ANNUAL WELLNESS EXAM: ICD-10-CM

## 2024-10-21 DIAGNOSIS — L81.4 LENTIGINES: Primary | ICD-10-CM

## 2024-10-21 DIAGNOSIS — L57.8 ACTINIC SKIN DAMAGE: ICD-10-CM

## 2024-10-21 DIAGNOSIS — D22.9 MULTIPLE BENIGN NEVI: ICD-10-CM

## 2024-10-21 PROCEDURE — 99203 OFFICE O/P NEW LOW 30 MIN: CPT | Performed by: STUDENT IN AN ORGANIZED HEALTH CARE EDUCATION/TRAINING PROGRAM

## 2024-10-21 NOTE — PROGRESS NOTES
[facility] Dermatology Note    Encounter Date: Oct 21, 2024    Dermatology Problem List:    ______________________________________    Impression/Plan:  Te was seen today for derm problem.    Diagnoses and all orders for this visit:    Lentigines  Actinic skin damage  - Reviewed the compounding benefits of incremental changes to sun protective clothing behaviors including increased frequency of sunscreen and sun protective clothing like broad brimmed hats and longsleeved UPF containing clothing    Encounter for Medicare annual wellness exam  -     Adult Dermatology  Referral    Seborrheic keratoses  Multiple benign nevi  - benign            Follow-up in 1 year .       Staff Involved:  Staff Only    Richi Keyes MD   of Dermatology  Department of Dermatology  [facility]      CC:   Chief Complaint   Patient presents with    Derm Problem     Skin check        History of Present Illness:  Mr. Te Yoder is a 74 year old male who presents as a new patient.    Pt presents today for concerns about spots on trunk and extremities. Lives alone, cannot see lesions on his back.     Labs:      Physical exam:  Vitals: There were no vitals taken for this visit.  GEN: well developed, well-nourished, in no acute distress, in a pleasant mood.     SKIN: Pope phototype 1  - Full skin, which includes the head/face, both arms, chest, back, abdomen,both legs, genitalia and/or groin buttocks, digits and/or nails, was examined.  - Stuck on brown papules on trunk and extremities   - Flat brown macules and patches in a sun exposed areas on face and extremities  - scattered brown papules on trunk and extremities   - No other lesions of concern on areas examined.     Past Medical History:   Past Medical History:   Diagnosis Date    Afferent pupillary defect     Anemia     Atrial fibrillation (H) 12/10/2014    EKG done at [facility] 12/10/14     B12 deficiency      Calculus of kidney     PSA followed by urology    CKD (chronic kidney disease), stage II     Exotropia     HTN (hypertension)     Hyperlipidaemia     Hyperlipidemia     Microalbuminuria     Morbid obesity (H)     Nonspecific abnormal unspecified cardiovascular function study     Optic atrophy, left eye     LE, mild    Optic disc pallor     LE    ROC (obstructive sleep apnea)     ROC on CPAP     Palpitations     Phrenic neuropathy     Type II or unspecified type diabetes mellitus without mention of complication, not stated as uncontrolled     retinopathy and nephropathy - followed by Dr. Arnold     Past Surgical History:   Procedure Laterality Date    COLONOSCOPY  2003    COLONOSCOPY  11/25/2013    Procedure: COLONOSCOPY;  COLONOSCOPY;  Surgeon: Uday Taylor MD;  Location:  GI       Social History:   reports that he has never smoked. He has never used smokeless tobacco. He reports current alcohol use. He reports that he does not use drugs.    Family History:  Family History   Problem Relation Age of Onset    Heart Disease Father         disease    Arthritis Mother     Cancer - colorectal No family hx of        Medications:  Current Outpatient Medications   Medication Sig Dispense Refill    acetaminophen (TYLENOL) 650 MG CR tablet Take 1,300 mg by mouth every 8 hours as needed for mild pain or fever      ammonium lactate (AMLACTIN) 12 % external cream APPLY TOPICALLY 2 TIMES DAILY AS NEEDED FOR DRY SKIN 385 g 0    Chromium Picolinate (CHROMIUM PICOLATE PO) Take 1 capsule by mouth daily      CINNAMON PO Take 2-3 capsules by mouth daily      Continuous Blood Gluc Sensor (DEXCOM G6 SENSOR) MISC Change every 10 days.      Continuous Blood Gluc Transmit (DEXCOM G6 TRANSMITTER) MISC       Cyanocobalamin (VITAMIN B-12 SL) Take 2 tablets by mouth daily Dose unknown      dapagliflozin (FARXIGA) 10 MG TABS tablet Take 10 mg by mouth daily      glimepiride (AMARYL) 4 MG tablet Take 8 mg by mouth daily       hydrochlorothiazide (MICROZIDE) 12.5 MG capsule TAKE TWO CAPSULES BY MOUTH DAILY 180 capsule 3    insulin lispro (HUMALOG KWIKPEN) 100 UNIT/ML (1 unit dial) KWIKPEN Inject Subcutaneous 3 times daily (before meals) According to sliding scale      lisinopril (ZESTRIL) 20 MG tablet Take 1 tablet (20 mg) by mouth 2 times daily 180 tablet 2    metFORMIN (GLUCOPHAGE) 1000 MG tablet Take 1 tablet (1,000 mg) by mouth 2 times daily (take with meals) 180 tablet 0    metoprolol succinate ER (TOPROL XL) 50 MG 24 hr tablet Take 1 tablet (50 mg) by mouth daily 90 tablet 3    rivaroxaban ANTICOAGULANT (XARELTO ANTICOAGULANT) 20 MG TABS tablet TAKE ONE TABLET BY MOUTH ONE TIME DAILY WITH DINNER 90 tablet 3    rosuvastatin (CRESTOR) 20 MG tablet Take 1 tablet (20 mg) by mouth daily 90 tablet 0    Semaglutide, 1 MG/DOSE, 4 MG/3ML SOPN Inject 2 mg Subcutaneous once a week      triamcinolone (KENALOG) 0.1 % external cream Apply topically 2 times daily To the leg area 80 g 1    ULTICARE 29G X 12.7MM insulin pen needle USE ONCE DAILY      UNABLE TO FIND Take 2 capsules by mouth daily MEDICATION NAME: Lymphatic Support 1000      VITAMIN D (CHOLECALCIFEROL) PO Take 1 tablet by mouth daily Dose unknown      ASPIRIN NOT PRESCRIBED, INTENTIONAL, Reported on 3/8/2017 (Patient not taking: Reported on 3/20/2024)       Allergies   Allergen Reactions    Simvastatin Other (See Comments)     Muscle aches, elevated CK levels

## 2024-10-21 NOTE — LETTER
10/21/2024      Te Yoder  [address] [facility] [address]      Dear Colleague,    Thank you for referring your patient, Te Yoder, to the [facility]. Please see a copy of my visit note below.    [facility] Dermatology Note    Encounter Date: Oct 21, 2024    Dermatology Problem List:    ______________________________________    Impression/Plan:  Te was seen today for derm problem.    Diagnoses and all orders for this visit:    Lentigines  Actinic skin damage  - Reviewed the compounding benefits of incremental changes to sun protective clothing behaviors including increased frequency of sunscreen and sun protective clothing like broad brimmed hats and longsleeved UPF containing clothing    Encounter for Medicare annual wellness exam  -     Adult Dermatology  Referral    Seborrheic keratoses  Multiple benign nevi  - benign            Follow-up in 1 year .       Staff Involved:  Staff Only    Richi Keyes MD   of Dermatology  Department of Dermatology  [facility]      CC:   Chief Complaint   Patient presents with     Derm Problem     Skin check        History of Present Illness:  Mr. Te Yoder is a 74 year old male who presents as a new patient.    Pt presents today for concerns about spots on trunk and extremities. Lives alone, cannot see lesions on his back.     Labs:      Physical exam:  Vitals: There were no vitals taken for this visit.  GEN: well developed, well-nourished, in no acute distress, in a pleasant mood.     SKIN: Pope phototype 1  - Full skin, which includes the head/face, both arms, chest, back, abdomen,both legs, genitalia and/or groin buttocks, digits and/or nails, was examined.  - Stuck on brown papules on trunk and extremities   - Flat brown macules and patches in a sun exposed areas on face and extremities  - scattered brown papules on trunk and  extremities   - No other lesions of concern on areas examined.     Past Medical History:   Past Medical History:   Diagnosis Date     Afferent pupillary defect      Anemia      Atrial fibrillation (H) 12/10/2014    EKG done at [facility] 12/10/14      B12 deficiency      Calculus of kidney     PSA followed by urology     CKD (chronic kidney disease), stage II      Exotropia      HTN (hypertension)      Hyperlipidaemia      Hyperlipidemia      Microalbuminuria      Morbid obesity (H)      Nonspecific abnormal unspecified cardiovascular function study      Optic atrophy, left eye     LE, mild     Optic disc pallor     LE     ROC (obstructive sleep apnea)      ROC on CPAP      Palpitations      Phrenic neuropathy      Type II or unspecified type diabetes mellitus without mention of complication, not stated as uncontrolled     retinopathy and nephropathy - followed by Dr. Arnold     Past Surgical History:   Procedure Laterality Date     COLONOSCOPY  2003     COLONOSCOPY  11/25/2013    Procedure: COLONOSCOPY;  COLONOSCOPY;  Surgeon: Uday Taylor MD;  Location:  GI       Social History:   reports that he has never smoked. He has never used smokeless tobacco. He reports current alcohol use. He reports that he does not use drugs.    Family History:  Family History   Problem Relation Age of Onset     Heart Disease Father         disease     Arthritis Mother      Cancer - colorectal No family hx of        Medications:  Current Outpatient Medications   Medication Sig Dispense Refill     acetaminophen (TYLENOL) 650 MG CR tablet Take 1,300 mg by mouth every 8 hours as needed for mild pain or fever       ammonium lactate (AMLACTIN) 12 % external cream APPLY TOPICALLY 2 TIMES DAILY AS NEEDED FOR DRY SKIN 385 g 0     Chromium Picolinate (CHROMIUM PICOLATE PO) Take 1 capsule by mouth daily       CINNAMON PO Take 2-3 capsules by mouth daily       Continuous Blood Gluc Sensor (DEXCOM G6 SENSOR) MISC Change every 10 days.        Continuous Blood Gluc Transmit (DEXCOM G6 TRANSMITTER) MISC        Cyanocobalamin (VITAMIN B-12 SL) Take 2 tablets by mouth daily Dose unknown       dapagliflozin (FARXIGA) 10 MG TABS tablet Take 10 mg by mouth daily       glimepiride (AMARYL) 4 MG tablet Take 8 mg by mouth daily       hydrochlorothiazide (MICROZIDE) 12.5 MG capsule TAKE TWO CAPSULES BY MOUTH DAILY 180 capsule 3     insulin lispro (HUMALOG KWIKPEN) 100 UNIT/ML (1 unit dial) KWIKPEN Inject Subcutaneous 3 times daily (before meals) According to sliding scale       lisinopril (ZESTRIL) 20 MG tablet Take 1 tablet (20 mg) by mouth 2 times daily 180 tablet 2     metFORMIN (GLUCOPHAGE) 1000 MG tablet Take 1 tablet (1,000 mg) by mouth 2 times daily (take with meals) 180 tablet 0     metoprolol succinate ER (TOPROL XL) 50 MG 24 hr tablet Take 1 tablet (50 mg) by mouth daily 90 tablet 3     rivaroxaban ANTICOAGULANT (XARELTO ANTICOAGULANT) 20 MG TABS tablet TAKE ONE TABLET BY MOUTH ONE TIME DAILY WITH DINNER 90 tablet 3     rosuvastatin (CRESTOR) 20 MG tablet Take 1 tablet (20 mg) by mouth daily 90 tablet 0     Semaglutide, 1 MG/DOSE, 4 MG/3ML SOPN Inject 2 mg Subcutaneous once a week       triamcinolone (KENALOG) 0.1 % external cream Apply topically 2 times daily To the leg area 80 g 1     ULTICARE 29G X 12.7MM insulin pen needle USE ONCE DAILY       UNABLE TO FIND Take 2 capsules by mouth daily MEDICATION NAME: Lymphatic Support 1000       VITAMIN D (CHOLECALCIFEROL) PO Take 1 tablet by mouth daily Dose unknown       ASPIRIN NOT PRESCRIBED, INTENTIONAL, Reported on 3/8/2017 (Patient not taking: Reported on 3/20/2024)       Allergies   Allergen Reactions     Simvastatin Other (See Comments)     Muscle aches, elevated CK levels               Again, thank you for allowing me to participate in the care of your patient.        Sincerely,        Richi Keyes MD

## 2024-10-30 ENCOUNTER — OFFICE VISIT (OUTPATIENT)
Dept: OTHER | Facility: CLINIC | Age: 74
End: 2024-10-30
Attending: INTERNAL MEDICINE
Payer: COMMERCIAL

## 2024-10-30 VITALS
BODY MASS INDEX: 33.3 KG/M2 | HEART RATE: 77 BPM | SYSTOLIC BLOOD PRESSURE: 115 MMHG | WEIGHT: 219 LBS | DIASTOLIC BLOOD PRESSURE: 73 MMHG | OXYGEN SATURATION: 98 %

## 2024-10-30 DIAGNOSIS — I87.2 LYMPHEDEMA DUE TO VENOUS INSUFFICIENCY: Primary | ICD-10-CM

## 2024-10-30 DIAGNOSIS — I25.10 CORONARY ARTERY CALCIFICATION: ICD-10-CM

## 2024-10-30 DIAGNOSIS — I87.2 VENOUS STASIS DERMATITIS OF BOTH LOWER EXTREMITIES: ICD-10-CM

## 2024-10-30 DIAGNOSIS — E11.42 TYPE 2 DIABETES MELLITUS WITH DIABETIC POLYNEUROPATHY, WITH LONG-TERM CURRENT USE OF INSULIN (H): ICD-10-CM

## 2024-10-30 DIAGNOSIS — I48.91 ATRIAL FIBRILLATION, UNSPECIFIED TYPE (H): ICD-10-CM

## 2024-10-30 DIAGNOSIS — I87.303 VENOUS HYPERTENSION OF BOTH LOWER EXTREMITIES: ICD-10-CM

## 2024-10-30 DIAGNOSIS — I48.20 CHRONIC ATRIAL FIBRILLATION (H): ICD-10-CM

## 2024-10-30 DIAGNOSIS — I89.0 LYMPHEDEMA DUE TO VENOUS INSUFFICIENCY: Primary | ICD-10-CM

## 2024-10-30 DIAGNOSIS — Z79.4 TYPE 2 DIABETES MELLITUS WITH DIABETIC POLYNEUROPATHY, WITH LONG-TERM CURRENT USE OF INSULIN (H): ICD-10-CM

## 2024-10-30 DIAGNOSIS — I83.893 VARICOSE VEINS OF BILATERAL LOWER EXTREMITIES WITH OTHER COMPLICATIONS: ICD-10-CM

## 2024-10-30 PROBLEM — E11.9 DIABETES MELLITUS, TYPE 2 (H): Status: ACTIVE | Noted: 2024-10-30

## 2024-10-30 PROCEDURE — G0463 HOSPITAL OUTPT CLINIC VISIT: HCPCS | Performed by: INTERNAL MEDICINE

## 2024-10-30 PROCEDURE — G2211 COMPLEX E/M VISIT ADD ON: HCPCS | Performed by: INTERNAL MEDICINE

## 2024-10-30 PROCEDURE — 99215 OFFICE O/P EST HI 40 MIN: CPT | Performed by: INTERNAL MEDICINE

## 2024-10-30 RX ORDER — ROSUVASTATIN CALCIUM 20 MG/1
20 TABLET, COATED ORAL DAILY
Qty: 90 TABLET | Refills: 0 | Status: SHIPPED | OUTPATIENT
Start: 2024-10-30

## 2024-10-30 RX ORDER — TRIAMCINOLONE ACETONIDE 1 MG/G
CREAM TOPICAL 2 TIMES DAILY
Qty: 80 G | Refills: 3 | Status: SHIPPED | OUTPATIENT
Start: 2024-10-30

## 2024-10-30 NOTE — PROGRESS NOTES
[facility] VASCULAR MEDICINE  FOLLOW UP VISIT      PRIMARY HEALTH CARE PROVIDER:  Gonzalez Akhtar MD      REASON FOR VISIT:    Follow up visit  Known Venous insufficiency   Stasis dermatitis   Med refills   Taking vein formula 1000   HTN, HLP Type 2 DM     HPI: Te Yoder is a 74 year old year old very pleasant male with multiple medical issues including the chronic atrial fibrillation currently on anticoagulation, nephrolithiasis, CKD, hypertension, hyperlipidemia, morbid obesity BMI greater than 33, type 2 diabetes mellitus, A1c 7.4  gives a history of bilateral lower extremity discoloration for many years and also mild swelling.  He denies any intermittent claudication, rest pain or foot ulcers or leg ulcers.  No previous history of DVT or PE.  He underwent JAVIER recently with results as delineated below noncompressible vessels but improved with exercise and he has a Bi/triphasic waveforms        PAST MEDICAL HISTORY  Past Medical History:   Diagnosis Date    Afferent pupillary defect     Anemia     Atrial fibrillation (H) 12/10/2014    EKG done at PMD 12/10/14     B12 deficiency     Calculus of kidney     PSA followed by urology    CKD (chronic kidney disease), stage II     Exotropia     HTN (hypertension)     Hyperlipidaemia     Hyperlipidemia     Microalbuminuria     Morbid obesity (H)     Nonspecific abnormal unspecified cardiovascular function study     Optic atrophy, left eye     LE, mild    Optic disc pallor     LE    ROC (obstructive sleep apnea)     ROC on CPAP     Palpitations     Phrenic neuropathy     Type II or unspecified type diabetes mellitus without mention of complication, not stated as uncontrolled     retinopathy and nephropathy - followed by Dr. Arnold       CURRENT MEDICATIONS  Current Outpatient Medications   Medication Sig Dispense Refill    acetaminophen (TYLENOL) 650 MG CR tablet Take 1,300 mg by mouth every 8 hours as needed for mild pain or fever       ammonium lactate (AMLACTIN) 12 % external cream APPLY TOPICALLY 2 TIMES DAILY AS NEEDED FOR DRY SKIN 385 g 0    ASPIRIN NOT PRESCRIBED, INTENTIONAL, Reported on 3/8/2017      Chromium Picolinate (CHROMIUM PICOLATE PO) Take 1 capsule by mouth daily      CINNAMON PO Take 2-3 capsules by mouth daily      Continuous Blood Gluc Sensor (DEXCOM G6 SENSOR) MISC Change every 10 days.      Continuous Blood Gluc Transmit (DEXCOM G6 TRANSMITTER) MISC       Cyanocobalamin (VITAMIN B-12 SL) Take 2 tablets by mouth daily Dose unknown      dapagliflozin (FARXIGA) 10 MG TABS tablet Take 10 mg by mouth daily      glimepiride (AMARYL) 4 MG tablet Take 8 mg by mouth daily      hydrochlorothiazide (MICROZIDE) 12.5 MG capsule TAKE TWO CAPSULES BY MOUTH DAILY 180 capsule 3    insulin lispro (HUMALOG KWIKPEN) 100 UNIT/ML (1 unit dial) KWIKPEN Inject Subcutaneous 3 times daily (before meals) According to sliding scale      lisinopril (ZESTRIL) 20 MG tablet Take 1 tablet (20 mg) by mouth 2 times daily 180 tablet 2    metFORMIN (GLUCOPHAGE) 1000 MG tablet Take 1 tablet (1,000 mg) by mouth 2 times daily (take with meals) 180 tablet 0    metoprolol succinate ER (TOPROL XL) 50 MG 24 hr tablet Take 1 tablet (50 mg) by mouth daily 90 tablet 3    rivaroxaban ANTICOAGULANT (XARELTO ANTICOAGULANT) 20 MG TABS tablet TAKE ONE TABLET BY MOUTH ONE TIME DAILY WITH DINNER 90 tablet 3    rosuvastatin (CRESTOR) 20 MG tablet Take 1 tablet (20 mg) by mouth daily 90 tablet 0    Semaglutide, 1 MG/DOSE, 4 MG/3ML SOPN Inject 2 mg Subcutaneous once a week      triamcinolone (KENALOG) 0.1 % external cream Apply topically 2 times daily To the leg area 80 g 1    ULTICARE 29G X 12.7MM insulin pen needle USE ONCE DAILY      UNABLE TO FIND Take 2 capsules by mouth daily MEDICATION NAME: Lymphatic Support 1000      VITAMIN D (CHOLECALCIFEROL) PO Take 1 tablet by mouth daily Dose unknown       No current facility-administered medications for this visit.       PAST SURGICAL  HISTORY:  Past Surgical History:   Procedure Laterality Date    COLONOSCOPY  2003    COLONOSCOPY  11/25/2013    Procedure: COLONOSCOPY;  COLONOSCOPY;  Surgeon: Uday Taylor MD;  Location:  GI       ALLERGIES     Allergies   Allergen Reactions    Simvastatin Other (See Comments)     Muscle aches, elevated CK levels       FAMILY HISTORY  Family History   Problem Relation Age of Onset    Heart Disease Father         disease    Arthritis Mother     Cancer - colorectal No family hx of        VASCULAR FAMILY HISTORY  1st order relative with atherosclerotic PAD: No  1st order relative with AAA: No  Family history of Familial Hyperlipidemia No  Family History of Hypercoagulable state:No    VASCULAR RISK FACTORS  1. Diabetes:Yes controlled  2. Smoking: has never smoked.  3. HTN: controlled  4.Hyperlipidemia: Yes - controlled      SOCIAL HISTORY  Social History     Socioeconomic History    Marital status: Single     Spouse name: Not on file    Number of children: 0    Years of education: BA    Highest education level: Not on file   Occupational History    Occupation: retired office   Tobacco Use    Smoking status: Never    Smokeless tobacco: Never   Substance and Sexual Activity    Alcohol use: Yes     Alcohol/week: 0.0 standard drinks of alcohol     Comment: 4 drinks per week    Drug use: No    Sexual activity: Yes     Partners: Female   Other Topics Concern    Parent/sibling w/ CABG, MI or angioplasty before 65F 55M? Not Asked   Social History Narrative    Not on file     Social Drivers of Health     Financial Resource Strain: Low Risk  (3/5/2024)    Financial Resource Strain     Within the past 12 months, have you or your family members you live with been unable to get utilities (heat, electricity) when it was really needed?: No   Food Insecurity: Low Risk  (3/5/2024)    Food Insecurity     Within the past 12 months, did you worry that your food would run out before you got money to buy more?: No     Within the  past 12 months, did the food you bought just not last and you didn t have money to get more?: No   Transportation Needs: Low Risk  (3/5/2024)    Transportation Needs     Within the past 12 months, has lack of transportation kept you from medical appointments, getting your medicines, non-medical meetings or appointments, work, or from getting things that you need?: No   Physical Activity: Insufficiently Active (3/5/2024)    Exercise Vital Sign     Days of Exercise per Week: 2 days     Minutes of Exercise per Session: 10 min   Stress: No Stress Concern Present (3/5/2024)    [employer] [place] [employer] - Occupational Stress Questionnaire     Feeling of Stress : Not at all   Social Connections: Unknown (3/5/2024)    Social Connection and Isolation Panel [NHANES]     Frequency of Communication with Friends and Family: Not on file     Frequency of Social Gatherings with Friends and Family: Three times a week     Attends [employer] Services: Not on file     Active Member of Clubs or Organizations: Not on file     Attends Club or Organization Meetings: Not on file     Marital Status: Not on file   Interpersonal Safety: Low Risk  (9/12/2024)    Interpersonal Safety     Do you feel physically and emotionally safe where you currently live?: Yes     Within the past 12 months, have you been hit, slapped, kicked or otherwise physically hurt by someone?: No     Within the past 12 months, have you been humiliated or emotionally abused in other ways by your partner or ex-partner?: No   Housing Stability: Low Risk  (3/5/2024)    Housing Stability     Do you have housing? : Yes     Are you worried about losing your housing?: No       ROS:   General: No change in weight, sleep or appetite.  Normal energy.  No fever or chills  Eyes: Negative for vision changes or eye problems  ENT: No problems with ears, nose or throat.  No difficulty swallowing.  Resp: No coughing, wheezing or shortness of breath  CV: No chest pains or  palpitations  GI: No nausea, vomiting,  heartburn, abdominal pain, diarrhea, constipation or change in bowel habits  : No urinary frequency or dysuria, bladder or kidney problems  Musculoskeletal: No significant muscle or joint pains  Neurologic: No headaches, numbness, tingling, weakness, problems with balance or coordination  Psychiatric: No problems with anxiety, depression or mental health  Heme/immune/allergy: No history of bleeding or clotting problems or anemia.  No allergies or immune system problems  Endocrine: No history of thyroid disease, diabetes or other endocrine disorders  Skin: No rashes,worrisome lesions or skin problems  Vascular:  Vascular:  Discoloration of both legs for many years  No claudication symptoms  No foot ulcers or leg ulcers  No history of a DVT or PE    EXAM:  /73 (BP Location: Right arm, Patient Position: Sitting, Cuff Size: Adult Regular)   Pulse 77   Wt 219 lb (99.3 kg)   SpO2 98%   BMI 33.30 kg/m    In general, the patient is a pleasant male in no apparent distress.    HEENT: NC/AT.  PERRLA.  EOMI.  Sclerae white, not injected.  Nares clear.  Pharynx without erythema or exudate.  Dentition intact.    Neck: No adenopathy.  No thyromegaly. Carotids +2/2 bilaterally without bruits.  No jugular venous distension.   Heart: RRR. Normal S1, S2 splits physiologically. No murmur, rub, click, or gallop. The PMI is in the 5th ICS in the midclavicular line. There is no heave.    Lungs: CTA.  No ronchi, wheezes, rales.  No dullness to percussion.   Abdomen: Soft, nontender, nondistended. No organomegaly. No AAA.  No bruits.   Extremities: Vascular:  Bilateral lower extremity leg edema consistent with secondary lymphedema due to venous insufficiency  Venous stasis dermatitis in both lower extremities  Good palpable and dopplerable peripheral pulses  Bilateral lower extremity varicose veins CEAP CVI  No foot or leg ulcers         Labs:  LIPID RESULTS:  Lab Results   Component Value  Date    CHOL 121 09/12/2024    CHOL 146 12/29/2020    HDL 33 (L) 09/12/2024    HDL 34 (L) 12/29/2020    LDL 44 09/12/2024    LDL 89 12/29/2020    TRIG 219 (H) 09/12/2024    TRIG 291 (A) 09/15/2021    TRIG 114 12/29/2020    CHOLHDLRATIO 3.8 01/30/2015       LIVER ENZYME RESULTS:  Lab Results   Component Value Date    AST 12 11/11/2021    AST 20 06/01/2021    ALT 23 11/11/2021    ALT 27 06/01/2021       CBC RESULTS:  Lab Results   Component Value Date    WBC 7.1 09/12/2024    WBC 7.7 06/01/2021    RBC 4.56 09/12/2024    RBC 4.86 06/01/2021    HGB 13.7 09/12/2024    HGB 15.1 06/01/2021    HCT 43.1 09/12/2024    HCT 45.7 06/01/2021    MCV 95 09/12/2024    MCV 94 06/01/2021    MCH 30.0 09/12/2024    MCH 31.1 06/01/2021    MCHC 31.8 09/12/2024    MCHC 33.0 06/01/2021    RDW 13.0 09/12/2024    RDW 14.0 06/01/2021     09/12/2024     06/01/2021       BMP RESULTS:  Lab Results   Component Value Date     09/12/2024     06/01/2021    POTASSIUM 4.2 09/12/2024    POTASSIUM 4.0 08/15/2022    POTASSIUM 3.9 06/01/2021    CHLORIDE 105 09/12/2024    CHLORIDE 105 08/15/2022    CHLORIDE 105 06/01/2021    CO2 25 09/12/2024    CO2 27 08/15/2022    CO2 28 06/01/2021    ANIONGAP 11 09/12/2024    ANIONGAP 6 08/15/2022    ANIONGAP 6 06/01/2021     (H) 09/12/2024     (H) 08/15/2022     (H) 06/01/2021    BUN 33.1 (H) 09/12/2024    BUN 24 08/15/2022    BUN 23 06/01/2021    CR 1.23 (H) 09/12/2024    CR 1.19 06/01/2021    GFRESTIMATED 62 09/12/2024    GFRESTIMATED 61 06/01/2021    GFRESTBLACK 71 06/01/2021    LEIGH 9.6 09/12/2024    LEIGH 9.2 06/01/2021        A1C RESULTS:  Lab Results   Component Value Date    A1C 7.4 (H) 09/12/2024    A1C 8.6 (H) 06/01/2021       THYROID RESULTS:  Lab Results   Component Value Date    TSH 3.28 06/01/2021       Procedures:     ULTRASOUND ANKLE-BRACHIAL INDEX DOPPLER WITH EXERCISE BILATERAL    9/19/2023 2:51 PM      HISTORY: Claudication (H).     COMPARISON: 3/8/2017      FINDINGS:  Right JAVIER:   PT: 1.17.  DP: 1.24.     Left JAVIER:   PT: 1.13.  DP: 1.12.      Waveforms: Multiphasic bilaterally.     Exercise: The patient was exercised on a treadmill at 1.5 mph at a 10%  incline for 5 minutes total. Minor discomfort at the left ankle that  patient stated was from his shoe.     Right exercise JAVIER: Noncompressible.  Left exercise JAVIER: 1.38.                                                                      IMPRESSION:   1. Right resting JAVIER is normal without evidence of arterial  insufficiency, and post exercise JAVIER cannot be calculated secondary to  noncompressible vessels.  2. Left resting and exercise ABIs are normal without evidence of  arterial insufficiency.     JAVIER CRITERIA:  >1.4 NC  0.95-1.4 Normal  0.90 - 0.94 Mild  0.5 - 0.89 Moderate  0.2 - 0.49 Severe  <0.2 Critical     HAYDER GARCIA, DO       BILATERAL Venous Insufficiency Ultrasound (Date: 11/08/23)    BILATERAL Lower Extremity          Indication:  Surveillance Bilateral Symptomatic Varicose Veins, Pain, Swelling and Discolor.      Previous: None     Patient History: Swelling and Stasis     Presenting Symptoms:  Swelling, Varicose Veins, Pain, and Stasis     Technique:   Supine and Upright Ultrasound of the Deep and Superficial Veins with Valsalva and Compression Augmentation Maneuvers. Duplex Imaging is performed utilizing gray-scale, Two-dimensional images, color-flow imaging, Doppler waveform analysis, and Spectral doppler imaging done with provacative maneuvers.      Incompetency Criteria:  Deep vein reflux reported when greater than 1000 msec flow reversal. Superficial vein reflux reported when equal to or greater than 600 msec flow reversal.  vein reflux reported as greater than 350 msec flow reversal.      Right  Leg Deep Veins    CFV SFJ PFV PROX FV   PROX FV MID FV DIST POP V. PERON.   V. PTV'S   Compressibility  (FC,PC,NC) FC FC FC FC FC FC FC FC FC   Reflux +   - - - - -   -         Right Leg  Saphenous Veins  Location SFJ PROX THIGH KNEE MID CALF SPJ PROX CALF MID CALF   RT GSV (mm) 5.3 5.3 5.2 3.4         Reflux - - - -         RT SSV (mm)         2.9 3.8 4.0   Reflux         - - +         Left Leg Deep Veins    CFV SFJ PFV PROX SFV PROX SFV MID SFV DIST POP V. PERON. V. PTV'S   Compressibility  (FC,PC,NC) FC FC FC FC FC FC FC FC FC   Reflux -   - - - - -   -         Lt Leg Saphenous Veins   Location SFJ PROX THIGH KNEE MID CALF SPJ PROX CALF MID CALF   LT GSV (mm) 8.4 5.3 4.9 4.1         Reflux - - + -         LT SSV (mm)         3.3 3.9 3.2   Reflux         - - -         Comments: No incompetent  veins visualized.      Impression:       Right Deep Vein Findings: Patent deep venous system with no evidence of DVT and only isolated common femoral vein reflux. The rest of the deep veins are competent.     Left Deep Vein Findings: Patent deep venous system with no evidence of DVT and without deep venous reflux.     Superficial Vein Findings:      Right Greater Saphenous vein: Patent Greater Saphenous Vein without evidence of reflux.     Right Small Saphenous Vein: Patent Small Saphenous vein with Reflux noted in the mid calf with a Maximum diameter of 4 mm  .     Left Greater Saphenous Vein: Patent Greater Saphenous vein with Reflux noted isolated to the knee with a Maximum diameter of 4.9 mm in the refluxing segment. Max diameter is 8.4 at the SFJ and 5.3 in the proximal thigh within the competent segments .     Left Small Saphenous Vein: Patent Small Saphenous Vein without evidence of reflux.     Perforating and Accessory Veins: None     Reference:     Compressibility: FC= Fully compressible, PC= Partially compressible, NC= Non-compressible, NV= Not Visualized     Reflux: (+) Incompetent  (-) Competent, (NV) = Not Visualized     Interpretation criteria:          Duration of Retrograde flow (milliseconds)  Category Deep Veins Superficial Veins  veins   Competent < 1000ms < 500ms <  350ms   Incompetent > 1000ms > 500ms > 350ms             Assessment and Plan:   1. Varicose veins of bilateral lower extremities with other complications    2. Venous stasis dermatitis of both lower extremities    3. Venous hypertension of both lower extremities    4. secondary  Lymphedema due to venous insufficiency    5. obesity with serious comorbidity and body mass index (BMI) of 33.3    6. ROC (obstructive sleep apnea)    7. Type 2 diabetes mellitus with diabetic polyneuropathy, with long-term current use of insulin (H)    8. Chronic atrial fibrillation (H)    This is a very pleasant 74-year-old male with multiple medical issues seen for evaluation and management of vascular causes of discoloration of the legs and abnormal JAVIER.  Reviewed his recent JAVIER as delineated above and no clinical evidence of severe arterial insufficiency.  He has no rest pain, nocturnal pain or foot ulcers or leg ulcers.  His main issue is chronic venous insufficiency with venous hypertension and stasis dermatitis and he is obese.  No recent venous competency studies and no previous venous ablation     I had a lengthy discussion with the patient and I have given education sheet on varicose veins/vein disorders    At present my recommendations,  Compression stockings 20 to 30 mmHg knee-high during the daytime and elevate the legs  Follow up with  edema therapist for compression, local care, possible Unna boot, edema wear or wraps etc.  Treat with triamcinolone 0.1% cream apply to the legs twice a day  Suggested vein formula 1000 take  1 pill a day.  Information and prescription given  Lose weight  Elevate the legs  Follow-up in 1 year or sooner if any problems    40 minutes spent on the date of the encounter doing chart review, review of previous evaluation, imaging studies, labs, history, exam, documentation and addressed above-mentioned issues.  AVS with written instructions given  He had a lot of questions all of them were  answered      The longitudinal care of plan for the above diagnoses was addressed during this visit. Due to added complexity of care, we will continue to supprt Te TIFFANIE Yoder and the subsequent management of this/these conditions and with ongoing continuity of care for this/these conditions.      Copy of this note to primary care physician    Pablo Enrique MD,FAHA,FSVM,FNLA, FACP  Vascular Medicine  Clinical Hypertension Specialist   Clinical Lipidologist

## 2024-10-30 NOTE — PATIENT INSTRUCTIONS
Use compression, wraps and elevate legs     Tight control of diabetes     Use triamcinolone cream twice a day     Refilled medications     Take vein formula 1000 one pill daily     Follow up in a year

## 2024-10-30 NOTE — PROGRESS NOTES
[facility]        Patient is here for a follow up.    Pt is currently taking Aspirin and Statin.    /73 (BP Location: Right arm, Patient Position: Sitting, Cuff Size: Adult Regular)   Pulse 77   Wt 219 lb (99.3 kg)   SpO2 98%   BMI 33.30 kg/m      The provider has been notified that the patient has no concerns.     Questions patient would like addressed today are: N/A.    Refills are needed: No    Has homecare services and agency name:  Melina Ortega MA

## 2024-12-09 ENCOUNTER — NURSE TRIAGE (OUTPATIENT)
Dept: FAMILY MEDICINE | Facility: CLINIC | Age: 74
End: 2024-12-09
Payer: COMMERCIAL

## 2024-12-09 NOTE — TELEPHONE ENCOUNTER
"Nurse Triage SBAR    Is this a 2nd Level Triage? YES, LICENSED PRACTITIONER REVIEW IS REQUIRED  Te wants a Covid test. Having some cold/Covid symptoms. Patient wants to go to [facility].     HANSA Lee  [facility] Triage       Reason for Disposition   Patient wants to be seen    Additional Information   Negative: SEVERE difficulty breathing (e.g., struggling for each breath, speaks in single words)   Negative: Sounds like a life-threatening emergency to the triager   Negative: Throat culture results, call about   Negative: Productive cough is main symptom   Negative: Runny nose is main symptom   Negative: Drooling or spitting out saliva (because can't swallow)   Negative: Unable to open mouth completely   Negative: Drinking very little and has signs of dehydration (e.g., no urine > 12 hours, very dry mouth, very lightheaded)   Negative: Patient sounds very sick or weak to the triager   Negative: Widespread rash (especially chest and abdomen)   Negative: Diabetes mellitus or weak immune system (e.g., HIV positive, cancer chemo, splenectomy, organ transplant, chronic steroids)   Negative: History of rheumatic fever   Negative: Pus on tonsils (back of throat) and swollen neck lymph nodes ('glands')   Negative: Earache also present   Negative: Difficulty breathing (per caller) but not severe   Negative: Fever > 103 F (39.4 C)   Negative: Refuses to drink anything for > 12 hours   Negative: SEVERE sore throat pain    Answer Assessment - Initial Assessment Questions  1. ONSET: \"When did the throat start hurting?\" (Hours or days ago)       Last week  2. SEVERITY: \"How bad is the sore throat?\" (Scale 1-10; mild, moderate or severe)      gone  3. STREP EXPOSURE: \"Has there been any exposure to strep within the past week?\" If Yes, ask: \"What type of contact occurred?\"       no  4.  VIRAL SYMPTOMS: \"Are there any symptoms of a cold, such as a runny nose, cough, hoarse voice or red eyes?\"       Hoarse voice runny nose   5. " "FEVER: \"Do you have a fever?\" If Yes, ask: \"What is your temperature, how was it measured, and when did it start?\"      no  6. PUS ON THE TONSILS: \"Is there pus on the tonsils in the back of your throat?\"      no  7. OTHER SYMPTOMS: \"Do you have any other symptoms?\" (e.g., difficulty breathing, headache, rash)      no  8. PREGNANCY: \"Is there any chance you are pregnant?\" \"When was your last menstrual period?\"  no    Protocols used: Sore Throat-A-OH    "

## 2024-12-23 ENCOUNTER — TRANSFERRED RECORDS (OUTPATIENT)
Dept: HEALTH INFORMATION MANAGEMENT | Facility: CLINIC | Age: 74
End: 2024-12-23
Payer: COMMERCIAL

## 2024-12-24 ENCOUNTER — OFFICE VISIT (OUTPATIENT)
Dept: PHARMACY | Facility: CLINIC | Age: 74
End: 2024-12-24
Payer: COMMERCIAL

## 2024-12-24 VITALS — DIASTOLIC BLOOD PRESSURE: 62 MMHG | BODY MASS INDEX: 33.3 KG/M2 | WEIGHT: 219 LBS | SYSTOLIC BLOOD PRESSURE: 116 MMHG

## 2024-12-24 DIAGNOSIS — R21 RASH: ICD-10-CM

## 2024-12-24 DIAGNOSIS — I48.91 ATRIAL FIBRILLATION, UNSPECIFIED TYPE (H): ICD-10-CM

## 2024-12-24 DIAGNOSIS — Z78.9 TAKES DIETARY SUPPLEMENTS: ICD-10-CM

## 2024-12-24 DIAGNOSIS — R52 PAIN: ICD-10-CM

## 2024-12-24 DIAGNOSIS — E11.21 TYPE 2 DIABETES MELLITUS WITH DIABETIC NEPHROPATHY, WITH LONG-TERM CURRENT USE OF INSULIN (H): Primary | ICD-10-CM

## 2024-12-24 DIAGNOSIS — I10 PRIMARY HYPERTENSION: ICD-10-CM

## 2024-12-24 DIAGNOSIS — E78.5 HYPERLIPIDEMIA LDL GOAL <100: ICD-10-CM

## 2024-12-24 DIAGNOSIS — Z79.4 TYPE 2 DIABETES MELLITUS WITH DIABETIC NEPHROPATHY, WITH LONG-TERM CURRENT USE OF INSULIN (H): Primary | ICD-10-CM

## 2024-12-24 PROCEDURE — 99606 MTMS BY PHARM EST 15 MIN: CPT | Performed by: PHARMACIST

## 2024-12-24 PROCEDURE — 99607 MTMS BY PHARM ADDL 15 MIN: CPT | Performed by: PHARMACIST

## 2024-12-24 RX ORDER — SEMAGLUTIDE 2.68 MG/ML
2 INJECTION, SOLUTION SUBCUTANEOUS
COMMUNITY
Start: 2024-12-11

## 2024-12-24 NOTE — PATIENT INSTRUCTIONS
"Recommendations from today's MTM visit:                                                    MTM (medication therapy management) is a service provided by a clinical pharmacist designed to help you get the most of out of your medicines.   Today we reviewed what your medicines are for, how to know if they are working, that your medicines are safe and how to make your medicine regimen as easy as possible.      Continue current medication regimen.     Follow-up: Return in about 6 months (around 6/24/2025) for Follow-up Medication Review.    It was great speaking with you today.  I value your experience and would be very thankful for your time in providing feedback in our clinic survey. In the next few days, you may receive an email or text message from [facility] with a link to a survey related to your  clinical pharmacist.\"     To schedule another MTM appointment, please call the clinic directly or you may call the MTM scheduling line at [phone] or toll-free at [phone].     My Clinical Pharmacist's contact information:                                                      Please feel free to contact me with any questions or concerns you have.      Carlotta Ovalle PharmD  Medication Therapy Management Pharmacy Resident  [facility]  [phone]    Tosin Gillette PharmD, [facility]  Medication Therapy Management Provider  [facility]  [phone]     "

## 2025-03-22 ENCOUNTER — HEALTH MAINTENANCE LETTER (OUTPATIENT)
Age: 75
End: 2025-03-22

## 2025-03-24 ENCOUNTER — OFFICE VISIT (OUTPATIENT)
Dept: FAMILY MEDICINE | Facility: CLINIC | Age: 75
End: 2025-03-24
Payer: COMMERCIAL

## 2025-03-24 VITALS
HEIGHT: 66 IN | BODY MASS INDEX: 35.63 KG/M2 | TEMPERATURE: 98.1 F | WEIGHT: 221.7 LBS | HEART RATE: 88 BPM | RESPIRATION RATE: 18 BRPM | SYSTOLIC BLOOD PRESSURE: 129 MMHG | OXYGEN SATURATION: 98 % | DIASTOLIC BLOOD PRESSURE: 85 MMHG

## 2025-03-24 DIAGNOSIS — E11.42 TYPE 2 DIABETES MELLITUS WITH DIABETIC POLYNEUROPATHY, WITH LONG-TERM CURRENT USE OF INSULIN (H): ICD-10-CM

## 2025-03-24 DIAGNOSIS — E66.812 CLASS 2 SEVERE OBESITY WITH BODY MASS INDEX (BMI) OF 35 TO 39.9 WITH SERIOUS COMORBIDITY (H): ICD-10-CM

## 2025-03-24 DIAGNOSIS — Z79.899 MEDICATION MANAGEMENT: ICD-10-CM

## 2025-03-24 DIAGNOSIS — I10 BENIGN ESSENTIAL HYPERTENSION: ICD-10-CM

## 2025-03-24 DIAGNOSIS — Z12.5 PROSTATE CANCER SCREENING: ICD-10-CM

## 2025-03-24 DIAGNOSIS — M79.672 LEFT FOOT PAIN: ICD-10-CM

## 2025-03-24 DIAGNOSIS — E66.01 CLASS 2 SEVERE OBESITY WITH BODY MASS INDEX (BMI) OF 35 TO 39.9 WITH SERIOUS COMORBIDITY (H): ICD-10-CM

## 2025-03-24 DIAGNOSIS — E78.5 HYPERLIPIDEMIA LDL GOAL <70: ICD-10-CM

## 2025-03-24 DIAGNOSIS — Z79.4 TYPE 2 DIABETES MELLITUS WITH DIABETIC POLYNEUROPATHY, WITH LONG-TERM CURRENT USE OF INSULIN (H): ICD-10-CM

## 2025-03-24 DIAGNOSIS — I48.91 ATRIAL FIBRILLATION, UNSPECIFIED TYPE (H): ICD-10-CM

## 2025-03-24 DIAGNOSIS — Z00.00 ENCOUNTER FOR MEDICARE ANNUAL WELLNESS EXAM: Primary | ICD-10-CM

## 2025-03-24 PROBLEM — E08.21 DIABETES MELLITUS DUE TO UNDERLYING CONDITION WITH DIABETIC NEPHROPATHY, WITHOUT LONG-TERM CURRENT USE OF INSULIN (H): Status: RESOLVED | Noted: 2017-10-24 | Resolved: 2025-03-24

## 2025-03-24 LAB
EST. AVERAGE GLUCOSE BLD GHB EST-MCNC: 180 MG/DL
HBA1C MFR BLD: 7.9 % (ref 0–5.6)

## 2025-03-24 PROCEDURE — 3079F DIAST BP 80-89 MM HG: CPT | Performed by: INTERNAL MEDICINE

## 2025-03-24 PROCEDURE — 99213 OFFICE O/P EST LOW 20 MIN: CPT | Mod: 25 | Performed by: INTERNAL MEDICINE

## 2025-03-24 PROCEDURE — G0103 PSA SCREENING: HCPCS | Performed by: INTERNAL MEDICINE

## 2025-03-24 PROCEDURE — 3074F SYST BP LT 130 MM HG: CPT | Performed by: INTERNAL MEDICINE

## 2025-03-24 PROCEDURE — 83036 HEMOGLOBIN GLYCOSYLATED A1C: CPT | Performed by: INTERNAL MEDICINE

## 2025-03-24 PROCEDURE — 1125F AMNT PAIN NOTED PAIN PRSNT: CPT | Performed by: INTERNAL MEDICINE

## 2025-03-24 PROCEDURE — G0439 PPPS, SUBSEQ VISIT: HCPCS | Performed by: INTERNAL MEDICINE

## 2025-03-24 PROCEDURE — 36415 COLL VENOUS BLD VENIPUNCTURE: CPT | Performed by: INTERNAL MEDICINE

## 2025-03-24 SDOH — HEALTH STABILITY: PHYSICAL HEALTH: ON AVERAGE, HOW MANY DAYS PER WEEK DO YOU ENGAGE IN MODERATE TO STRENUOUS EXERCISE (LIKE A BRISK WALK)?: 0 DAYS

## 2025-03-24 ASSESSMENT — PAIN SCALES - GENERAL: PAINLEVEL_OUTOF10: MILD PAIN (2)

## 2025-03-24 ASSESSMENT — SOCIAL DETERMINANTS OF HEALTH (SDOH): HOW OFTEN DO YOU GET TOGETHER WITH FRIENDS OR RELATIVES?: MORE THAN THREE TIMES A WEEK

## 2025-03-24 NOTE — PATIENT INSTRUCTIONS
Patient Education   Preventive Care Advice   This is general advice given by our system to help you stay healthy. However, your care team may have specific advice just for you. Please talk to your care team about your preventive care needs.  Nutrition  Eat 5 or more servings of fruits and vegetables each day.  Try wheat bread, brown rice and whole grain pasta (instead of white bread, rice, and pasta).  Get enough calcium and vitamin D. Check the label on foods and aim for 100% of the RDA (recommended daily allowance).  Lifestyle  Exercise at least 150 minutes each week  (30 minutes a day, 5 days a week).  Do muscle strengthening activities 2 days a week. These help control your weight and prevent disease.  No smoking.  Wear sunscreen to prevent skin cancer.  Have a dental exam and cleaning every 6 months.  Yearly exams  See your health care team every year to talk about:  Any changes in your health.  Any medicines your care team has prescribed.  Preventive care, family planning, and ways to prevent chronic diseases.  Shots (vaccines)   HPV shots (up to age 26), if you've never had them before.  Hepatitis B shots (up to age 59), if you've never had them before.  COVID-19 shot: Get this shot when it's due.  Flu shot: Get a flu shot every year.  Tetanus shot: Get a tetanus shot every 10 years.  Pneumococcal, hepatitis A, and RSV shots: Ask your care team if you need these based on your risk.  Shingles shot (for age 50 and up)  General health tests  Diabetes screening:  Starting at age 35, Get screened for diabetes at least every 3 years.  If you are younger than age 35, ask your care team if you should be screened for diabetes.  Cholesterol test: At age 39, start having a cholesterol test every 5 years, or more often if advised.  Bone density scan (DEXA): At age 50, ask your care team if you should have this scan for osteoporosis (brittle bones).  Hepatitis C: Get tested at least once in your life.  STIs (sexually  transmitted infections)  Before age 24: Ask your care team if you should be screened for STIs.  After age 24: Get screened for STIs if you're at risk. You are at risk for STIs (including HIV) if:  You are sexually active with more than one person.  You don't use condoms every time.  You or a partner was diagnosed with a sexually transmitted infection.  If you are at risk for HIV, ask about PrEP medicine to prevent HIV.  Get tested for HIV at least once in your life, whether you are at risk for HIV or not.  Cancer screening tests  Cervical cancer screening: If you have a cervix, begin getting regular cervical cancer screening tests starting at age 21.  Breast cancer scan (mammogram): If you've ever had breasts, begin having regular mammograms starting at age 40. This is a scan to check for breast cancer.  Colon cancer screening: It is important to start screening for colon cancer at age 45.  Have a colonoscopy test every 10 years (or more often if you're at risk) Or, ask your provider about stool tests like a FIT test every year or Cologuard test every 3 years.  To learn more about your testing options, visit:   .  For help making a decision, visit:   https://bit.ly/ju19159.  Prostate cancer screening test: If you have a prostate, ask your care team if a prostate cancer screening test (PSA) at age 55 is right for you.  Lung cancer screening: If you are a current or former smoker ages 50 to 80, ask your care team if ongoing lung cancer screenings are right for you.  For informational purposes only. Not to replace the advice of your health care provider. Copyright   2023 [employer]. All rights reserved. Clinically reviewed by the [facility]. Mingyian [id number] - REV 01/24.

## 2025-03-24 NOTE — PROGRESS NOTES
"Preventive Care Visit  [facility]  Gonzalez Akhtar MD, Internal Medicine  Mar 24, 2025      Assessment & Plan     Encounter for Medicare annual wellness exam      Type 2 diabetes mellitus with diabetic polyneuropathy, with long-term current use of insulin (H)  Under good control  Recheck hemoglobin A1c   - HEMOGLOBIN A1C; Future    Atrial fibrillation, unspecified type (H)  Second check pulse in clinic OK  On Xarelto for prophylaxis    Class 2 severe obesity with body mass index (BMI) of 35 to 39.9 with serious comorbidity (H)  Counseled on diet and exercise interventions to promote weight loss     Benign essential hypertension  OK control , continue current drug     Hyperlipidemia LDL goal <70  On statin therapy; lipids were at goal last September, recheck in 6 months    Medication management    - Med Therapy Management Referral    Patient has been advised of split billing requirements and indicates understanding: Yes    He has some left lateral plantar foot pain  I don't see an ulcer on exam, but he has some corn/callous  See podiatry for recommendations on how to manage pain     BMI  Estimated body mass index is 35.93 kg/m  as calculated from the following:    Height as of this encounter: 1.673 m (5' 5.87\").    Weight as of this encounter: 100.6 kg (221 lb 11.2 oz).   Weight management plan: Patient referred to endocrine and/or weight management specialty Discussed healthy diet and exercise guidelines    Counseling  Appropriate preventive services were addressed with this patient via screening, questionnaire, or discussion as appropriate for fall prevention, nutrition, physical activity, Tobacco-use cessation, social engagement, weight loss and cognition.  Checklist reviewing preventive services available has been given to the patient.  Reviewed patient's diet, addressing concerns and/or questions.       FUTURE APPOINTMENTS:       -  office visit appointment in 6 months return sooner if " needed     Subjective   Te is a 74 year old, presenting for the following:  Physical and Musculoskeletal Problem (Pain onset 10/01/24 feels like pt is stepping on a rock left  foot)        3/24/2025    11:07 AM   Additional Questions   Roomed by Mook ALBA           Advance Care Planning  Patient does not have a Health Care Directive:       3/24/2025   General Health   How would you rate your overall physical health? Good   Feel stress (tense, anxious, or unable to sleep) Not at all         3/24/2025   Nutrition   Diet: Regular (no restrictions)         3/24/2025   Exercise   Days per week of moderate/strenous exercise 0 days   (!) EXERCISE CONCERN      3/24/2025   Social Factors   Frequency of gathering with friends or relatives More than three times a week   Worry food won't last until get money to buy more No   Food not last or not have enough money for food? No   Do you have housing? (Housing is defined as stable permanent housing and does not include staying ouside in a car, in a tent, in an abandoned building, in an overnight shelter, or couch-surfing.) Yes   Are you worried about losing your housing? No   Lack of transportation? No   Unable to get utilities (heat,electricity)? No         3/24/2025   Fall Risk   Fallen 2 or more times in the past year? No     No   Trouble with walking or balance? No     No       Proxy-reported    Multiple values from one day are sorted in reverse-chronological order          3/24/2025   Activities of Daily Living- Home Safety   Needs help with the following daily activites None of the above   Safety concerns in the home None of the above         3/24/2025   Dental   Dentist two times every year? Yes         3/24/2025   Hearing Screening   Hearing concerns? None of the above         3/24/2025   Driving Risk Screening   Patient/family members have concerns about driving No         3/24/2025   General Alertness/Fatigue Screening   Have you been more tired than  usual lately? No         3/24/2025   Urinary Incontinence Screening   Bothered by leaking urine in past 6 months No           3/5/2024   TB Screening   Were you born outside of the [place]? No           Today's PHQ-2 Score:       3/24/2025    11:15 AM   PHQ-2 ( 1999 [employer])   Q1: Little interest or pleasure in doing things 0    Q2: Feeling down, depressed or hopeless 0    PHQ-2 Score 0    Q1: Little interest or pleasure in doing things Not at all   Q2: Feeling down, depressed or hopeless Not at all   PHQ-2 Score 0       Proxy-reported           3/24/2025   Substance Use   Alcohol more than 3/day or more than 7/wk No   Do you have a current opioid prescription? No   How severe/bad is pain from 1 to 10? 0/10 (No Pain)   Do you use any other substances recreationally? No     Social History     Tobacco Use    Smoking status: Never     Passive exposure: Never    Smokeless tobacco: Never   Vaping Use    Vaping status: Never Used   Substance Use Topics    Alcohol use: Yes     Alcohol/week: 0.0 standard drinks of alcohol     Comment: 4 drinks per week    Drug use: No           3/24/2025   AAA Screening   Family history of Abdominal Aortic Aneurysm (AAA)? No   ASCVD Risk   The ASCVD Risk score (Jasper DK, et al., 2019) failed to calculate for the following reasons:    The valid total cholesterol range is 130 to 320 mg/dL            Reviewed and updated as needed this visit by Provider     Meds  Problems               Past Medical History:   Diagnosis Date    Afferent pupillary defect     Anemia     Atrial fibrillation (H) 12/10/2014    EKG done at [facility] 12/10/14     B12 deficiency     Calculus of kidney     PSA followed by urology    CKD (chronic kidney disease), stage II     Exotropia     HTN (hypertension)     Hyperlipidaemia     Hyperlipidemia     Microalbuminuria     Morbid obesity (H)     Nonspecific abnormal unspecified cardiovascular function study     Optic atrophy, left eye     LE, mild    Optic disc pallor      LE    ROC (obstructive sleep apnea)     ROC on CPAP     Palpitations     Phrenic neuropathy     Type II or unspecified type diabetes mellitus without mention of complication, not stated as uncontrolled     retinopathy and nephropathy - followed by Dr. Arnold     Past Surgical History:   Procedure Laterality Date    COLONOSCOPY  2003    COLONOSCOPY  11/25/2013    Procedure: COLONOSCOPY;  COLONOSCOPY;  Surgeon: Uday Taylor MD;  Location:  GI     BP Readings from Last 3 Encounters:   03/24/25 129/85   12/24/24 116/62   10/30/24 115/73    Wt Readings from Last 3 Encounters:   03/24/25 100.6 kg (221 lb 11.2 oz)   12/24/24 99.3 kg (219 lb)   10/30/24 99.3 kg (219 lb)                  Patient Active Problem List   Diagnosis    Impotence of organic origin    Benign essential hypertension    Hyperlipidemia LDL goal <70    ROC (obstructive sleep apnea)    Low HDL (under 40)    B12 deficiency    Phrenic neuropathy    Partial optic atrophy    Atrial fibrillation (H)    Seasonal allergic rhinitis    Peripheral neuropathy    Cervical radiculopathy    Coronary artery calcification    Type 2 diabetes mellitus with diabetic polyneuropathy, with long-term current use of insulin (H)    Class 2 severe obesity with body mass index (BMI) of 35 to 39.9 with serious comorbidity (H)     Past Surgical History:   Procedure Laterality Date    COLONOSCOPY  2003    COLONOSCOPY  11/25/2013    Procedure: COLONOSCOPY;  COLONOSCOPY;  Surgeon: Uday Taylor MD;  Location:  GI       Social History     Tobacco Use    Smoking status: Never     Passive exposure: Never    Smokeless tobacco: Never   Substance Use Topics    Alcohol use: Yes     Alcohol/week: 0.0 standard drinks of alcohol     Comment: 4 drinks per week     Family History   Problem Relation Age of Onset    Heart Disease Father         disease    Arthritis Mother     Cancer - colorectal No family hx of          Current Outpatient Medications   Medication Sig Dispense Refill     acetaminophen (TYLENOL) 650 MG CR tablet Take 1,300 mg by mouth every 8 hours as needed for mild pain or fever      ammonium lactate (AMLACTIN) 12 % external cream APPLY TOPICALLY 2 TIMES DAILY AS NEEDED FOR DRY SKIN 385 g 0    Continuous Blood Gluc Sensor (DEXCOM G6 SENSOR) MISC Change every 10 days.      Continuous Blood Gluc Transmit (DEXCOM G6 TRANSMITTER) MISC       Cyanocobalamin (VITAMIN B-12 SL) Take 2 tablets by mouth daily Dose unknown      dapagliflozin (FARXIGA) 10 MG TABS tablet Take 10 mg by mouth daily      glimepiride (AMARYL) 4 MG tablet Take 8 mg by mouth daily      hydrochlorothiazide (MICROZIDE) 12.5 MG capsule TAKE TWO CAPSULES BY MOUTH DAILY 180 capsule 3    insulin lispro (HUMALOG KWIKPEN) 100 UNIT/ML (1 unit dial) KWIKPEN Inject Subcutaneous 3 times daily (before meals) According to sliding scale      lisinopril (ZESTRIL) 20 MG tablet Take 1 tablet (20 mg) by mouth 2 times daily 180 tablet 2    metFORMIN (GLUCOPHAGE) 1000 MG tablet Take 1 tablet (1,000 mg) by mouth 2 times daily (take with meals) 180 tablet 0    metoprolol succinate ER (TOPROL XL) 50 MG 24 hr tablet Take 1 tablet (50 mg) by mouth daily 90 tablet 3    OZEMPIC, 2 MG/DOSE, 8 MG/3ML pen Inject 2 mg subcutaneously every 7 days.      rivaroxaban ANTICOAGULANT (XARELTO ANTICOAGULANT) 20 MG TABS tablet TAKE ONE TABLET BY MOUTH ONE TIME DAILY WITH DINNER 90 tablet 3    rosuvastatin (CRESTOR) 20 MG tablet Take 1 tablet (20 mg) by mouth daily. 90 tablet 0    triamcinolone (KENALOG) 0.1 % external cream Apply topically 2 times daily. To the leg area 80 g 3    ULTICARE 29G X 12.7MM insulin pen needle USE ONCE DAILY      UNABLE TO FIND Take 2 capsules by mouth daily MEDICATION NAME: Lymphatic Support 1000      VITAMIN D (CHOLECALCIFEROL) PO Take 1 tablet by mouth daily Dose unknown       Current providers sharing in care for this patient include:  Patient Care Team:  Gonzalez Akhtar MD as PCP - General (Internal Medicine)  Maria C  "Ramakrishna PATEL (Ophthalmology)  Tosin Gillette, PharmD as Pharmacist (Pharmacist)  Moncho Chairez MD as MD (Endocrinology, Diabetes, and Metabolism)  Osiel Taylor MD as Assigned Surgical Provider  Tosin Gillette, PharmD as Assigned MTM Pharmacist  Gonzalez Akhtar MD as Assigned PCP  Pablo Enrique MD as Assigned Heart and Vascular Provider  Richi Keyes MD as MD (Dermatology)    The following health maintenance items are reviewed in Epic and correct as of today:  Health Maintenance   Topic Date Due    Medicare Annual MTM Pharmacist Visit (once per calendar year)  01/01/2025    A1C  03/12/2025    COVID-19 Vaccine (9 - 2024-25 season) 06/02/2025    EYE EXAM  08/27/2025    BMP  09/12/2025    LIPID  09/12/2025    MICROALBUMIN  09/12/2025    MEDICARE ANNUAL WELLNESS VISIT  03/24/2026    DIABETIC FOOT EXAM  03/24/2026    ANNUAL REVIEW OF HM ORDERS  03/24/2026    FALL RISK ASSESSMENT  03/24/2026    COLORECTAL CANCER SCREENING  03/19/2027    ADVANCE CARE PLANNING  03/12/2029    DTAP/TDAP/TD IMMUNIZATION (3 - Td or Tdap) 11/08/2032    HEPATITIS C SCREENING  Completed    PHQ-2 (once per calendar year)  Completed    INFLUENZA VACCINE  Completed    Pneumococcal Vaccine: 50+ Years  Completed    URINALYSIS  Completed    ZOSTER IMMUNIZATION  Completed    RSV VACCINE  Completed    HPV IMMUNIZATION  Aged Out    MENINGITIS IMMUNIZATION  Aged Out    TSH W/FREE T4 REFLEX  Discontinued       A 10 organ systems ROS is negative other than any pertinent positives or negatives previously stated.      Objective    Exam  /85 (BP Location: Right arm, Patient Position: Sitting, Cuff Size: Adult Large)   Pulse 88   Temp 98.1  F (36.7  C) (Oral)   Resp 18   Ht 1.673 m (5' 5.87\")   Wt 100.6 kg (221 lb 11.2 oz)   SpO2 98%   BMI 35.93 kg/m     Estimated body mass index is 35.93 kg/m  as calculated from the following:    Height as of this encounter: 1.673 m (5' 5.87\").    Weight as of this encounter: 100.6 kg (221 " lb 11.2 oz).    Physical Exam  GENERAL: alert and no distress  EYES: Eyes grossly normal to inspection, PERRL and conjunctivae and sclerae normal  HENT: ear canals and TM's normal, nose and mouth without ulcers or lesions  NECK: no adenopathy, no asymmetry, masses, or scars  RESP: lungs clear to auscultation - no rales, rhonchi or wheezes  CV: regular rate and rhythm, normal S1 S2, no S3 or S4, no murmur, click or rub, no peripheral edema  ABDOMEN: soft, nontender, no hepatosplenomegaly, no masses and bowel sounds normal  MS: no gross musculoskeletal defects noted, no edema  SKIN: no suspicious lesions or rashes  NEURO: Normal strength and tone, mentation intact and speech normal  PSYCH: mentation appears normal, affect normal/bright  Diabetic foot exam: normal DP and PT pulses, no trophic changes or ulcerative lesions, and diminished sensation to 10 g monofilament bilaterally        3/24/2025   Mini Cog   Clock Draw Score 2 Normal   3 Item Recall 3 objects recalled   Mini Cog Total Score 5              Signed Electronically by: Gonzalez Akhtar MD

## 2025-03-25 ENCOUNTER — PATIENT OUTREACH (OUTPATIENT)
Dept: CARE COORDINATION | Facility: CLINIC | Age: 75
End: 2025-03-25
Payer: COMMERCIAL

## 2025-03-25 LAB — PSA SERPL DL<=0.01 NG/ML-MCNC: 0.88 NG/ML (ref 0–6.5)

## 2025-03-25 NOTE — RESULT ENCOUNTER NOTE
The following letter pertains to your most recent diagnostic tests:    -Your prostate specific antigen (PSA) test result returned normal.     -Your hemoglobin A1c test which averages your blood sugars over the last 3 months returned at 7.9 which is at your goal of hemoglobin A1c at least less than 8, but up from 7.4 last check.  Watching the foods and drinks with added sugars and cutting back on breads and grains will help make sure this number does not increase further.  We should recheck in 6 months.        Sincerely,    Dr. Akhtar

## 2025-03-27 ENCOUNTER — PATIENT OUTREACH (OUTPATIENT)
Dept: CARE COORDINATION | Facility: CLINIC | Age: 75
End: 2025-03-27
Payer: COMMERCIAL

## 2025-04-07 DIAGNOSIS — I48.19 PERSISTENT ATRIAL FIBRILLATION (H): ICD-10-CM

## 2025-04-08 RX ORDER — METOPROLOL SUCCINATE 50 MG/1
50 TABLET, EXTENDED RELEASE ORAL DAILY
Qty: 90 TABLET | Refills: 0 | Status: SHIPPED | OUTPATIENT
Start: 2025-04-08

## 2025-04-16 ENCOUNTER — ANCILLARY PROCEDURE (OUTPATIENT)
Dept: GENERAL RADIOLOGY | Facility: CLINIC | Age: 75
End: 2025-04-16
Attending: PODIATRIST
Payer: COMMERCIAL

## 2025-04-16 ENCOUNTER — OFFICE VISIT (OUTPATIENT)
Dept: PODIATRY | Facility: CLINIC | Age: 75
End: 2025-04-16
Attending: INTERNAL MEDICINE
Payer: COMMERCIAL

## 2025-04-16 VITALS — BODY MASS INDEX: 35.83 KG/M2 | WEIGHT: 221.1 LBS

## 2025-04-16 DIAGNOSIS — I73.9 PAD (PERIPHERAL ARTERY DISEASE): ICD-10-CM

## 2025-04-16 DIAGNOSIS — M79.672 LEFT FOOT PAIN: ICD-10-CM

## 2025-04-16 DIAGNOSIS — Z79.4 TYPE 2 DIABETES MELLITUS WITH DIABETIC POLYNEUROPATHY, WITH LONG-TERM CURRENT USE OF INSULIN (H): ICD-10-CM

## 2025-04-16 DIAGNOSIS — M20.41 HAMMER TOES OF BOTH FEET: ICD-10-CM

## 2025-04-16 DIAGNOSIS — L90.9 FAT PAD ATROPHY OF FOOT: ICD-10-CM

## 2025-04-16 DIAGNOSIS — M77.52 CAPSULITIS OF METATARSOPHALANGEAL (MTP) JOINT OF LEFT FOOT: Primary | ICD-10-CM

## 2025-04-16 DIAGNOSIS — M20.42 HAMMER TOES OF BOTH FEET: ICD-10-CM

## 2025-04-16 DIAGNOSIS — M77.52 CAPSULITIS OF METATARSOPHALANGEAL (MTP) JOINT OF LEFT FOOT: ICD-10-CM

## 2025-04-16 DIAGNOSIS — E11.42 TYPE 2 DIABETES MELLITUS WITH DIABETIC POLYNEUROPATHY, WITH LONG-TERM CURRENT USE OF INSULIN (H): ICD-10-CM

## 2025-04-16 PROCEDURE — 73630 X-RAY EXAM OF FOOT: CPT | Mod: TC | Performed by: STUDENT IN AN ORGANIZED HEALTH CARE EDUCATION/TRAINING PROGRAM

## 2025-04-16 NOTE — LETTER
4/16/2025      [facility]oder  [address] [facility] [facility] [id number]      Dear Colleague,    Thank you for referring your patient, Te Yoder, to the [facility]. Please see a copy of my visit note below.    PATIENT HISTORY:  Dr. Akhtar requested I see this patient for their foot issue.      Te Yoder is a 74 year old male who presents to clinic for left foot pain on the outside. He has a PMHx significant for DM type II with peripheral neuropathy, A-fib anticoagulated, CKD stage 2. He was just seen by his PCP 3/24/25 who noted some corns in this part of his foot. The pain has been pressent for over 8 months of sudden onset, not really progressing, not improving. He has tried nothing for treatment. Sitting alleviates the pain. Walking standing aggravates the pain, especially if barefoot. Denies stepping on anything but the pain is sharp like he has a stone in his foot.     He also notes pain to his legs which requires him to sit and rest after walking short distances. After sitting his pain improves. He has had prior JAVIER's which were normal in 2018. He is concerned about his blood flow today.     Review of Systems:  Patient denies fever, chills, rash, wound, stiffness, limping, numbness, weakness, heart burn, blood in stool, chest pain with activity, calf pain when walking, shortness of breath with activity, chronic cough, easy bleeding/bruising, swelling of ankles, excessive thirst, fatigue, depression, anxiety.  Patient admits to pain left foot.     PAST MEDICAL HISTORY:   Past Medical History:   Diagnosis Date     Afferent pupillary defect      Anemia      Atrial fibrillation (H) 12/10/2014    EKG done at PMD 12/10/14      B12 deficiency      Calculus of kidney     PSA followed by urology     CKD (chronic kidney disease), stage II      Exotropia      HTN (hypertension)      Hyperlipidaemia      Hyperlipidemia      Microalbuminuria      Morbid obesity (H)      Nonspecific abnormal  unspecified cardiovascular function study      Optic atrophy, left eye     LE, mild     Optic disc pallor     LE     ROC (obstructive sleep apnea)      ROC on CPAP      Palpitations      Phrenic neuropathy      Type II or unspecified type diabetes mellitus without mention of complication, not stated as uncontrolled     retinopathy and nephropathy - followed by Dr. Arnold        PAST SURGICAL HISTORY:   Past Surgical History:   Procedure Laterality Date     COLONOSCOPY  2003     COLONOSCOPY  11/25/2013    Procedure: COLONOSCOPY;  COLONOSCOPY;  Surgeon: Uday Taylor MD;  Location:  GI        MEDICATIONS:   Current Outpatient Medications:      acetaminophen (TYLENOL) 650 MG CR tablet, Take 1,300 mg by mouth every 8 hours as needed for mild pain or fever, Disp: , Rfl:      ammonium lactate (AMLACTIN) 12 % external cream, APPLY TOPICALLY 2 TIMES DAILY AS NEEDED FOR DRY SKIN, Disp: 385 g, Rfl: 0     Continuous Blood Gluc Sensor (DEXCOM G6 SENSOR) MISC, Change every 10 days., Disp: , Rfl:      Continuous Blood Gluc Transmit (DEXCOM G6 TRANSMITTER) MISC, , Disp: , Rfl:      Cyanocobalamin (VITAMIN B-12 SL), Take 2 tablets by mouth daily Dose unknown, Disp: , Rfl:      dapagliflozin (FARXIGA) 10 MG TABS tablet, Take 10 mg by mouth daily, Disp: , Rfl:      glimepiride (AMARYL) 4 MG tablet, Take 8 mg by mouth daily, Disp: , Rfl:      hydrochlorothiazide (MICROZIDE) 12.5 MG capsule, TAKE TWO CAPSULES BY MOUTH DAILY, Disp: 180 capsule, Rfl: 3     insulin lispro (HUMALOG KWIKPEN) 100 UNIT/ML (1 unit dial) KWIKPEN, Inject Subcutaneous 3 times daily (before meals) According to sliding scale, Disp: , Rfl:      lisinopril (ZESTRIL) 20 MG tablet, Take 1 tablet (20 mg) by mouth 2 times daily, Disp: 180 tablet, Rfl: 2     metFORMIN (GLUCOPHAGE) 1000 MG tablet, Take 1 tablet (1,000 mg) by mouth 2 times daily (take with meals), Disp: 180 tablet, Rfl: 0     metoprolol succinate ER (TOPROL XL) 50 MG 24 hr tablet, TAKE 1 TABLET BY  MOUTH ONCE DAILY, Disp: 90 tablet, Rfl: 0     OZEMPIC, 2 MG/DOSE, 8 MG/3ML pen, Inject 2 mg subcutaneously every 7 days., Disp: , Rfl:      rivaroxaban ANTICOAGULANT (XARELTO ANTICOAGULANT) 20 MG TABS tablet, TAKE ONE TABLET BY MOUTH ONE TIME DAILY WITH DINNER, Disp: 90 tablet, Rfl: 3     rosuvastatin (CRESTOR) 20 MG tablet, Take 1 tablet (20 mg) by mouth daily., Disp: 90 tablet, Rfl: 0     triamcinolone (KENALOG) 0.1 % external cream, Apply topically 2 times daily. To the leg area, Disp: 80 g, Rfl: 3     ULTICARE 29G X 12.7MM insulin pen needle, USE ONCE DAILY, Disp: , Rfl:      UNABLE TO FIND, Take 2 capsules by mouth daily MEDICATION NAME: Lymphatic Support 1000, Disp: , Rfl:      VITAMIN D (CHOLECALCIFEROL) PO, Take 1 tablet by mouth daily Dose unknown, Disp: , Rfl:      ALLERGIES:    Allergies   Allergen Reactions     Simvastatin Other (See Comments)     Muscle aches, elevated CK levels        SOCIAL HISTORY:   Social History     Socioeconomic History     Marital status: Single     Spouse name: Not on file     Number of children: 0     Years of education: BA     Highest education level: Not on file   Occupational History     Occupation: retired office   Tobacco Use     Smoking status: Never     Passive exposure: Never     Smokeless tobacco: Never   Vaping Use     Vaping status: Never Used   Substance and Sexual Activity     Alcohol use: Yes     Alcohol/week: 0.0 standard drinks of alcohol     Comment: 4 drinks per week     Drug use: No     Sexual activity: Yes     Partners: Female   Other Topics Concern     Parent/sibling w/ CABG, MI or angioplasty before 65F 55M? Not Asked   Social History Narrative     Not on file     Social Drivers of Health     Financial Resource Strain: Low Risk  (3/24/2025)    Financial Resource Strain      Within the past 12 months, have you or your family members you live with been unable to get utilities (heat, electricity) when it was really needed?: No   Food Insecurity: Low Risk   (3/24/2025)    Food Insecurity      Within the past 12 months, did you worry that your food would run out before you got money to buy more?: No      Within the past 12 months, did the food you bought just not last and you didn t have money to get more?: No   Transportation Needs: Low Risk  (3/24/2025)    Transportation Needs      Within the past 12 months, has lack of transportation kept you from medical appointments, getting your medicines, non-medical meetings or appointments, work, or from getting things that you need?: No   Physical Activity: Unknown (3/24/2025)    Exercise Vital Sign      Days of Exercise per Week: 0 days      Minutes of Exercise per Session: Not on file   Stress: No Stress Concern Present (3/24/2025)    [employer] [place] [employer] - Occupational Stress Questionnaire      Feeling of Stress : Not at all   Social Connections: Unknown (3/24/2025)    Social Connection and Isolation Panel [NHANES]      Frequency of Communication with Friends and Family: Not on file      Frequency of Social Gatherings with Friends and Family: More than three times a week      Attends Judaism Services: Not on file      Active Member of Clubs or Organizations: Not on file      Attends Club or Organization Meetings: Not on file      Marital Status: Not on file   Interpersonal Safety: Low Risk  (3/24/2025)    Interpersonal Safety      Do you feel physically and emotionally safe where you currently live?: Yes      Within the past 12 months, have you been hit, slapped, kicked or otherwise physically hurt by someone?: No      Within the past 12 months, have you been humiliated or emotionally abused in other ways by your partner or ex-partner?: No   Housing Stability: Low Risk  (3/24/2025)    Housing Stability      Do you have housing? : Yes      Are you worried about losing your housing?: No        FAMILY HISTORY:   Family History   Problem Relation Age of Onset     Heart Disease Father         disease      Arthritis Mother      Cancer - colorectal No family hx of         EXAM:Vitals: There were no vitals taken for this visit.  BMI= There is no height or weight on file to calculate BMI.    A1C:   Lab Results   Component Value Date    A1C 7.9 03/24/2025    A1C 7.4 09/12/2024    A1C 7.4 03/12/2024    A1C 6.8 09/08/2023    A1C 7.2 07/26/2023    A1C 8.6 06/01/2021    A1C 9.2 04/15/2021    A1C 8.4 12/29/2020    A1C 7.3 07/06/2020    A1C 7.5 06/03/2019       General appearance: Patient is alert and fully cooperative with history & exam.  No sign of distress is noted during the visit.     Psychiatric: Affect is pleasant & appropriate.  Patient appears motivated to improve health.     Respiratory: Breathing is regular & unlabored while sitting.     HEENT: Hearing is intact to spoken word.  Speech is clear.  No gross evidence of visual impairment that would impact ambulation.    Lower Extremity Focused:    Dermatologic: plantar left 5th metatarsal phalangeal joint slight hyperkeratotic tissue noted, no noted petechia, no noted foreign body, no ulceration.      Vascular: DP pulse 2/4 right 2/4 left PT pulse 2/4 right 1/4 left.  Extensive varicosities noted bilateral lower extremities with brawny edema.  CFT and skin temperature is normal to both lower extremities.     Neurologic: Lower extremity sensation is diminished bilateral feet with light touch.      Musculoskeletal: pain with palpation to the left 5th metatarsal phalangeal joint plantar, no palpable soft tissue mass, decreased plantar forefoot fat pad bilateral. Toes 2-5 plantarflexed rigid. Reduced ankle and subtalar joint range of motion bilateral      I have personally reviewed the following labs and imaging:     US JAVIER DOPPLER WITH EXERCISE  3/8/2017 3:37 PM      HISTORY: Peripheral vascular disease, unspecified     COMPARISON: None.     FINDINGS:   The patient walked on a treadmill for 5 minutes at a 2% incline and at  a speed of 1.5 miles per hour     Patient did  not experience lower extremity claudication symptoms with  exercise.     The resting and exercise ABIs on the right are 1.25 and 1.11  respectively.     The resting and exercise ABIs on the left are  1.11 and 0.97  respectively.     Waveform analysis indicates multiphasic waveforms in the distal tibial  arteries.                                                                      IMPRESSION: Findings do not indicate significant lower extremity  arterial insufficiency.       JERRY MENA MD     ASSESSMENT:   DM type II with neuropathy   Pain left foot  Left 5th metatarsal phalangeal joint capsulitis  Fat pad atrophy bilateral feet  Hammer toes  PAD  A-fib on xarelto     Medical Decision Making/Plan:  Reviewed patient's chart in [facility].  X-rays of the left foot 3 views ordered and reviewed today in office with the patient to rule out deformity of the fifth metatarsal phalangeal joint. No fracture, normal 5th metatarsal phalangeal joint  Diabetic shoes and orthotics prescribed today for foot support and pain relief. He may need his left 5th metatarsal phalangeal joint offloaded in the orthotics.   Check your feet daily for any new callus's, wounds  Continue with supportive shoes at this time.   JAVIER ultrasound ordered today due to claudication like symptoms. Palpable pedal pulses today in clinic, and prior JAVIER normal from 2017  Follow-up yearly or as needed. If pain persists we may consider a steroid injeciton      All questions were answered to patients satisfaction and they will call with further questions or concerns.       Tammy Partida DPM      Again, thank you for allowing me to participate in the care of your patient.        Sincerely,        Tammy Partida DPM    Electronically signed

## 2025-04-16 NOTE — PROGRESS NOTES
PATIENT HISTORY:  Dr. Akhtar requested I see this patient for their foot issue.      Te Yoder is a 74 year old male who presents to clinic for left foot pain on the outside. He has a PMHx significant for DM type II with peripheral neuropathy, A-fib anticoagulated, CKD stage 2. He was just seen by his PCP 3/24/25 who noted some corns in this part of his foot. The pain has been pressent for over 8 months of sudden onset, not really progressing, not improving. He has tried nothing for treatment. Sitting alleviates the pain. Walking standing aggravates the pain, especially if barefoot. Denies stepping on anything but the pain is sharp like he has a stone in his foot.     He also notes pain to his legs which requires him to sit and rest after walking short distances. After sitting his pain improves. He has had prior JAVIER's which were normal in 2018. He is concerned about his blood flow today.     Review of Systems:  Patient denies fever, chills, rash, wound, stiffness, limping, numbness, weakness, heart burn, blood in stool, chest pain with activity, calf pain when walking, shortness of breath with activity, chronic cough, easy bleeding/bruising, swelling of ankles, excessive thirst, fatigue, depression, anxiety.  Patient admits to pain left foot.     PAST MEDICAL HISTORY:   Past Medical History:   Diagnosis Date    Afferent pupillary defect     Anemia     Atrial fibrillation (H) 12/10/2014    EKG done at PMD 12/10/14     B12 deficiency     Calculus of kidney     PSA followed by urology    CKD (chronic kidney disease), stage II     Exotropia     HTN (hypertension)     Hyperlipidaemia     Hyperlipidemia     Microalbuminuria     Morbid obesity (H)     Nonspecific abnormal unspecified cardiovascular function study     Optic atrophy, left eye     LE, mild    Optic disc pallor     LE    ROC (obstructive sleep apnea)     ROC on CPAP     Palpitations     Phrenic neuropathy     Type II or unspecified type diabetes mellitus  without mention of complication, not stated as uncontrolled     retinopathy and nephropathy - followed by Dr. Arnold        PAST SURGICAL HISTORY:   Past Surgical History:   Procedure Laterality Date    COLONOSCOPY  2003    COLONOSCOPY  11/25/2013    Procedure: COLONOSCOPY;  COLONOSCOPY;  Surgeon: Uday Taylor MD;  Location:  GI        MEDICATIONS:   Current Outpatient Medications:     acetaminophen (TYLENOL) 650 MG CR tablet, Take 1,300 mg by mouth every 8 hours as needed for mild pain or fever, Disp: , Rfl:     ammonium lactate (AMLACTIN) 12 % external cream, APPLY TOPICALLY 2 TIMES DAILY AS NEEDED FOR DRY SKIN, Disp: 385 g, Rfl: 0    Continuous Blood Gluc Sensor (DEXCOM G6 SENSOR) MISC, Change every 10 days., Disp: , Rfl:     Continuous Blood Gluc Transmit (DEXCOM G6 TRANSMITTER) MISC, , Disp: , Rfl:     Cyanocobalamin (VITAMIN B-12 SL), Take 2 tablets by mouth daily Dose unknown, Disp: , Rfl:     dapagliflozin (FARXIGA) 10 MG TABS tablet, Take 10 mg by mouth daily, Disp: , Rfl:     glimepiride (AMARYL) 4 MG tablet, Take 8 mg by mouth daily, Disp: , Rfl:     hydrochlorothiazide (MICROZIDE) 12.5 MG capsule, TAKE TWO CAPSULES BY MOUTH DAILY, Disp: 180 capsule, Rfl: 3    insulin lispro (HUMALOG KWIKPEN) 100 UNIT/ML (1 unit dial) KWIKPEN, Inject Subcutaneous 3 times daily (before meals) According to sliding scale, Disp: , Rfl:     lisinopril (ZESTRIL) 20 MG tablet, Take 1 tablet (20 mg) by mouth 2 times daily, Disp: 180 tablet, Rfl: 2    metFORMIN (GLUCOPHAGE) 1000 MG tablet, Take 1 tablet (1,000 mg) by mouth 2 times daily (take with meals), Disp: 180 tablet, Rfl: 0    metoprolol succinate ER (TOPROL XL) 50 MG 24 hr tablet, TAKE 1 TABLET BY MOUTH ONCE DAILY, Disp: 90 tablet, Rfl: 0    OZEMPIC, 2 MG/DOSE, 8 MG/3ML pen, Inject 2 mg subcutaneously every 7 days., Disp: , Rfl:     rivaroxaban ANTICOAGULANT (XARELTO ANTICOAGULANT) 20 MG TABS tablet, TAKE ONE TABLET BY MOUTH ONE TIME DAILY WITH DINNER, Disp: 90  tablet, Rfl: 3    rosuvastatin (CRESTOR) 20 MG tablet, Take 1 tablet (20 mg) by mouth daily., Disp: 90 tablet, Rfl: 0    triamcinolone (KENALOG) 0.1 % external cream, Apply topically 2 times daily. To the leg area, Disp: 80 g, Rfl: 3    ULTICARE 29G X 12.7MM insulin pen needle, USE ONCE DAILY, Disp: , Rfl:     UNABLE TO FIND, Take 2 capsules by mouth daily MEDICATION NAME: Lymphatic Support 1000, Disp: , Rfl:     VITAMIN D (CHOLECALCIFEROL) PO, Take 1 tablet by mouth daily Dose unknown, Disp: , Rfl:      ALLERGIES:    Allergies   Allergen Reactions    Simvastatin Other (See Comments)     Muscle aches, elevated CK levels        SOCIAL HISTORY:   Social History     Socioeconomic History    Marital status: Single     Spouse name: Not on file    Number of children: 0    Years of education: BA    Highest education level: Not on file   Occupational History    Occupation: retired office   Tobacco Use    Smoking status: Never     Passive exposure: Never    Smokeless tobacco: Never   Vaping Use    Vaping status: Never Used   Substance and Sexual Activity    Alcohol use: Yes     Alcohol/week: 0.0 standard drinks of alcohol     Comment: 4 drinks per week    Drug use: No    Sexual activity: Yes     Partners: Female   Other Topics Concern    Parent/sibling w/ CABG, MI or angioplasty before 65F 55M? Not Asked   Social History Narrative    Not on file     Social Drivers of Health     Financial Resource Strain: Low Risk  (3/24/2025)    Financial Resource Strain     Within the past 12 months, have you or your family members you live with been unable to get utilities (heat, electricity) when it was really needed?: No   Food Insecurity: Low Risk  (3/24/2025)    Food Insecurity     Within the past 12 months, did you worry that your food would run out before you got money to buy more?: No     Within the past 12 months, did the food you bought just not last and you didn t have money to get more?: No   Transportation Needs: Low Risk   (3/24/2025)    Transportation Needs     Within the past 12 months, has lack of transportation kept you from medical appointments, getting your medicines, non-medical meetings or appointments, work, or from getting things that you need?: No   Physical Activity: Unknown (3/24/2025)    Exercise Vital Sign     Days of Exercise per Week: 0 days     Minutes of Exercise per Session: Not on file   Stress: No Stress Concern Present (3/24/2025)    [employer] [place] [employer] - Occupational Stress Questionnaire     Feeling of Stress : Not at all   Social Connections: Unknown (3/24/2025)    Social Connection and Isolation Panel [NHANES]     Frequency of Communication with Friends and Family: Not on file     Frequency of Social Gatherings with Friends and Family: More than three times a week     Attends Christian Services: Not on file     Active Member of Clubs or Organizations: Not on file     Attends Club or Organization Meetings: Not on file     Marital Status: Not on file   Interpersonal Safety: Low Risk  (3/24/2025)    Interpersonal Safety     Do you feel physically and emotionally safe where you currently live?: Yes     Within the past 12 months, have you been hit, slapped, kicked or otherwise physically hurt by someone?: No     Within the past 12 months, have you been humiliated or emotionally abused in other ways by your partner or ex-partner?: No   Housing Stability: Low Risk  (3/24/2025)    Housing Stability     Do you have housing? : Yes     Are you worried about losing your housing?: No        FAMILY HISTORY:   Family History   Problem Relation Age of Onset    Heart Disease Father         disease    Arthritis Mother     Cancer - colorectal No family hx of         EXAM:Vitals: There were no vitals taken for this visit.  BMI= There is no height or weight on file to calculate BMI.    A1C:   Lab Results   Component Value Date    A1C 7.9 03/24/2025    A1C 7.4 09/12/2024    A1C 7.4 03/12/2024    A1C 6.8  09/08/2023    A1C 7.2 07/26/2023    A1C 8.6 06/01/2021    A1C 9.2 04/15/2021    A1C 8.4 12/29/2020    A1C 7.3 07/06/2020    A1C 7.5 06/03/2019       General appearance: Patient is alert and fully cooperative with history & exam.  No sign of distress is noted during the visit.     Psychiatric: Affect is pleasant & appropriate.  Patient appears motivated to improve health.     Respiratory: Breathing is regular & unlabored while sitting.     HEENT: Hearing is intact to spoken word.  Speech is clear.  No gross evidence of visual impairment that would impact ambulation.    Lower Extremity Focused:    Dermatologic: plantar left 5th metatarsal phalangeal joint slight hyperkeratotic tissue noted, no noted petechia, no noted foreign body, no ulceration.      Vascular: DP pulse 2/4 right 2/4 left PT pulse 2/4 right 1/4 left.  Extensive varicosities noted bilateral lower extremities with brawny edema.  CFT and skin temperature is normal to both lower extremities.     Neurologic: Lower extremity sensation is diminished bilateral feet with light touch.      Musculoskeletal: pain with palpation to the left 5th metatarsal phalangeal joint plantar, no palpable soft tissue mass, decreased plantar forefoot fat pad bilateral. Toes 2-5 plantarflexed rigid. Reduced ankle and subtalar joint range of motion bilateral      I have personally reviewed the following labs and imaging:     US JAVIER DOPPLER WITH EXERCISE  3/8/2017 3:37 PM      HISTORY: Peripheral vascular disease, unspecified     COMPARISON: None.     FINDINGS:   The patient walked on a treadmill for 5 minutes at a 2% incline and at  a speed of 1.5 miles per hour     Patient did not experience lower extremity claudication symptoms with  exercise.     The resting and exercise ABIs on the right are 1.25 and 1.11  respectively.     The resting and exercise ABIs on the left are  1.11 and 0.97  respectively.     Waveform analysis indicates multiphasic waveforms in the distal  tibial  arteries.                                                                      IMPRESSION: Findings do not indicate significant lower extremity  arterial insufficiency.       JERRY MENA MD     ASSESSMENT:   DM type II with neuropathy   Pain left foot  Left 5th metatarsal phalangeal joint capsulitis  Fat pad atrophy bilateral feet  Hammer toes  PAD  A-fib on xarelto     Medical Decision Making/Plan:  Reviewed patient's chart in [facility].  X-rays of the left foot 3 views ordered and reviewed today in office with the patient to rule out deformity of the fifth metatarsal phalangeal joint. No fracture, normal 5th metatarsal phalangeal joint  Diabetic shoes and orthotics prescribed today for foot support and pain relief. He may need his left 5th metatarsal phalangeal joint offloaded in the orthotics.   Check your feet daily for any new callus's, wounds  Continue with supportive shoes at this time.   JAVIER ultrasound ordered today due to claudication like symptoms. Palpable pedal pulses today in clinic, and prior JAVIER normal from 2017  Follow-up yearly or as needed. If pain persists we may consider a steroid injeciton      All questions were answered to patients satisfaction and they will call with further questions or concerns.       Tammy Partida DPM

## 2025-04-16 NOTE — PATIENT INSTRUCTIONS
"Thank you for choosing [facility] Podiatry / Foot & Ankle Surgery!    DR DAI CLINIC:  [facility]   [address]   [place] [address]   (Mon, Tues)     [facility]  [address], [place] [address]  (Friday)    [facility]  [address]  [place] [address]  (Wednesdays)       TRIAGE LINE: [phone]  APPOINTMENTS: [phone]  RADIOLOGY: [phone]  SET UP SURGERY: [phone]  PHYSICAL THERAPY: [phone]   BILLING QUESTIONS: [phone]  FAX: [phone]       Follow up: in 6 weeks of wearing your shoes if pain persists or as needed.       X-rays of the left foot 3 views ordered and reviewed today in office with the patient to rule out deformity of the fifth metatarsal phalangeal joint.  Diabetic shoes and orthotics prescribed today for foot support and pain relief.   Check your feet daily for any new callus's, wounds  Continue with supportive shoes at this time.   JAVIER ultrasound ordered today due to claudication like symptoms. Palpable pedal pulses  Follow-up yearly or as needed   DIABETES AND YOUR FEET  Diabetes can result in several problems in the feet including ulcers (open sores) and amputations. Two of the most important reasons why people develop foot problems when they have diabetes is : 1. Neuropathy (loss of feeling)  2. Vascular disease (loss or decrease of blood flow).    Neuropathy is a term used to describe a loss of nerve function.  Patients with diabetes are at risk of developing neuropathy if their sugars continue to run high and are above the normal value. One theory for neuropathy is that the \"extra\" sugar in the body enters the nerves and is broken down. These by-products build up in the nerve causing it to swell and impairing nerve function. Often times, this can be prevented by controlling your sugars, dieting and exercise.    When a person develops neuropathy, they usually begin to " feel numbness or tingling in their feet and sometime in their legs.  Other symptoms may include painful burning or hot feet, tingling or feeling like insects or ants are crawling on your feet or legs.  If the diabetes is sever and the sugars run high for long periods of time, neuropathy can also occur in the hands.    Vascular disease  is a term used to describe a loss or decrease in circulation (blood flow). There is a problem in getting blood and oxygen to areas that need it. Similar to neuropathy, sugars can build up in the walls of the arteries (blood vessels) and cause them to become swollen, thickened and hardened. This decreases the amount of blood that can go to an area that needs it. Though this is common in the legs of diabetic patients, it can also affect other arteries (blood vessels) in the body such as in the heart and eyes.    In the legs, vascular disease usually results in cramping. Patients who develop leg cramps after walking the same distance every time (i.e. One block, half a mile, ect.) need to let their doctors know so that their circulation may be checked. Cramps causing severe pain in the feet and/or legs while sleeping and the cramps go away when you stand or hang your legs off the side of the bed, may also be a sign of poor blood circulation.  Occasional cramping in cold weather or on rare occasions with activity may not be due to poor circulation, but you should inform your doctor.    PREVENTION OF THESE DISEASES  The key to prevention is good blood sugar control. Poor blood sugar control is a big reason many of these problems start. Physical activity (exercise) is a very good way to help decrease your blood sugars. Exercise can lower your blood sugar, blood pressure, and cholesterol. It also reduces your risk for heart disease and stroke, relieves stress, and strengthens your heart, muscles and bones.  In addition, regular activity helps insulin work better, improves your blood  "circulation, and keeps your joints flexible. If you're trying to lose weight, a combination of exercise and wise food choices can help you reach your target weight and maintain it.      PAIN MANAGEMENT (**Please speak with your primary doctor about any medications**)  1.Blood Sugar Control - Most important  2. Medications such as:  Amytriptylline, duloxetine, gabapentin, lyrica, tramadol (talk with your primary care doctor about this).     NUTRITION:  Nutrition is also important to help with healing. If your body does not have what it needs, it can't heal.   Increasing your protein intake is important.  With wounds you need 60-90gm of protein a day to help with healing. Over the counter protein shakes such as Fady, Glucerna, Ensure, ect... can help to supplement your daily protein intake.   It is also important to take Vitamins to help with healing.  Vitamins such as B12, B6 and Vitamin D3 are important for healing. These can be gotten over the counter at pharmacies or at stores like [employer] or the [employer].    I can also prescribe a dietary supplement called \"Rheumate\" that has a lot of essential vitamins in one capsule.  This may not be covered by insurance though.     FOOT CARE RECOMMENDATIONS   1. Wash your feet with lukewarm water and a mild soap and then dry them thoroughly, especially between the toes.     2. Examine your feet daily looking for cuts, corns, blisters, cracks, ect, especially after wearing new shoes. Make sure to look between your toes. If you cannot see the bottom of your feet, set a mirror on the floor and hold your foot over it, or ask a spouse, friend or family member to examine your feet for you. Contact your doctor immediately if new problems are noted or if sores are not healing.     3. Immediately apply moisturizer to the tops and bottoms of your feet, avoiding areas between the toes. Hand lotion (Intesive Care, Simin, Eucerin, Neutrogena, Curel, ect) is sufficient unless your doctor " prescribes a medicated lotion. Apply sunscreen to your feet when going swimming outside.     4. Use clean comfortable shoes, wear white socks (if you have any bleeding or drainage, you will see it on white socks). Socks should not have thick seams or cut off the circulation around the leg. Break in new shoes slowly and rotate with older shoes until broken in. Check the inside of your shoes with your hand to look for areas of irritation or objects that may have fallen into your shoes.       5. Keep slippers by the side of your bed for use during the night.     6.  Shoes should be fitted by a professional and should not cause areas of irritation.  Check your feet regularly when wearing a new pair of shoes and replace them as needed.     7.  Talk to your doctor about proper exercise. Exercise and stretching stimulate blood flow to your feet and maintain proper glucose levels.     8.  Monitor your blood glucose level as instructed by your doctor. Notify your doctor immediately if your blood sugar is abnormally high or low.    9. Cut your nails straight across, but then gently round any sharp edges with a cardboard nail file. If you have neuropathy, peripheral vascular disease or cannot see that well to trim your own toenails contact [employer] ([phone]) or [employer] ([phone]).      THINGS TO AVOID DOING   1.  Do not soak your feet if you have an open sore. Use only lukewarm water and always check the temperature with your hand as hot water can easily burn your feet.       2.  Never use a hot water bottle or heating pad on your feet. Also do not apply cold compresses to your feet. With decreased sensation, you could burn or freeze your feet.       3.  Do not apply any of these to your feet:    -  Over the counter medicine for corns or warts    -  Harsh chemicals like boric acid    -  Do not self-treat corns, cuts, blisters or infections. Always consult your doctor.       4.  Do not wear sandals, slippers or  walk barefoot, especially on hot sand or concrete or other harsh surfaces.     5.  If you smoke, stop!!!      NEUROPATHY  Peripheral neuropathy is damage of the peripheral nerves. Your peripheral nerves--the nerves in your toes and fingertips--are the ones on the periphery of your body. When the nerves are damaged, they don't function properly. People with peripheral neuropathy have decreased or abnormal sensation in their toes and fingers. Sometimes, they develop problems moving these parts of the body as well.    When a person develops neuropathy, they usually begin to feel numbness or tingling in their feet and sometime in their legs.  Other symptoms may include painful burning or hot feet, tingling or feeling like insects or ants are crawling on your feet or legs.      In the [place], the most common cause of peripheral neuropathy is diabetes. According to the American Diabetes Association, 60 to 70 percent of people with diabetes will develop neuropathy within their lifetime.  Other causes of peripheral neuropathy include:  Certain medications, including some chemotherapy drugs.   Heredity. Some people have a family history of peripheral neuropathy.   Advanced age. Peripheral neuropathy is more common as people age.   Arthritis. Certain type of arthritis can cause peripheral neuropathy.   Alcoholism. According to the [place] National Library of Medicine, up to half of all long-term heavy alcohol users develop peripheral neuropathy.   Neurological disorders. Certain neurological disorders, including spina bifida and fibromyalgia, are associated with peripheral neuropathy.   Injury. Acute injury to the peripheral nerves may also cause peripheral neuropathy.     Pain Management Strategies:  1.Blood Sugar Control - Most important in diabetics  2. Medications such as: Amytriptylline, duloxetine, gabapentin, lyrica, tramadol  3. Nutritional therapy: Vitamin B6 (100mg daily), Vitamin B12 (75mcg daily), Vitamin D 2000  IU daily), Alpha-Lipoic Acid (600-1800mg daily), Acetyl-L-Carnitine (500-1000mg TID, L-methyl folate (1500mcg daily)  4. TENS (Trans-electrical nerve stimulation) with physical therapy  5. Compounding pain creams    RECOMMENDATION TO AVOID PROBLEMS  1.Wash your feet with lukewarm water and a mild soap and then dry them thoroughly, especially between the toes.  2. Examine your feet daily looking for cuts, corns, blisters, cracks, ect., especially after wearing new shoes. Make sure to look between your toes. If you cannot see the bottom of your feet, set a mirror on the floor and hold your foot over it, or ask a spouse, friend or family member to examine your feet for you. Contact your doctor immediately if new problems are noted or if sores are not healing.  3. Immediately apply moisturizer to the tops and bottoms of your feet, avoiding areas between the toes. Hand lotion (Intesive Care, Simin, Eucerin, Neutrogena, Curel, ect...) is sufficient unless your doctor prescribes a medicated lotion. Apply sunscreen to your feet when going swimming outside.  4. Use clean comfortable shoes, wear white socks (if you have any bleeding or drainage, you will see it on white socks). Socks should not have thick seams or cut off the circulation around the leg. Break in new shoes slowly and rotate with older shoes until broken in. Check the inside of your shoes with your hand to look for areas of irritation or objects that may have fallen into your shoes.    5. Keep slippers by the side of your bed for use during the night.  6. Shoes should be fitted by a professional and should not cause areas of irritation.  Check your feet regularly when wearing a new pair of shoes and replace them as needed.  7. Cut your nails straight across, but then gently round any sharp edges with a cardboard nail file.  If you have neuropathy, peripheral vascular disease or cannot see that well to trim your own toenails, see a medical professional for  care.  8. Taking Vitamin B6, B12, and Alpha Lipoic Acid supplements can sometimes help.    DO NOT  1.  Do not soak your feet if you have an open sore.  Use only lukewarm water and always check the temperature with your hand as hot water can easily burn your feet.    2.  Never use a hot water bottle or heating pad on your feet.  Also do not apply cold compresses to your feet.  With decreased sensation, you could burn or freeze your feet.    3. Do not apply any of these to your feet:   -  Over the counter medicine for corns or warts   -  Harsh chemicals like boric acid   -  Do not self-treat corns, cuts, blisters or infections. Always consult your doctor.  4.  Do not wear sandals, slippers or walk barefoot, especially on hot sand or concrete or other harsh surfaces.  5.  If you smoke, stop!

## 2025-05-03 ENCOUNTER — HOSPITAL ENCOUNTER (EMERGENCY)
Facility: CLINIC | Age: 75
Discharge: HOME | End: 2025-05-03
Attending: EMERGENCY MEDICINE | Admitting: EMERGENCY MEDICINE
Payer: COMMERCIAL

## 2025-05-03 VITALS
HEART RATE: 115 BPM | TEMPERATURE: 98.6 F | SYSTOLIC BLOOD PRESSURE: 156 MMHG | HEIGHT: 66 IN | OXYGEN SATURATION: 98 % | RESPIRATION RATE: 16 BRPM | BODY MASS INDEX: 35.36 KG/M2 | WEIGHT: 220 LBS | DIASTOLIC BLOOD PRESSURE: 79 MMHG

## 2025-05-03 DIAGNOSIS — S60.512A ABRASION OF LEFT HAND, INITIAL ENCOUNTER: ICD-10-CM

## 2025-05-03 PROCEDURE — 99282 EMERGENCY DEPT VISIT SF MDM: CPT

## 2025-05-03 ASSESSMENT — ACTIVITIES OF DAILY LIVING (ADL): ADLS_ACUITY_SCORE: 41

## 2025-05-03 ASSESSMENT — COLUMBIA-SUICIDE SEVERITY RATING SCALE - C-SSRS
2. HAVE YOU ACTUALLY HAD ANY THOUGHTS OF KILLING YOURSELF IN THE PAST MONTH?: NO
1. IN THE PAST MONTH, HAVE YOU WISHED YOU WERE DEAD OR WISHED YOU COULD GO TO SLEEP AND NOT WAKE UP?: NO
6. HAVE YOU EVER DONE ANYTHING, STARTED TO DO ANYTHING, OR PREPARED TO DO ANYTHING TO END YOUR LIFE?: NO

## 2025-05-03 NOTE — DISCHARGE INSTRUCTIONS
Discharge Instructions  Head Injury    You have been seen today for a head injury. Your evaluation included a history and physical examination. You may have had a CT (CAT) scan performed, though most head injuries do not require a scan. Based on this evaluation, your provider today does not feel that your head injury is serious.    Generally, every Emergency Department visit should have a follow-up clinic visit with either a primary or a specialty clinic/provider. Please follow-up as instructed by your emergency provider today.  Return to the Emergency Department if:  You are confused or you are not acting right.  Your headache gets worse or you start to have a really bad headache even with your recommended treatment plan.  You vomit (throw up) more than once.  You have a seizure.  You have trouble walking.  You have weakness or paralysis (cannot move) in an arm or a leg.  You have blood or fluid coming from your ears or nose.  You have new symptoms or anything that worries you.    Sleeping:  It is okay for you to sleep, but someone should wake you up if instructed by your provider, and someone should check on you at your usual time to wake up.     Activity:  Do not drive for at least 24 hours.  Do not drive if you have dizzy spells or trouble concentrating, or remembering things.  Do not return to any contact sports until cleared by your regular provider.     MORE INFORMATION:    Concussion:  A concussion is a minor head injury that may cause temporary problems with the way the brain works. Although concussions are important, they are generally not an emergency or a reason that a person needs to be hospitalized. Some concussion symptoms include confusion, amnesia (forgetful), nausea (sick to your stomach) and vomiting (throwing up), dizziness, fatigue, memory or concentration problems, irritability and sleep problems. For most people, concussions are mild and temporary but some will have more severe and persistent  symptoms that require on-going care and treatment.  CT Scans: Your evaluation today may have included a CT scan (CAT scan) to look for things like bleeding or a skull fracture (broken bone).  CT scans involve radiation and too many CT scans can cause serious health problems like cancer, especially in children.  Because of this, your provider may not have ordered a CT scan today if they think you are at low risk for a serious or life threatening problem.    Blood Thinners. If you take blood thinners, there is a very small risk of delayed bleeding after your head injury. In the days/weeks to come, watch for the symptoms described above particularly headaches, confusion, problems walking, and weakness in an arm or leg.    If you were given a prescription for medicine here today, be sure to read all of the information (including the package insert) that comes with your prescription.  This will include important information about the medicine, its side effects, and any warnings that you need to know about.  The pharmacist who fills the prescription can provide more information and answer questions you may have about the medicine.  If you have questions or concerns that the pharmacist cannot address, please call or return to the Emergency Department.     Remember that you can always come back to the Emergency Department if you are not able to see your regular provider in the amount of time listed above, if you get any new symptoms, or if there is anything that worries you.      You can remove the Ace wrap tomorrow.  Leave the dressing in place for the next 48 hours after that.  You can return to work.  Then you can remove the dressing and the wound should be scabbed underneath it.    I gave you a head injury sheet because you are on Xarelto.  If you develop any significant headache, vision changes, nausea or vomiting, or neurological symptoms, return to the emergency department for a CAT scan of the brain
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
## 2025-07-22 ENCOUNTER — OFFICE VISIT (OUTPATIENT)
Dept: PHARMACY | Facility: CLINIC | Age: 75
End: 2025-07-22
Attending: INTERNAL MEDICINE
Payer: COMMERCIAL

## 2025-07-22 ENCOUNTER — ALLIED HEALTH/NURSE VISIT (OUTPATIENT)
Dept: FAMILY MEDICINE | Facility: CLINIC | Age: 75
End: 2025-07-22
Payer: COMMERCIAL

## 2025-07-22 ENCOUNTER — LAB (OUTPATIENT)
Dept: LAB | Facility: CLINIC | Age: 75
End: 2025-07-22
Payer: COMMERCIAL

## 2025-07-22 VITALS — SYSTOLIC BLOOD PRESSURE: 116 MMHG | DIASTOLIC BLOOD PRESSURE: 60 MMHG | WEIGHT: 207 LBS | BODY MASS INDEX: 33.41 KG/M2

## 2025-07-22 DIAGNOSIS — E11.21 TYPE 2 DIABETES MELLITUS WITH DIABETIC NEPHROPATHY, WITH LONG-TERM CURRENT USE OF INSULIN (H): Primary | ICD-10-CM

## 2025-07-22 DIAGNOSIS — Z23 HIGH PRIORITY FOR 2019-NCOV VACCINE: Primary | ICD-10-CM

## 2025-07-22 DIAGNOSIS — E78.5 HYPERLIPIDEMIA LDL GOAL <100: ICD-10-CM

## 2025-07-22 DIAGNOSIS — I10 PRIMARY HYPERTENSION: ICD-10-CM

## 2025-07-22 DIAGNOSIS — R21 RASH: ICD-10-CM

## 2025-07-22 DIAGNOSIS — Z79.4 TYPE 2 DIABETES MELLITUS WITH DIABETIC NEPHROPATHY, WITH LONG-TERM CURRENT USE OF INSULIN (H): Primary | ICD-10-CM

## 2025-07-22 DIAGNOSIS — I48.91 ATRIAL FIBRILLATION, UNSPECIFIED TYPE (H): ICD-10-CM

## 2025-07-22 DIAGNOSIS — Z79.4 TYPE 2 DIABETES MELLITUS WITH DIABETIC NEPHROPATHY, WITH LONG-TERM CURRENT USE OF INSULIN (H): ICD-10-CM

## 2025-07-22 DIAGNOSIS — E11.21 TYPE 2 DIABETES MELLITUS WITH DIABETIC NEPHROPATHY, WITH LONG-TERM CURRENT USE OF INSULIN (H): ICD-10-CM

## 2025-07-22 DIAGNOSIS — Z23 NEED FOR VACCINATION: ICD-10-CM

## 2025-07-22 DIAGNOSIS — R52 PAIN: ICD-10-CM

## 2025-07-22 DIAGNOSIS — Z78.9 TAKES DIETARY SUPPLEMENTS: ICD-10-CM

## 2025-07-22 LAB
EST. AVERAGE GLUCOSE BLD GHB EST-MCNC: 151 MG/DL
HBA1C MFR BLD: 6.9 % (ref 0–5.6)

## 2025-07-22 PROCEDURE — 99207 PR NO CHARGE NURSE ONLY: CPT

## 2025-07-22 PROCEDURE — 3074F SYST BP LT 130 MM HG: CPT | Performed by: PHARMACIST

## 2025-07-22 PROCEDURE — 99605 MTMS BY PHARM NP 15 MIN: CPT | Performed by: PHARMACIST

## 2025-07-22 PROCEDURE — 3078F DIAST BP <80 MM HG: CPT | Performed by: PHARMACIST

## 2025-07-22 PROCEDURE — 90480 ADMN SARSCOV2 VAC 1/ONLY CMP: CPT

## 2025-07-22 PROCEDURE — 3044F HG A1C LEVEL LT 7.0%: CPT | Performed by: PHARMACIST

## 2025-07-22 PROCEDURE — 36415 COLL VENOUS BLD VENIPUNCTURE: CPT

## 2025-07-22 PROCEDURE — 99607 MTMS BY PHARM ADDL 15 MIN: CPT | Performed by: PHARMACIST

## 2025-07-22 PROCEDURE — 91320 SARSCV2 VAC 30MCG TRS-SUC IM: CPT

## 2025-07-22 PROCEDURE — 83036 HEMOGLOBIN GLYCOSYLATED A1C: CPT

## 2025-07-22 NOTE — PATIENT INSTRUCTIONS
"Recommendations from today's MTM visit:                                                    MTM (medication therapy management) is a service provided by a clinical pharmacist designed to help you get the most of out of your medicines.   Today we reviewed what your medicines are for, how to know if they are working, that your medicines are safe and how to make your medicine regimen as easy as possible.      Labs today - A1c  COVID-19 booster today    Follow-up: Return in about 6 months (around 1/22/2026) for Follow-up Medication Review.    It was great speaking with you today.  I value your experience and would be very thankful for your time in providing feedback in our clinic survey. In the next few days, you may receive an email or text message from [employer] with a link to a survey related to your  clinical pharmacist.\"     To schedule another MTM appointment, please call the clinic directly or you may call the MTM scheduling line at [phone] or toll-free at [phone].     My Clinical Pharmacist's contact information:                                                      Please feel free to contact me with any questions or concerns you have.      Tosin Gillette, PharmD, [facility]  Medication Therapy Management Provider  [phone]    "

## 2025-07-22 NOTE — LETTER
"Recommended To-Do List      Prepared on: Jul 22, 2025       You can get the best results from your medications by completing the items on this \"To-Do List.\"      Bring your To-Do List when you go to your doctor. And, share it with your family or caregivers.    My To-Do List:  What we talked about: What I should do:   A new medication that may be of benefit to you    Get the following vaccine(s): COVID-19, given today           What we talked about: What I should do:   Needing additional monitoring    Get the following lab test(s): A1c, drawn today           What we talked about: What I should do:                     "

## 2025-07-22 NOTE — LETTER
_  Medication List        Prepared on: Jul 22, 2025     Bring your Medication List when you go to the doctor, hospital, or   emergency room. And, share it with your family or caregivers.     Note any changes to how you take your medications.  Cross out medications when you no longer use them.    Medication How I take it Why I use it Prescriber   ammonium lactate (AMLACTIN) 12 % external cream APPLY TOPICALLY 2 TIMES DAILY AS NEEDED FOR DRY SKIN Dry Skin Maurice Rizo MD   Continuous Blood Gluc Sensor (DEXCOM G6 SENSOR) MISC Change every 10 days. Type 2 diabetes Patient Reported   Continuous Blood Gluc Transmit (DEXCOM G6 TRANSMITTER) MISC Use as directed Type 2 diabetes Patient Reported   Cyanocobalamin (VITAMIN B-12 SL) Take 2 tablets by mouth daily Dose unknown General Health  Self   dapagliflozin (FARXIGA) 10 MG TABS tablet Take 10 mg by mouth daily Type 2 diabetes Patient Reported   glimepiride (AMARYL) 4 MG tablet Take 8 mg by mouth daily Type 2 diabetes Patient Reported   hydrochlorothiazide (MICROZIDE) 12.5 MG capsule TAKE TWO CAPSULES BY MOUTH DAILY Essential Hypertension Gonzalez Akhtar MD   insulin lispro (HUMALOG KWIKPEN) 100 UNIT/ML (1 unit dial) KWIKPEN Inject Subcutaneous 3 times daily (before meals) According to sliding scale Type 2 diabetes Patient Reported   lisinopril (ZESTRIL) 20 MG tablet Take 1 tablet (20 mg) by mouth 2 times daily Essential hypertension with goal blood pressure less than 140/90 Gonzalez Akhtar MD   metFORMIN (GLUCOPHAGE) 1000 MG tablet Take 1 tablet (1,000 mg) by mouth 2 times daily (take with meals) Type 2 diabetes mellitus without complication, with long-term current use of insulin (H) Gonzalez Akhtar MD   metoprolol succinate ER (TOPROL XL) 50 MG 24 hr tablet TAKE 1 TABLET BY MOUTH ONCE DAILY Persistent Atrial Fibrillation (H) Gonzalez Akhtar MD   OZEMPIC, 2 MG/DOSE, 8 MG/3ML pen Inject 2 mg subcutaneously every 7 days. Type 2 diabetes Patient Reported    rivaroxaban ANTICOAGULANT (XARELTO ANTICOAGULANT) 20 MG TABS tablet TAKE ONE TABLET BY MOUTH ONE TIME DAILY WITH DINNER Atrial fibrillation, unspecified type (H) Pablo Enrique MD   rosuvastatin (CRESTOR) 20 MG tablet Take 1 tablet (20 mg) by mouth daily. Coronary artery calcification Pablo Enrique MD   triamcinolone (KENALOG) 0.1 % external cream Apply topically 2 times daily. To the leg area Venous stasis dermatitis of both lower extremities Pablo Enrique MD   ULTICARE 29G X 12.7MM insulin pen needle USE ONCE DAILY Type 2 diabetes Patient Reported   UNABLE TO FIND Take 2 capsules by mouth daily MEDICATION NAME: Lymphatic Support 1000 General Health  Self   VITAMIN D (CHOLECALCIFEROL) PO Take 1 tablet by mouth daily Dose unknown General Health  Self         Add new medications, over-the-counter drugs, herbals, vitamins, or  minerals in the blank rows below.    Medication How I take it Why I use it Prescriber                                      Allergies:      - Simvastatin - Other (See Comments)        Side effects I have had:      Not on File        Other Information:              My notes and questions:

## 2025-07-22 NOTE — PROGRESS NOTES
Medication Therapy Management (MTM) Encounter    ASSESSMENT:                            Medication Adherence/Access: No issues identified.    Diabetes   Patient is not meeting A1c goal of < 7%, due for re-check.  Self monitoring of blood glucose is at goal of > 70% time in target with continuous glucose monitoring per his report.    Hypertension/A. Fib  Stable. Patient is meeting blood pressure goal of < 130/80mmHg.     Hyperlipidemia   Stable. LDL is at goal <70mg/dL per ADA guidelines.       Pain  Stable.     Rash  Stable.    Supplements  Stable.     Immunizations  Due for COVID-19 booster.    PLAN:                            Labs today - A1c  COVID-19 booster given today    Follow-up: Return in about 6 months (around 1/22/2026) for Follow-up Medication Review.    SUBJECTIVE/OBJECTIVE:                          Te Yoder is a 74 year old male seen for a follow-up visit.       Reason for visit: Routine follow-up.    Tobacco: He reports that he has never smoked. He has never been exposed to tobacco smoke. He has never used smokeless tobacco.  Alcohol: 1-3 beverages / week    Medication Adherence/Access: no issues reported.    Diabetes   Farxiga 10mg daily  Glimepiride 8mg daily  Humalog three times daily based on sliding scale - generally taking 10-15 units as needed based on elevated blood sugar (uses this to correct blood sugar vs proactively although encouraged to take prior to meals in the past), finding he's needing to use this less now  Metformin 1000mg twice daily   Ozempic 2mg weekly  Not taking aspirin due to Xarelto use  Continues following closely with endocrinology (Willy Taylor NP)  Patient is not experiencing side effects.  Current diabetes symptoms: Occasionally has hypoglycemia from over-correction of hyperglycemia  Diet/Exercise: No changes   Blood sugar monitoring: Continuous Glucose Monitor - he reports generally in target ranges  Eye exam is up to date  Foot exam is up to date      Hypertension /A.  Fib  Xarelto 20mg daily  Hydrochlorothiazide 25mg daily  Lisinopril 20mg twice daily   Metoprolol succinate 50mg daily  Patient reports no current medication side effects  Patient does not self-monitor blood pressure.       Hyperlipidemia   Rosuvastatin 20mg daily  Patient reports no significant myalgias or other side effects     Pain  Acetaminophen 650-1300mg as needed - occasionally takes when working in the summer  He finds this effective and denies side effects     Rash  Amlactin cream twice daily as needed   Triamcinolone 0.1% cream as needed   He reports this is effective, no side effects reported.    Supplements   Lymphatic Support 1000 2 capsules - uses occasionally recommended by Dr. Enrique  Vitamin B12 daily - dose unknown  Vitamin D daily - dose unknown  No reported issues at this time.       Immunizations  Most Recent Immunizations   Administered Date(s) Administered    COVID-19 12+ (MODERNA) 12/02/2024    COVID-19 12+ (Pfizer) 07/22/2025    COVID-19 Bivalent 18+ (Moderna) 05/08/2023    COVID-19 Monovalent 18+ (Moderna) 04/04/2022    Influenza (H1N1) 12/02/2009    Influenza (High Dose) Trivalent,PF (Fluzone) 12/02/2024    Influenza (IIV3) PF 09/20/2013    Influenza (prior to 2024) 11/16/2011    Influenza Vaccine 65+ (Fluzone HD) 09/11/2023    Influenza Vaccine >6 months,quad, PF 10/27/2014    Mantoux Tuberculin Skin Test 08/10/2016    Pneumo Conj 13-V (2010&after) 01/29/2016    Pneumococcal 23 valent 03/06/2017    RSV Vaccine (Arexvy) 11/16/2023    TDAP (Adacel,Boostrix) 11/08/2022    TDAP Vaccine (Adacel) 07/16/2012    Zoster recombinant adjuvanted (Shingrix) 07/05/2018    Zoster vaccine, live 09/06/2011      Today's Vitals: /60   Wt 207 lb (93.9 kg)   BMI 33.41 kg/m    ----------------    I spent 30 minutes with this patient today. All changes were made via collaborative practice agreement with Dr. Akhtar    A summary of these recommendations was given to the patient.    Tosin Gillette  PharmD, BCACP  Medication Therapy Management Provider  [phone]      Medication Therapy Recommendations  Need for vaccination   1 Rationale: Preventive therapy - Needs additional medication therapy - Indication   Recommendation: Order Vaccine - COVID-19 MRNA VACC (MODERNA) IM   Status: Accepted per CPA   Identified Date: 7/22/2025 Completed Date: 7/22/2025         Type 2 diabetes mellitus with diabetic nephropathy, with long-term current use of insulin (H)   1 Current Medication: insulin lispro (HUMALOG KWIKPEN) 100 UNIT/ML (1 unit dial) KWIKPEN   Current Medication Sig: Inject Subcutaneous 3 times daily (before meals) According to sliding scale   Rationale: Medication requires monitoring - Needs additional monitoring - Effectiveness   Recommendation: Order Lab   Status: Accepted per CPA   Identified Date: 7/22/2025 Completed Date: 7/22/2025

## 2025-07-22 NOTE — LETTER
July 23, 2025  Te Yoder  [address] [place] [id number]    Dear Mr. Yoder, [facility]     Thank you for talking with me on Jul 22, 2025 about your health and medications. As a follow-up to our conversation, I have included two documents:      Your Recommended To-Do List has steps you should take to get the best results from your medications.  Your Medication List will help you keep track of your medications and how to take them.    If you want to talk about these documents, please call Tosin Gillette PharmD at phone: [phone], Monday-Friday 8-4:30pm.    I look forward to working with you and your doctors to make sure your medications work well for you.    Sincerely,  Tosin Gillette PharmD  [facility] Pharmacist, [facility]

## 2025-08-09 DIAGNOSIS — I48.19 PERSISTENT ATRIAL FIBRILLATION (H): ICD-10-CM

## 2025-08-11 RX ORDER — METOPROLOL SUCCINATE 50 MG/1
50 TABLET, EXTENDED RELEASE ORAL DAILY
Qty: 90 TABLET | Refills: 0 | Status: SHIPPED | OUTPATIENT
Start: 2025-08-11